# Patient Record
Sex: FEMALE | Race: WHITE | NOT HISPANIC OR LATINO | Employment: FULL TIME | URBAN - METROPOLITAN AREA
[De-identification: names, ages, dates, MRNs, and addresses within clinical notes are randomized per-mention and may not be internally consistent; named-entity substitution may affect disease eponyms.]

---

## 2017-04-15 ENCOUNTER — OFFICE VISIT (OUTPATIENT)
Dept: URGENT CARE | Facility: CLINIC | Age: 37
End: 2017-04-15
Payer: COMMERCIAL

## 2017-04-15 PROCEDURE — 99203 OFFICE O/P NEW LOW 30 MIN: CPT

## 2017-04-15 PROCEDURE — 87430 STREP A AG IA: CPT

## 2018-04-03 PROBLEM — J02.9 SORE THROAT: Status: ACTIVE | Noted: 2017-04-15

## 2018-04-03 PROBLEM — I10 HYPERTENSION: Status: ACTIVE | Noted: 2017-04-15

## 2018-04-03 PROBLEM — E07.9 THYROID DISEASE: Status: ACTIVE | Noted: 2017-04-15

## 2018-04-03 RX ORDER — LEVOTHYROXINE SODIUM 0.05 MG/1
50 TABLET ORAL DAILY
Refills: 5 | COMMUNITY
Start: 2018-02-14 | End: 2018-04-18 | Stop reason: SDUPTHER

## 2018-04-03 RX ORDER — CHOLECALCIFEROL (VITAMIN D3) 50 MCG
2000 TABLET ORAL DAILY
COMMUNITY
End: 2018-04-18 | Stop reason: SDUPTHER

## 2018-04-03 RX ORDER — METOPROLOL SUCCINATE 25 MG/1
25 TABLET, EXTENDED RELEASE ORAL 2 TIMES DAILY
Refills: 0 | COMMUNITY
Start: 2018-02-23 | End: 2018-04-04 | Stop reason: SDUPTHER

## 2018-04-03 RX ORDER — METOPROLOL SUCCINATE 200 MG/1
TABLET, EXTENDED RELEASE ORAL
COMMUNITY
End: 2018-04-04 | Stop reason: ALTCHOICE

## 2018-04-03 NOTE — PROGRESS NOTES
Assessment/Plan:    No problem-specific Assessment & Plan notes found for this encounter  Diagnoses and all orders for this visit:    Other fatigue  Comments:  labwork ordered  Orders:  -     UA w Reflex to Microscopic w Reflex to Culture    Encounter for preventative adult health care examination  Comments:  Labwork ordered      Unexplained weight gain  Comments:  Labwork ordered      Vitamin D deficiency  Comments:  Labwork ordered  Orders:  -     Vitamin D 25 hydroxy; Future    Thyroid disease  Comments:  Pt currently taking Levothyroxine  Labwork ordered  Orders:  -     TSH, 3rd generation with T4 reflex; Future    Essential hypertension  Comments:  Pt currently managed on Toprol XL 50 mg daily bp today 130/86    Orders:  -     Comprehensive metabolic panel    Screening for deficiency anemia  Comments:  labwork ordered  Orders:  -     CBC and differential; Future    Screening for hyperlipidemia  Comments:  labwork ordered  Orders:  -     Lipid panel; Future    Screening for diabetes mellitus  Comments:  labwork ordered  Orders:  -     Comprehensive metabolic panel    Other orders  -     levothyroxine 50 mcg tablet; Take 50 mcg by mouth daily  -     Discontinue: metoprolol succinate (TOPROL-XL) 25 mg 24 hr tablet; Take 25 mg by mouth 2 (two) times a day    -     Discontinue: metoprolol succinate (TOPROL-XL) 200 MG 24 hr tablet; Take by mouth  -     Multiple Vitamins-Minerals (MULTIVITAL PO); Take 1 capsule by mouth daily    -     Cholecalciferol (VITAMIN D) 2000 units tablet; Take 2,000 Units by mouth daily    -     metoprolol succinate (TOPROL-XL) 50 mg 24 hr tablet; Take 50 mg by mouth daily          Subjective:      Patient ID: Sara Conley is a 44 y o  female here to [facility], pt was former pt of Dr Lizzy Cardozo  Pt c/o inability to lose weight with weight gain of >50lbs  Pt has hx of total hysterectomy 5 years ago      HPI    The following portions of the patient's history were reviewed and updated as appropriate:   She  has a past medical history of Disease of thyroid gland and Hypertension  She   Patient Active Problem List    Diagnosis Date Noted    Essential hypertension 04/15/2017    Sore throat 04/15/2017    Thyroid disease 04/15/2017     She  has a past surgical history that includes  section and Hysterectomy  Her family history includes Asthma in her father; Hypertension in her father and mother; Muscular dystrophy in her mother; Raynaud syndrome in her sister  She  reports that she quit smoking about 15 years ago  Her smoking use included Cigarettes  She has never used smokeless tobacco  She reports that she drinks about 0 6 - 1 2 oz of alcohol per week   She reports that she does not use drugs  Current Outpatient Prescriptions   Medication Sig Dispense Refill    Cholecalciferol (VITAMIN D) 2000 units tablet Take 2,000 Units by mouth daily        levothyroxine 50 mcg tablet Take 50 mcg by mouth daily  5    metoprolol succinate (TOPROL-XL) 50 mg 24 hr tablet Take 50 mg by mouth daily      Multiple Vitamins-Minerals (MULTIVITAL PO) Take 1 capsule by mouth daily         No current facility-administered medications for this visit  No current outpatient prescriptions on file prior to visit  No current facility-administered medications on file prior to visit  She is allergic to nuts; amoxicillin; penicillins; and sulfamethoxazole-trimethoprim       Review of Systems   Constitutional: Negative  Negative for fatigue  HENT: Negative  Negative for congestion, postnasal drip, rhinorrhea, sinus pain, sore throat and trouble swallowing  Eyes: Negative  Negative for pain and visual disturbance  Respiratory: Negative  Negative for cough, shortness of breath and wheezing  Cardiovascular: Negative  Negative for chest pain and palpitations  Gastrointestinal: Negative  Negative for constipation, diarrhea, nausea and vomiting  Endocrine: Negative    Negative for polydipsia, polyphagia and polyuria  Genitourinary: Negative  Negative for difficulty urinating, flank pain, frequency and pelvic pain  Musculoskeletal: Negative  Negative for arthralgias, back pain, joint swelling and myalgias  Skin: Negative  Negative for color change and rash  Allergic/Immunologic: Negative  Neurological: Negative  Negative for dizziness, syncope, weakness, light-headedness, numbness and headaches  Hematological: Negative  Negative for adenopathy  Does not bruise/bleed easily  Psychiatric/Behavioral: Negative  Negative for behavioral problems and sleep disturbance  The patient is not nervous/anxious  Objective:      /86 (BP Location: Left arm, Patient Position: Sitting, Cuff Size: Standard)   Pulse 60   Temp 99 °F (37 2 °C) (Tympanic)   Resp 16   Ht 5' 4" (1 626 m)   Wt 80 3 kg (177 lb)   BMI 30 38 kg/m²          Physical Exam   Constitutional: She is oriented to person, place, and time  She appears well-developed and well-nourished  HENT:   Head: Normocephalic  Eyes: Pupils are equal, round, and reactive to light  Neck: Normal range of motion  Cardiovascular: Normal rate and regular rhythm  Pulmonary/Chest: Effort normal and breath sounds normal    Abdominal: Soft  Bowel sounds are normal    Musculoskeletal: Normal range of motion  Neurological: She is alert and oriented to person, place, and time  Skin: Skin is warm and dry  Psychiatric: She has a normal mood and affect  Her behavior is normal  Judgment and thought content normal    Nursing note and vitals reviewed

## 2018-04-04 ENCOUNTER — OFFICE VISIT (OUTPATIENT)
Dept: FAMILY MEDICINE CLINIC | Facility: CLINIC | Age: 38
End: 2018-04-04
Payer: COMMERCIAL

## 2018-04-04 VITALS
BODY MASS INDEX: 30.22 KG/M2 | TEMPERATURE: 99 F | HEIGHT: 64 IN | RESPIRATION RATE: 16 BRPM | SYSTOLIC BLOOD PRESSURE: 130 MMHG | DIASTOLIC BLOOD PRESSURE: 86 MMHG | WEIGHT: 177 LBS | HEART RATE: 60 BPM

## 2018-04-04 DIAGNOSIS — Z13.0 SCREENING FOR DEFICIENCY ANEMIA: ICD-10-CM

## 2018-04-04 DIAGNOSIS — E55.9 VITAMIN D DEFICIENCY: ICD-10-CM

## 2018-04-04 DIAGNOSIS — Z13.220 SCREENING FOR HYPERLIPIDEMIA: ICD-10-CM

## 2018-04-04 DIAGNOSIS — R53.83 OTHER FATIGUE: Primary | ICD-10-CM

## 2018-04-04 DIAGNOSIS — R63.5 UNEXPLAINED WEIGHT GAIN: ICD-10-CM

## 2018-04-04 DIAGNOSIS — Z13.1 SCREENING FOR DIABETES MELLITUS: ICD-10-CM

## 2018-04-04 DIAGNOSIS — E07.9 THYROID DISEASE: ICD-10-CM

## 2018-04-04 DIAGNOSIS — I10 ESSENTIAL HYPERTENSION: ICD-10-CM

## 2018-04-04 DIAGNOSIS — Z00.00 ENCOUNTER FOR PREVENTATIVE ADULT HEALTH CARE EXAMINATION: ICD-10-CM

## 2018-04-04 PROBLEM — N23 RENAL COLIC ON RIGHT SIDE: Status: ACTIVE | Noted: 2018-04-04

## 2018-04-04 PROBLEM — G43.109 OCULAR MIGRAINE: Status: ACTIVE | Noted: 2018-04-04

## 2018-04-04 PROCEDURE — 99204 OFFICE O/P NEW MOD 45 MIN: CPT | Performed by: NURSE PRACTITIONER

## 2018-04-04 RX ORDER — METOPROLOL SUCCINATE 50 MG/1
50 TABLET, EXTENDED RELEASE ORAL DAILY
COMMUNITY
End: 2018-04-18 | Stop reason: SDUPTHER

## 2018-04-04 NOTE — PATIENT INSTRUCTIONS
Hypothyroidism   AMBULATORY CARE:   Hypothyroidism  is a condition that develops when the thyroid gland does not make enough thyroid hormone  Thyroid hormones help control body temperature, heart rate, growth, and weight  Common signs and symptoms include the following: The signs and symptoms may develop slowly, sometimes over several years  · Exhaustion    · Sensitivity to cold    · Headaches or decreased concentration    · Muscle aches or weakness    · Constipation     · Dry, flaky skin or brittle nails    · Thinning hair    · Heavy or irregular monthly periods    · Depression or irritability  Call 911 for any of the following:   · You have sudden chest pain or shortness of breath  · You have a seizure  · You feel like you are going to faint  Seek care immediately if:   · You have diarrhea, tremors, or trouble sleeping  · Your legs, ankles, or feet are swollen  Contact your healthcare provider if:   · You have a fever  · You have chills, a cough, or feel weak and achy  · You have pain and swelling in your muscles and joints  · Your skin is itchy, swollen, or you have a rash  · Your signs and symptoms return or get worse, even after treatment  · You have questions or concerns about your condition or care  Treatment:  Thyroid hormone replacement medicine may bring your thyroid hormone level back to normal  Ask your healthcare provider for more information on other medicines you may need  Follow up with your healthcare provider as directed: You may need to return for more blood tests to check your thyroid hormone level  This will show if you are getting the right amount of thyroid medicine  Write down your questions so you remember to ask them during your visits  © 2017 [employer]  Information is for End User's use only and may not be sold, redistributed or otherwise used for commercial purposes   All illustrations and images included in CareNotes® are the copyrighted property of [employer]  or [employer]en Rodríguez  The above information is an  only  It is not intended as medical advice for individual conditions or treatments  Talk to your doctor, nurse or pharmacist before following any medical regimen to see if it is safe and effective for you

## 2018-04-09 ENCOUNTER — APPOINTMENT (OUTPATIENT)
Dept: LAB | Facility: CLINIC | Age: 38
End: 2018-04-09
Payer: COMMERCIAL

## 2018-04-09 ENCOUNTER — TRANSCRIBE ORDERS (OUTPATIENT)
Dept: LAB | Facility: CLINIC | Age: 38
End: 2018-04-09

## 2018-04-09 DIAGNOSIS — E55.9 VITAMIN D DEFICIENCY: ICD-10-CM

## 2018-04-09 DIAGNOSIS — E07.9 THYROID DISEASE: ICD-10-CM

## 2018-04-09 DIAGNOSIS — Z13.220 SCREENING FOR HYPERLIPIDEMIA: ICD-10-CM

## 2018-04-09 DIAGNOSIS — Z13.0 SCREENING FOR DEFICIENCY ANEMIA: ICD-10-CM

## 2018-04-09 LAB
25(OH)D3 SERPL-MCNC: 49.4 NG/ML (ref 30–100)
ALBUMIN SERPL BCP-MCNC: 3.8 G/DL (ref 3.5–5)
ALP SERPL-CCNC: 70 U/L (ref 46–116)
ALT SERPL W P-5'-P-CCNC: 25 U/L (ref 12–78)
ANION GAP SERPL CALCULATED.3IONS-SCNC: 5 MMOL/L (ref 4–13)
AST SERPL W P-5'-P-CCNC: 22 U/L (ref 5–45)
BACTERIA UR QL AUTO: ABNORMAL /HPF
BASOPHILS # BLD AUTO: 0.02 THOUSANDS/ΜL (ref 0–0.1)
BASOPHILS NFR BLD AUTO: 0 % (ref 0–1)
BILIRUB SERPL-MCNC: 0.24 MG/DL (ref 0.2–1)
BILIRUB UR QL STRIP: NEGATIVE
BUN SERPL-MCNC: 18 MG/DL (ref 5–25)
CALCIUM SERPL-MCNC: 9 MG/DL
CHLORIDE SERPL-SCNC: 109 MMOL/L (ref 100–108)
CHOLEST SERPL-MCNC: 185 MG/DL (ref 50–200)
CLARITY UR: CLEAR
CO2 SERPL-SCNC: 25 MMOL/L (ref 21–32)
COLOR UR: YELLOW
CREAT SERPL-MCNC: 0.7 MG/DL (ref 0.6–1.3)
EOSINOPHIL # BLD AUTO: 0.17 THOUSAND/ΜL (ref 0–0.61)
EOSINOPHIL NFR BLD AUTO: 3 % (ref 0–6)
ERYTHROCYTE [DISTWIDTH] IN BLOOD BY AUTOMATED COUNT: 13 % (ref 11.6–15.1)
GFR SERPL CREATININE-BSD FRML MDRD: 110 ML/MIN/1.73SQ M
GLUCOSE P FAST SERPL-MCNC: 83 MG/DL (ref 65–99)
GLUCOSE UR STRIP-MCNC: NEGATIVE MG/DL
HCT VFR BLD AUTO: 40.1 % (ref 34.8–46.1)
HDLC SERPL-MCNC: 43 MG/DL (ref 40–60)
HGB BLD-MCNC: 13.5 G/DL (ref 11.5–15.4)
HGB UR QL STRIP.AUTO: NEGATIVE
KETONES UR STRIP-MCNC: NEGATIVE MG/DL
LDLC SERPL CALC-MCNC: 114 MG/DL (ref 0–100)
LEUKOCYTE ESTERASE UR QL STRIP: ABNORMAL
LYMPHOCYTES # BLD AUTO: 2.17 THOUSANDS/ΜL (ref 0.6–4.47)
LYMPHOCYTES NFR BLD AUTO: 32 % (ref 14–44)
MCH RBC QN AUTO: 30.8 PG (ref 26.8–34.3)
MCHC RBC AUTO-ENTMCNC: 33.7 G/DL (ref 31.4–37.4)
MCV RBC AUTO: 92 FL (ref 82–98)
MONOCYTES # BLD AUTO: 0.69 THOUSAND/ΜL (ref 0.17–1.22)
MONOCYTES NFR BLD AUTO: 10 % (ref 4–12)
NEUTROPHILS # BLD AUTO: 3.66 THOUSANDS/ΜL (ref 1.85–7.62)
NEUTS SEG NFR BLD AUTO: 55 % (ref 43–75)
NITRITE UR QL STRIP: NEGATIVE
NON-SQ EPI CELLS URNS QL MICRO: ABNORMAL /HPF
NONHDLC SERPL-MCNC: 142 MG/DL
NRBC BLD AUTO-RTO: 0 /100 WBCS
PH UR STRIP.AUTO: 5.5 [PH] (ref 4.5–8)
PLATELET # BLD AUTO: 208 THOUSANDS/UL (ref 149–390)
PMV BLD AUTO: 12.7 FL (ref 8.9–12.7)
POTASSIUM SERPL-SCNC: 4.3 MMOL/L (ref 3.5–5.3)
PROT SERPL-MCNC: 7.8 G/DL (ref 6.4–8.2)
PROT UR STRIP-MCNC: NEGATIVE MG/DL
RBC # BLD AUTO: 4.38 MILLION/UL (ref 3.81–5.12)
RBC #/AREA URNS AUTO: ABNORMAL /HPF
SODIUM SERPL-SCNC: 139 MMOL/L (ref 136–145)
SP GR UR STRIP.AUTO: 1.02 (ref 1–1.03)
T4 FREE SERPL-MCNC: 0.97 NG/DL (ref 0.76–1.46)
TRIGL SERPL-MCNC: 139 MG/DL
TSH SERPL DL<=0.05 MIU/L-ACNC: 4.34 UIU/ML (ref 0.36–3.74)
UROBILINOGEN UR QL STRIP.AUTO: 0.2 E.U./DL
WBC # BLD AUTO: 6.73 THOUSAND/UL (ref 4.31–10.16)
WBC #/AREA URNS AUTO: ABNORMAL /HPF

## 2018-04-09 PROCEDURE — 84443 ASSAY THYROID STIM HORMONE: CPT

## 2018-04-09 PROCEDURE — 36415 COLL VENOUS BLD VENIPUNCTURE: CPT | Performed by: NURSE PRACTITIONER

## 2018-04-09 PROCEDURE — 80061 LIPID PANEL: CPT

## 2018-04-09 PROCEDURE — 81001 URINALYSIS AUTO W/SCOPE: CPT | Performed by: NURSE PRACTITIONER

## 2018-04-09 PROCEDURE — 85025 COMPLETE CBC W/AUTO DIFF WBC: CPT

## 2018-04-09 PROCEDURE — 82306 VITAMIN D 25 HYDROXY: CPT

## 2018-04-09 PROCEDURE — 80053 COMPREHEN METABOLIC PANEL: CPT | Performed by: NURSE PRACTITIONER

## 2018-04-09 PROCEDURE — 84439 ASSAY OF FREE THYROXINE: CPT

## 2018-04-18 ENCOUNTER — OFFICE VISIT (OUTPATIENT)
Dept: FAMILY MEDICINE CLINIC | Facility: CLINIC | Age: 38
End: 2018-04-18
Payer: COMMERCIAL

## 2018-04-18 VITALS
WEIGHT: 184 LBS | HEIGHT: 64 IN | DIASTOLIC BLOOD PRESSURE: 74 MMHG | BODY MASS INDEX: 31.41 KG/M2 | SYSTOLIC BLOOD PRESSURE: 110 MMHG | RESPIRATION RATE: 20 BRPM | HEART RATE: 76 BPM | TEMPERATURE: 98.3 F

## 2018-04-18 DIAGNOSIS — E07.9 THYROID DISEASE: Primary | ICD-10-CM

## 2018-04-18 DIAGNOSIS — E55.9 VITAMIN D DEFICIENCY: ICD-10-CM

## 2018-04-18 DIAGNOSIS — I10 ESSENTIAL HYPERTENSION: ICD-10-CM

## 2018-04-18 PROCEDURE — 99214 OFFICE O/P EST MOD 30 MIN: CPT | Performed by: NURSE PRACTITIONER

## 2018-04-18 PROCEDURE — 1036F TOBACCO NON-USER: CPT | Performed by: NURSE PRACTITIONER

## 2018-04-18 PROCEDURE — 3078F DIAST BP <80 MM HG: CPT | Performed by: NURSE PRACTITIONER

## 2018-04-18 PROCEDURE — 3074F SYST BP LT 130 MM HG: CPT | Performed by: NURSE PRACTITIONER

## 2018-04-18 PROCEDURE — 3008F BODY MASS INDEX DOCD: CPT | Performed by: NURSE PRACTITIONER

## 2018-04-18 RX ORDER — LEVOTHYROXINE SODIUM 0.1 MG/1
100 TABLET ORAL DAILY
Qty: 90 TABLET | Refills: 3 | Status: SHIPPED | OUTPATIENT
Start: 2018-04-18 | End: 2019-04-13 | Stop reason: SDUPTHER

## 2018-04-18 RX ORDER — METOPROLOL SUCCINATE 50 MG/1
50 TABLET, EXTENDED RELEASE ORAL DAILY
Qty: 90 TABLET | Refills: 3 | Status: SHIPPED | OUTPATIENT
Start: 2018-04-18 | End: 2019-04-13 | Stop reason: SDUPTHER

## 2018-04-18 RX ORDER — CHOLECALCIFEROL (VITAMIN D3) 50 MCG
2000 TABLET ORAL DAILY
Qty: 30 TABLET | Refills: 5 | Status: SHIPPED | OUTPATIENT
Start: 2018-04-18 | End: 2019-04-24 | Stop reason: ALTCHOICE

## 2018-04-18 NOTE — PATIENT INSTRUCTIONS
Hypothyroidism   AMBULATORY CARE:   Hypothyroidism  is a condition that develops when the thyroid gland does not make enough thyroid hormone  Thyroid hormones help control body temperature, heart rate, growth, and weight  Common signs and symptoms include the following: The signs and symptoms may develop slowly, sometimes over several years  · Exhaustion    · Sensitivity to cold    · Headaches or decreased concentration    · Muscle aches or weakness    · Constipation     · Dry, flaky skin or brittle nails    · Thinning hair    · Heavy or irregular monthly periods    · Depression or irritability  Call 911 for any of the following:   · You have sudden chest pain or shortness of breath  · You have a seizure  · You feel like you are going to faint  Seek care immediately if:   · You have diarrhea, tremors, or trouble sleeping  · Your legs, ankles, or feet are swollen  Contact your healthcare provider if:   · You have a fever  · You have chills, a cough, or feel weak and achy  · You have pain and swelling in your muscles and joints  · Your skin is itchy, swollen, or you have a rash  · Your signs and symptoms return or get worse, even after treatment  · You have questions or concerns about your condition or care  Treatment:  Thyroid hormone replacement medicine may bring your thyroid hormone level back to normal  Ask your healthcare provider for more information on other medicines you may need  Follow up with your healthcare provider as directed: You may need to return for more blood tests to check your thyroid hormone level  This will show if you are getting the right amount of thyroid medicine  Write down your questions so you remember to ask them during your visits  © 2017 [employer]  Information is for End User's use only and may not be sold, redistributed or otherwise used for commercial purposes   All illustrations and images included in CareNotes® are the copyrighted property of [employer]  or Ruben Rodríguez  The above information is an  only  It is not intended as medical advice for individual conditions or treatments  Talk to your doctor, nurse or pharmacist before following any medical regimen to see if it is safe and effective for you

## 2018-04-18 NOTE — PROGRESS NOTES
Assessment/Plan:    No problem-specific Assessment & Plan notes found for this encounter  Diagnoses and all orders for this visit:    Thyroid disease  Comments:  TSH elevated >4 0  levothyroxine increased to 100mcg  TSH repeat in 6 weeks  Orders:  -     levothyroxine 100 mcg tablet; Take 1 tablet (100 mcg total) by mouth daily  -     TSH, 3rd generation with T4 reflex; Future    Essential hypertension  Comments:  Bp 110/74  Metoprolol reordered  Orders:  -     metoprolol succinate (TOPROL-XL) 50 mg 24 hr tablet; Take 1 tablet (50 mg total) by mouth daily    Vitamin D deficiency  Comments:  Vitamin D 2000 IU reordered  Orders:  -     Cholecalciferol (VITAMIN D) 2000 units tablet; Take 1 tablet (2,000 Units total) by mouth daily          Subjective:      Patient ID: Liz Mchugh is a 44 y o  female here for 2 week f/u and lab review TSH elevated  Pt reports unplanned weight gain, feeling sluggish will increase the Levothyroxine  HPI    The following portions of the patient's history were reviewed and updated as appropriate:   She  has a past medical history of Disease of thyroid gland and Hypertension  She   Patient Active Problem List    Diagnosis Date Noted    Ocular migraine     Renal colic on right side     Essential hypertension 04/15/2017    Sore throat 04/15/2017    Thyroid disease 04/15/2017     She  has a past surgical history that includes  section and Hysterectomy  Her family history includes Asthma in her father; Hypertension in her father and mother; Muscular dystrophy in her mother; Raynaud syndrome in her sister  She  reports that she quit smoking about 15 years ago  Her smoking use included Cigarettes  She has never used smokeless tobacco  She reports that she drinks about 0 6 - 1 2 oz of alcohol per week   She reports that she does not use drugs    Current Outpatient Prescriptions   Medication Sig Dispense Refill    Cholecalciferol (VITAMIN D) 2000 units tablet Take 1 tablet (2,000 Units total) by mouth daily 30 tablet 5    levothyroxine 100 mcg tablet Take 1 tablet (100 mcg total) by mouth daily 90 tablet 3    metoprolol succinate (TOPROL-XL) 50 mg 24 hr tablet Take 1 tablet (50 mg total) by mouth daily 90 tablet 3    Multiple Vitamins-Minerals (MULTIVITAL PO) Take 1 capsule by mouth daily         No current facility-administered medications for this visit  Current Outpatient Prescriptions on File Prior to Visit   Medication Sig    Multiple Vitamins-Minerals (MULTIVITAL PO) Take 1 capsule by mouth daily      [DISCONTINUED] Cholecalciferol (VITAMIN D) 2000 units tablet Take 2,000 Units by mouth daily      [DISCONTINUED] levothyroxine 50 mcg tablet Take 50 mcg by mouth daily    [DISCONTINUED] metoprolol succinate (TOPROL-XL) 50 mg 24 hr tablet Take 50 mg by mouth daily     No current facility-administered medications on file prior to visit  She is allergic to nuts; amoxicillin; penicillins; and sulfamethoxazole-trimethoprim       Review of Systems   Constitutional: Positive for fatigue and unexpected weight change  HENT: Negative  Negative for congestion, postnasal drip, rhinorrhea, sinus pain, sore throat and trouble swallowing  Eyes: Negative  Negative for pain and visual disturbance  Respiratory: Negative  Negative for cough, shortness of breath and wheezing  Cardiovascular: Negative  Negative for chest pain and palpitations  Gastrointestinal: Negative  Negative for constipation, diarrhea, nausea and vomiting  Endocrine: Negative  Negative for polydipsia, polyphagia and polyuria  Genitourinary: Negative  Negative for difficulty urinating, flank pain, frequency and pelvic pain  Musculoskeletal: Negative  Negative for arthralgias, back pain, joint swelling and myalgias  Skin: Negative  Negative for color change and rash  Allergic/Immunologic: Negative  Neurological: Negative    Negative for dizziness, syncope, weakness, light-headedness, numbness and headaches  Hematological: Negative  Negative for adenopathy  Does not bruise/bleed easily  Psychiatric/Behavioral: Negative  Negative for behavioral problems and sleep disturbance  The patient is not nervous/anxious  Objective:      /74 (BP Location: Left arm, Patient Position: Sitting, Cuff Size: Large)   Pulse 76   Temp 98 3 °F (36 8 °C) (Tympanic)   Resp 20   Ht 5' 4" (1 626 m)   Wt 83 5 kg (184 lb)   BMI 31 58 kg/m²          Physical Exam   Constitutional: She is oriented to person, place, and time  She appears well-developed and well-nourished  HENT:   Head: Normocephalic  Eyes: Pupils are equal, round, and reactive to light  Neck: Normal range of motion  Cardiovascular: Normal rate and regular rhythm  Pulmonary/Chest: Effort normal and breath sounds normal    Abdominal: Soft  Bowel sounds are normal    Musculoskeletal: Normal range of motion  Neurological: She is alert and oriented to person, place, and time  Skin: Skin is warm and dry  Psychiatric: She has a normal mood and affect  Her behavior is normal  Judgment and thought content normal    Nursing note and vitals reviewed

## 2019-04-13 DIAGNOSIS — I10 ESSENTIAL HYPERTENSION: ICD-10-CM

## 2019-04-13 DIAGNOSIS — E07.9 THYROID DISEASE: ICD-10-CM

## 2019-04-13 RX ORDER — LEVOTHYROXINE SODIUM 0.1 MG/1
100 TABLET ORAL DAILY
Qty: 90 TABLET | Refills: 3 | Status: SHIPPED | OUTPATIENT
Start: 2019-04-13 | End: 2019-04-24 | Stop reason: SDUPTHER

## 2019-04-13 RX ORDER — METOPROLOL SUCCINATE 50 MG/1
50 TABLET, EXTENDED RELEASE ORAL DAILY
Qty: 90 TABLET | Refills: 3 | Status: SHIPPED | OUTPATIENT
Start: 2019-04-13 | End: 2019-04-24 | Stop reason: SDUPTHER

## 2019-04-24 ENCOUNTER — OFFICE VISIT (OUTPATIENT)
Dept: FAMILY MEDICINE CLINIC | Facility: CLINIC | Age: 39
End: 2019-04-24
Payer: COMMERCIAL

## 2019-04-24 ENCOUNTER — TRANSCRIBE ORDERS (OUTPATIENT)
Dept: LAB | Facility: CLINIC | Age: 39
End: 2019-04-24

## 2019-04-24 ENCOUNTER — APPOINTMENT (OUTPATIENT)
Dept: LAB | Facility: CLINIC | Age: 39
End: 2019-04-24
Payer: COMMERCIAL

## 2019-04-24 VITALS
DIASTOLIC BLOOD PRESSURE: 80 MMHG | RESPIRATION RATE: 16 BRPM | HEART RATE: 68 BPM | TEMPERATURE: 97.9 F | BODY MASS INDEX: 30.59 KG/M2 | HEIGHT: 64 IN | SYSTOLIC BLOOD PRESSURE: 108 MMHG | WEIGHT: 179.2 LBS

## 2019-04-24 DIAGNOSIS — Z13.1 SCREENING FOR DIABETES MELLITUS: ICD-10-CM

## 2019-04-24 DIAGNOSIS — Z13.220 SCREENING FOR HYPERLIPIDEMIA: ICD-10-CM

## 2019-04-24 DIAGNOSIS — Z00.00 ANNUAL PHYSICAL EXAM: Primary | ICD-10-CM

## 2019-04-24 DIAGNOSIS — E55.9 VITAMIN D DEFICIENCY: ICD-10-CM

## 2019-04-24 DIAGNOSIS — E07.9 THYROID DISEASE: ICD-10-CM

## 2019-04-24 DIAGNOSIS — Z13.0 SCREENING FOR DEFICIENCY ANEMIA: ICD-10-CM

## 2019-04-24 DIAGNOSIS — I10 ESSENTIAL HYPERTENSION: ICD-10-CM

## 2019-04-24 DIAGNOSIS — E03.9 HYPOTHYROIDISM, UNSPECIFIED TYPE: ICD-10-CM

## 2019-04-24 DIAGNOSIS — Z12.39 SCREENING FOR BREAST CANCER: ICD-10-CM

## 2019-04-24 DIAGNOSIS — R53.83 FATIGUE, UNSPECIFIED TYPE: ICD-10-CM

## 2019-04-24 LAB
25(OH)D3 SERPL-MCNC: 36 NG/ML (ref 30–100)
ALBUMIN SERPL BCP-MCNC: 4.2 G/DL (ref 3.5–5)
ALP SERPL-CCNC: 74 U/L (ref 46–116)
ALT SERPL W P-5'-P-CCNC: 30 U/L (ref 12–78)
ANION GAP SERPL CALCULATED.3IONS-SCNC: 4 MMOL/L (ref 4–13)
AST SERPL W P-5'-P-CCNC: 16 U/L (ref 5–45)
BACTERIA UR QL AUTO: ABNORMAL /HPF
BASOPHILS # BLD AUTO: 0.05 THOUSANDS/ΜL (ref 0–0.1)
BASOPHILS NFR BLD AUTO: 1 % (ref 0–1)
BILIRUB SERPL-MCNC: 0.37 MG/DL (ref 0.2–1)
BILIRUB UR QL STRIP: NEGATIVE
BUN SERPL-MCNC: 19 MG/DL (ref 5–25)
CALCIUM SERPL-MCNC: 8.9 MG/DL (ref 8.3–10.1)
CHLORIDE SERPL-SCNC: 106 MMOL/L (ref 100–108)
CHOLEST SERPL-MCNC: 190 MG/DL (ref 50–200)
CLARITY UR: CLEAR
CO2 SERPL-SCNC: 28 MMOL/L (ref 21–32)
COLOR UR: YELLOW
CREAT SERPL-MCNC: 0.7 MG/DL (ref 0.6–1.3)
EOSINOPHIL # BLD AUTO: 0.17 THOUSAND/ΜL (ref 0–0.61)
EOSINOPHIL NFR BLD AUTO: 3 % (ref 0–6)
ERYTHROCYTE [DISTWIDTH] IN BLOOD BY AUTOMATED COUNT: 13.1 % (ref 11.6–15.1)
GFR SERPL CREATININE-BSD FRML MDRD: 109 ML/MIN/1.73SQ M
GLUCOSE P FAST SERPL-MCNC: 87 MG/DL (ref 65–99)
GLUCOSE UR STRIP-MCNC: NEGATIVE MG/DL
HCT VFR BLD AUTO: 42.2 % (ref 34.8–46.1)
HDLC SERPL-MCNC: 50 MG/DL (ref 40–60)
HGB BLD-MCNC: 13.4 G/DL (ref 11.5–15.4)
HGB UR QL STRIP.AUTO: NEGATIVE
HYALINE CASTS #/AREA URNS LPF: ABNORMAL /LPF
IMM GRANULOCYTES # BLD AUTO: 0.01 THOUSAND/UL (ref 0–0.2)
IMM GRANULOCYTES NFR BLD AUTO: 0 % (ref 0–2)
KETONES UR STRIP-MCNC: NEGATIVE MG/DL
LDLC SERPL CALC-MCNC: 126 MG/DL (ref 0–100)
LEUKOCYTE ESTERASE UR QL STRIP: ABNORMAL
LYMPHOCYTES # BLD AUTO: 1.71 THOUSANDS/ΜL (ref 0.6–4.47)
LYMPHOCYTES NFR BLD AUTO: 30 % (ref 14–44)
MCH RBC QN AUTO: 30.3 PG (ref 26.8–34.3)
MCHC RBC AUTO-ENTMCNC: 31.8 G/DL (ref 31.4–37.4)
MCV RBC AUTO: 96 FL (ref 82–98)
MONOCYTES # BLD AUTO: 0.59 THOUSAND/ΜL (ref 0.17–1.22)
MONOCYTES NFR BLD AUTO: 10 % (ref 4–12)
NEUTROPHILS # BLD AUTO: 3.25 THOUSANDS/ΜL (ref 1.85–7.62)
NEUTS SEG NFR BLD AUTO: 56 % (ref 43–75)
NITRITE UR QL STRIP: NEGATIVE
NON-SQ EPI CELLS URNS QL MICRO: ABNORMAL /HPF
NONHDLC SERPL-MCNC: 140 MG/DL
NRBC BLD AUTO-RTO: 0 /100 WBCS
PH UR STRIP.AUTO: 5.5 [PH]
PLATELET # BLD AUTO: 206 THOUSANDS/UL (ref 149–390)
PMV BLD AUTO: 12.2 FL (ref 8.9–12.7)
POTASSIUM SERPL-SCNC: 4.4 MMOL/L (ref 3.5–5.3)
PROT SERPL-MCNC: 7.9 G/DL (ref 6.4–8.2)
PROT UR STRIP-MCNC: NEGATIVE MG/DL
RBC # BLD AUTO: 4.42 MILLION/UL (ref 3.81–5.12)
RBC #/AREA URNS AUTO: ABNORMAL /HPF
SODIUM SERPL-SCNC: 138 MMOL/L (ref 136–145)
SP GR UR STRIP.AUTO: 1.02 (ref 1–1.03)
TRIGL SERPL-MCNC: 69 MG/DL
TSH SERPL DL<=0.05 MIU/L-ACNC: 3.4 UIU/ML (ref 0.36–3.74)
UROBILINOGEN UR QL STRIP.AUTO: 0.2 E.U./DL
WBC # BLD AUTO: 5.78 THOUSAND/UL (ref 4.31–10.16)
WBC #/AREA URNS AUTO: ABNORMAL /HPF

## 2019-04-24 PROCEDURE — 3074F SYST BP LT 130 MM HG: CPT | Performed by: NURSE PRACTITIONER

## 2019-04-24 PROCEDURE — 80053 COMPREHEN METABOLIC PANEL: CPT

## 2019-04-24 PROCEDURE — 85025 COMPLETE CBC W/AUTO DIFF WBC: CPT

## 2019-04-24 PROCEDURE — 80061 LIPID PANEL: CPT

## 2019-04-24 PROCEDURE — 82306 VITAMIN D 25 HYDROXY: CPT

## 2019-04-24 PROCEDURE — 3008F BODY MASS INDEX DOCD: CPT | Performed by: NURSE PRACTITIONER

## 2019-04-24 PROCEDURE — 36415 COLL VENOUS BLD VENIPUNCTURE: CPT

## 2019-04-24 PROCEDURE — 81001 URINALYSIS AUTO W/SCOPE: CPT | Performed by: NURSE PRACTITIONER

## 2019-04-24 PROCEDURE — 1036F TOBACCO NON-USER: CPT | Performed by: NURSE PRACTITIONER

## 2019-04-24 PROCEDURE — 99214 OFFICE O/P EST MOD 30 MIN: CPT | Performed by: NURSE PRACTITIONER

## 2019-04-24 PROCEDURE — 84443 ASSAY THYROID STIM HORMONE: CPT

## 2019-04-24 PROCEDURE — 3079F DIAST BP 80-89 MM HG: CPT | Performed by: NURSE PRACTITIONER

## 2019-04-24 RX ORDER — METOPROLOL SUCCINATE 50 MG/1
50 TABLET, EXTENDED RELEASE ORAL DAILY
Qty: 90 TABLET | Refills: 3 | Status: SHIPPED | OUTPATIENT
Start: 2019-04-24 | End: 2020-06-24

## 2019-04-24 RX ORDER — LEVOTHYROXINE SODIUM 0.1 MG/1
100 TABLET ORAL DAILY
Qty: 90 TABLET | Refills: 3 | Status: SHIPPED | OUTPATIENT
Start: 2019-04-24 | End: 2020-06-30

## 2020-06-19 DIAGNOSIS — I10 ESSENTIAL HYPERTENSION: ICD-10-CM

## 2020-06-24 RX ORDER — METOPROLOL SUCCINATE 50 MG/1
TABLET, EXTENDED RELEASE ORAL
Qty: 90 TABLET | Refills: 3 | Status: SHIPPED | OUTPATIENT
Start: 2020-06-24 | End: 2021-06-03 | Stop reason: SDUPTHER

## 2020-06-25 ENCOUNTER — TELEPHONE (OUTPATIENT)
Dept: INTERNAL MEDICINE CLINIC | Facility: CLINIC | Age: 40
End: 2020-06-25

## 2020-06-30 DIAGNOSIS — E07.9 THYROID DISEASE: ICD-10-CM

## 2020-06-30 RX ORDER — LEVOTHYROXINE SODIUM 0.1 MG/1
100 TABLET ORAL DAILY
Qty: 90 TABLET | Refills: 3 | Status: SHIPPED | OUTPATIENT
Start: 2020-06-30 | End: 2021-06-03 | Stop reason: SDUPTHER

## 2020-07-08 ENCOUNTER — OFFICE VISIT (OUTPATIENT)
Dept: INTERNAL MEDICINE CLINIC | Facility: CLINIC | Age: 40
End: 2020-07-08
Payer: COMMERCIAL

## 2020-07-08 VITALS
DIASTOLIC BLOOD PRESSURE: 70 MMHG | WEIGHT: 189 LBS | HEART RATE: 64 BPM | BODY MASS INDEX: 33.49 KG/M2 | HEIGHT: 63 IN | SYSTOLIC BLOOD PRESSURE: 116 MMHG | TEMPERATURE: 97.5 F | OXYGEN SATURATION: 97 %

## 2020-07-08 DIAGNOSIS — Z11.4 SCREENING FOR HIV (HUMAN IMMUNODEFICIENCY VIRUS): ICD-10-CM

## 2020-07-08 DIAGNOSIS — Z12.39 ENCOUNTER FOR SCREENING FOR MALIGNANT NEOPLASM OF BREAST: ICD-10-CM

## 2020-07-08 DIAGNOSIS — Z13.31 DEPRESSION SCREENING NEGATIVE: ICD-10-CM

## 2020-07-08 DIAGNOSIS — R53.83 OTHER FATIGUE: ICD-10-CM

## 2020-07-08 DIAGNOSIS — Z13.1 SCREENING FOR DIABETES MELLITUS (DM): ICD-10-CM

## 2020-07-08 DIAGNOSIS — Z12.39 SCREENING FOR BREAST CANCER: ICD-10-CM

## 2020-07-08 DIAGNOSIS — Z13.220 SCREENING FOR HYPERLIPIDEMIA: ICD-10-CM

## 2020-07-08 DIAGNOSIS — I10 ESSENTIAL HYPERTENSION: ICD-10-CM

## 2020-07-08 DIAGNOSIS — E55.9 VITAMIN D DEFICIENCY: ICD-10-CM

## 2020-07-08 DIAGNOSIS — Z00.00 ANNUAL PHYSICAL EXAM: Primary | ICD-10-CM

## 2020-07-08 DIAGNOSIS — Z12.4 SCREENING FOR CERVICAL CANCER: ICD-10-CM

## 2020-07-08 DIAGNOSIS — E07.9 THYROID DISEASE: ICD-10-CM

## 2020-07-08 PROCEDURE — 99396 PREV VISIT EST AGE 40-64: CPT | Performed by: NURSE PRACTITIONER

## 2020-07-08 NOTE — PROGRESS NOTES
Assessment/Plan:    No problem-specific Assessment & Plan notes found for this encounter  Diagnoses and all orders for this visit:    Annual physical exam  Comments:  completed today    Essential hypertension  Comments:  bp 116/70 pt on Metoprolol  Orders:  -     CBC and differential; Future    Thyroid disease  Comments:  labwork ordered, Levothyroxine ordered  Orders:  -     TSH, 3rd generation with Free T4 reflex; Future    Vitamin D deficiency  Comments:  labwork ordered  Orders:  -     Vitamin D 25 hydroxy; Future    Other fatigue  Comments:  labwork ordered  Orders:  -     CBC and differential; Future    Screening for breast cancer  Comments:  maqmmogram ordered  Orders:  -     Mammo screening bilateral w cad; Future    Screening for cervical cancer  Comments:  pap exam here    Screening for hyperlipidemia  Comments:  labwork ordered  Orders:  -     Lipid panel; Future    Depression screening negative    Screening for HIV (human immunodeficiency virus)  Comments:  labwork ordered  Orders:  -     HIV 1/2 Antigen/Antibody (4th Generation) w Reflex [facility]; Future    Screening for diabetes mellitus (DM)  Comments:  labwork ordered  Orders:  -     Comprehensive metabolic panel    Encounter for screening for malignant neoplasm of breast  Comments:  mammogram ordered  Orders:  -     Mammo screening bilateral w 3d & cad; Future    BMI 33 0-33 9,adult          Subjective:      Patient ID: Jeronimo Collins is a 35 y o  female  Annual physical     35 yr old female presents for annual physical   Pt denies any complaints today, will have pap exam here  Pt       The following portions of the patient's history were reviewed and updated as appropriate: She  has a past medical history of Disease of thyroid gland and Hypertension    She   Patient Active Problem List    Diagnosis Date Noted    Depression screening negative 07/08/2020    BMI 33 0-33 9,adult 07/08/2020    Ocular migraine 25/62/7366    Renal colic on right side 2018    Essential hypertension 04/15/2017    Sore throat 04/15/2017    Thyroid disease 04/15/2017     She  has a past surgical history that includes  section and Hysterectomy  Her family history includes Asthma in her father; Hypertension in her father and mother; Muscular dystrophy in her mother; Raynaud syndrome in her sister  She  reports that she quit smoking about 17 years ago  Her smoking use included cigarettes  She has never used smokeless tobacco  She reports that she drinks about 1 0 - 2 0 standard drinks of alcohol per week  She reports that she does not use drugs  Current Outpatient Medications   Medication Sig Dispense Refill    levothyroxine 100 mcg tablet TAKE 1 TABLET (100 MCG TOTAL) BY MOUTH DAILY 90 tablet 3    metoprolol succinate (TOPROL-XL) 50 mg 24 hr tablet TAKE 1 TABLET BY MOUTH EVERY DAY 90 tablet 3    Multiple Vitamins-Minerals (MULTIVITAL PO) Take 1 capsule by mouth daily         No current facility-administered medications for this visit  Current Outpatient Medications on File Prior to Visit   Medication Sig    levothyroxine 100 mcg tablet TAKE 1 TABLET (100 MCG TOTAL) BY MOUTH DAILY    metoprolol succinate (TOPROL-XL) 50 mg 24 hr tablet TAKE 1 TABLET BY MOUTH EVERY DAY    Multiple Vitamins-Minerals (MULTIVITAL PO) Take 1 capsule by mouth daily       No current facility-administered medications on file prior to visit  She is allergic to nuts; amoxicillin; penicillins; and sulfamethoxazole-trimethoprim       Review of Systems   Constitutional: Negative  HENT: Negative  Eyes: Negative  Respiratory: Negative  Cardiovascular: Negative  Gastrointestinal: Negative  Endocrine: Negative  Genitourinary: Negative  Musculoskeletal: Negative  Skin: Negative  Allergic/Immunologic: Negative  Neurological: Negative  Hematological: Negative  Psychiatric/Behavioral: Negative  BMI Counseling:  Body mass index is 33 48 kg/m²  Discussed the patient's BMI with her  The BMI is above normal  Nutrition recommendations include reducing portion sizes, decreasing overall calorie intake, 3-5 servings of fruits/vegetables daily, reducing fast food intake, consuming healthier snacks, decreasing soda and/or juice intake, moderation in carbohydrate intake, increasing intake of lean protein, reducing intake of saturated fat and trans fat and reducing intake of cholesterol  Exercise recommendations include exercising 3-5 times per week  PHQ-9 Depression Screening    PHQ-9:    Frequency of the following problems over the past two weeks:       Little interest or pleasure in doing things:  0 - not at all  Feeling down, depressed, or hopeless:  0 - not at all  PHQ-2 Score:  0            Depression Screening Follow-up Plan: Patient's depression screening was negative with a PHQ-2 score of 0    Clinically patient does not have depression  No treatment is required  Objective:      /70   Pulse 64   Temp 97 5 °F (36 4 °C)   Ht 5' 3" (1 6 m)   Wt 85 7 kg (189 lb)   SpO2 97%   BMI 33 48 kg/m²          Physical Exam   Constitutional: She is oriented to person, place, and time  She appears well-developed and well-nourished  HENT:   Head: Normocephalic  Eyes: Pupils are equal, round, and reactive to light  Neck: Normal range of motion  Cardiovascular: Normal rate and regular rhythm  Pulmonary/Chest: Effort normal and breath sounds normal    Abdominal: Soft  Bowel sounds are normal    Musculoskeletal: Normal range of motion  Neurological: She is alert and oriented to person, place, and time  Skin: Skin is warm and dry  Psychiatric: She has a normal mood and affect  Her behavior is normal  Judgment and thought content normal    Nursing note and vitals reviewed

## 2020-08-12 ENCOUNTER — OFFICE VISIT (OUTPATIENT)
Dept: INTERNAL MEDICINE CLINIC | Facility: CLINIC | Age: 40
End: 2020-08-12
Payer: COMMERCIAL

## 2020-08-12 VITALS
TEMPERATURE: 98 F | BODY MASS INDEX: 33.71 KG/M2 | HEART RATE: 71 BPM | OXYGEN SATURATION: 98 % | HEIGHT: 63 IN | SYSTOLIC BLOOD PRESSURE: 118 MMHG | DIASTOLIC BLOOD PRESSURE: 70 MMHG | WEIGHT: 190.25 LBS

## 2020-08-12 DIAGNOSIS — Z12.4 PAP SMEAR FOR CERVICAL CANCER SCREENING: Primary | ICD-10-CM

## 2020-08-12 PROCEDURE — 3008F BODY MASS INDEX DOCD: CPT | Performed by: NURSE PRACTITIONER

## 2020-08-12 PROCEDURE — S0612 ANNUAL GYNECOLOGICAL EXAMINA: HCPCS | Performed by: NURSE PRACTITIONER

## 2020-08-12 PROCEDURE — G0145 SCR C/V CYTO,THINLAYER,RESCR: HCPCS | Performed by: NURSE PRACTITIONER

## 2020-08-12 PROCEDURE — 3078F DIAST BP <80 MM HG: CPT | Performed by: NURSE PRACTITIONER

## 2020-08-12 PROCEDURE — 3074F SYST BP LT 130 MM HG: CPT | Performed by: NURSE PRACTITIONER

## 2020-08-12 PROCEDURE — 1036F TOBACCO NON-USER: CPT | Performed by: NURSE PRACTITIONER

## 2020-08-12 NOTE — PROGRESS NOTES
Subjective       Diagnoses and all orders for this visit:    Pap smear for cervical cancer screening  Comments:  Completed today  Orders:  -     Liquid-based pap, screening      Mary Ochoa is a 38 y o  woman who comes in today for a  pap smear only  Her most recent annual exam was on   Her most recent Pap smear was on several years ago and showed no abnormalities  Previous abnormal Pap smears: pt has partial hysterectomy  Contraception: none    The following portions of the patient's history were reviewed and updated as appropriate:   She  has a past medical history of Disease of thyroid gland and Hypertension  She   Patient Active Problem List    Diagnosis Date Noted    Depression screening negative 2020    BMI 33 0-33 9,adult 2020    Ocular migraine     Renal colic on right side     Essential hypertension 04/15/2017    Sore throat 04/15/2017    Thyroid disease 04/15/2017     She  has a past surgical history that includes  section and Hysterectomy  Her family history includes Asthma in her father; Hypertension in her father and mother; Muscular dystrophy in her mother; Raynaud syndrome in her sister  She  reports that she quit smoking about 17 years ago  Her smoking use included cigarettes  She has never used smokeless tobacco  She reports current alcohol use of about 1 0 - 2 0 standard drinks of alcohol per week  She reports that she does not use drugs  Current Outpatient Medications   Medication Sig Dispense Refill    levothyroxine 100 mcg tablet TAKE 1 TABLET (100 MCG TOTAL) BY MOUTH DAILY 90 tablet 3    metoprolol succinate (TOPROL-XL) 50 mg 24 hr tablet TAKE 1 TABLET BY MOUTH EVERY DAY 90 tablet 3    Multiple Vitamins-Minerals (MULTIVITAL PO) Take 1 capsule by mouth daily         No current facility-administered medications for this visit        Current Outpatient Medications on File Prior to Visit   Medication Sig    levothyroxine 100 mcg tablet TAKE 1 TABLET (100 MCG TOTAL) BY MOUTH DAILY    metoprolol succinate (TOPROL-XL) 50 mg 24 hr tablet TAKE 1 TABLET BY MOUTH EVERY DAY    Multiple Vitamins-Minerals (MULTIVITAL PO) Take 1 capsule by mouth daily       No current facility-administered medications on file prior to visit  She is allergic to nuts; amoxicillin; penicillins; and sulfamethoxazole-trimethoprim       Review of Systems  Genitourinary:negative  Objective     /70   Pulse 71   Temp 98 °F (36 7 °C)   Ht 5' 3" (1 6 m)   Wt 86 3 kg (190 lb 4 oz)   SpO2 98%   BMI 33 70 kg/m²   Pelvic Exam: external genitalia normal, vagina normal without discharge and cervix normal in appearance  Pap smear obtained  Assessment/Plan     Screening pap smear  Follow up in 5 years, or as indicated by Pap results   Pt with partial hysterectomy pending results

## 2020-08-18 LAB
LAB AP GYN PRIMARY INTERPRETATION: NORMAL
Lab: NORMAL

## 2020-10-02 ENCOUNTER — APPOINTMENT (OUTPATIENT)
Dept: LAB | Facility: CLINIC | Age: 40
End: 2020-10-02
Payer: COMMERCIAL

## 2020-10-02 ENCOUNTER — TRANSCRIBE ORDERS (OUTPATIENT)
Dept: LAB | Facility: CLINIC | Age: 40
End: 2020-10-02

## 2020-10-02 DIAGNOSIS — E07.9 THYROID DISEASE: ICD-10-CM

## 2020-10-02 DIAGNOSIS — I10 ESSENTIAL HYPERTENSION: ICD-10-CM

## 2020-10-02 DIAGNOSIS — Z11.4 SCREENING FOR HIV (HUMAN IMMUNODEFICIENCY VIRUS): ICD-10-CM

## 2020-10-02 DIAGNOSIS — R53.83 OTHER FATIGUE: ICD-10-CM

## 2020-10-02 DIAGNOSIS — E55.9 VITAMIN D DEFICIENCY: ICD-10-CM

## 2020-10-02 DIAGNOSIS — Z13.220 SCREENING FOR HYPERLIPIDEMIA: ICD-10-CM

## 2020-10-02 LAB
25(OH)D3 SERPL-MCNC: 36.4 NG/ML (ref 30–100)
ALBUMIN SERPL BCP-MCNC: 3.9 G/DL (ref 3.5–5)
ALP SERPL-CCNC: 80 U/L (ref 46–116)
ALT SERPL W P-5'-P-CCNC: 37 U/L (ref 12–78)
ANION GAP SERPL CALCULATED.3IONS-SCNC: 4 MMOL/L (ref 4–13)
AST SERPL W P-5'-P-CCNC: 17 U/L (ref 5–45)
BASOPHILS # BLD AUTO: 0.04 THOUSANDS/ΜL (ref 0–0.1)
BASOPHILS NFR BLD AUTO: 1 % (ref 0–1)
BILIRUB SERPL-MCNC: 0.33 MG/DL (ref 0.2–1)
BUN SERPL-MCNC: 14 MG/DL (ref 5–25)
CALCIUM SERPL-MCNC: 9.3 MG/DL (ref 8.3–10.1)
CHLORIDE SERPL-SCNC: 106 MMOL/L (ref 100–108)
CHOLEST SERPL-MCNC: 190 MG/DL (ref 50–200)
CO2 SERPL-SCNC: 29 MMOL/L (ref 21–32)
CREAT SERPL-MCNC: 0.77 MG/DL (ref 0.6–1.3)
EOSINOPHIL # BLD AUTO: 0.22 THOUSAND/ΜL (ref 0–0.61)
EOSINOPHIL NFR BLD AUTO: 3 % (ref 0–6)
ERYTHROCYTE [DISTWIDTH] IN BLOOD BY AUTOMATED COUNT: 12.7 % (ref 11.6–15.1)
GFR SERPL CREATININE-BSD FRML MDRD: 96 ML/MIN/1.73SQ M
GLUCOSE P FAST SERPL-MCNC: 81 MG/DL (ref 65–99)
HCT VFR BLD AUTO: 40.9 % (ref 34.8–46.1)
HDLC SERPL-MCNC: 48 MG/DL
HGB BLD-MCNC: 13.4 G/DL (ref 11.5–15.4)
IMM GRANULOCYTES # BLD AUTO: 0.02 THOUSAND/UL (ref 0–0.2)
IMM GRANULOCYTES NFR BLD AUTO: 0 % (ref 0–2)
LDLC SERPL CALC-MCNC: 125 MG/DL (ref 0–100)
LYMPHOCYTES # BLD AUTO: 2.15 THOUSANDS/ΜL (ref 0.6–4.47)
LYMPHOCYTES NFR BLD AUTO: 30 % (ref 14–44)
MCH RBC QN AUTO: 31.1 PG (ref 26.8–34.3)
MCHC RBC AUTO-ENTMCNC: 32.8 G/DL (ref 31.4–37.4)
MCV RBC AUTO: 95 FL (ref 82–98)
MONOCYTES # BLD AUTO: 0.64 THOUSAND/ΜL (ref 0.17–1.22)
MONOCYTES NFR BLD AUTO: 9 % (ref 4–12)
NEUTROPHILS # BLD AUTO: 4.03 THOUSANDS/ΜL (ref 1.85–7.62)
NEUTS SEG NFR BLD AUTO: 57 % (ref 43–75)
NONHDLC SERPL-MCNC: 142 MG/DL
NRBC BLD AUTO-RTO: 0 /100 WBCS
PLATELET # BLD AUTO: 212 THOUSANDS/UL (ref 149–390)
PMV BLD AUTO: 12.1 FL (ref 8.9–12.7)
POTASSIUM SERPL-SCNC: 4.5 MMOL/L (ref 3.5–5.3)
PROT SERPL-MCNC: 7.6 G/DL (ref 6.4–8.2)
RBC # BLD AUTO: 4.31 MILLION/UL (ref 3.81–5.12)
SODIUM SERPL-SCNC: 139 MMOL/L (ref 136–145)
TRIGL SERPL-MCNC: 87 MG/DL
TSH SERPL DL<=0.05 MIU/L-ACNC: 2.74 UIU/ML (ref 0.36–3.74)
WBC # BLD AUTO: 7.1 THOUSAND/UL (ref 4.31–10.16)

## 2020-10-02 PROCEDURE — 85025 COMPLETE CBC W/AUTO DIFF WBC: CPT

## 2020-10-02 PROCEDURE — 36415 COLL VENOUS BLD VENIPUNCTURE: CPT

## 2020-10-02 PROCEDURE — 84443 ASSAY THYROID STIM HORMONE: CPT

## 2020-10-02 PROCEDURE — 80053 COMPREHEN METABOLIC PANEL: CPT | Performed by: NURSE PRACTITIONER

## 2020-10-02 PROCEDURE — 87389 HIV-1 AG W/HIV-1&-2 AB AG IA: CPT

## 2020-10-02 PROCEDURE — 80061 LIPID PANEL: CPT

## 2020-10-02 PROCEDURE — 82306 VITAMIN D 25 HYDROXY: CPT

## 2020-10-05 LAB — HIV 1+2 AB+HIV1 P24 AG SERPL QL IA: NORMAL

## 2021-01-01 ENCOUNTER — AMB VIDEO VISIT (OUTPATIENT)
Dept: URGENT CARE | Age: 41
End: 2021-01-01

## 2021-01-01 DIAGNOSIS — B34.9 VIRAL INFECTION: Primary | ICD-10-CM

## 2021-01-01 NOTE — CARE ANYWHERE EVISITS
Visit Summary for Chintan MILLER - Gender: Female - Date of Birth: [id number]  Date: [id number] - Duration: 3 minutes  Patient: Chintan MILLER  Provider: Tip Xie PA-C    Patient Contact Information  Address  [address] [place] [id number]      Visit Topics  Head cold symptoms  Loss of taste and smell   positive for COVID  [Added By: Self - 2021-01-01]    Triage Questions   What is your current physical address in the event of a medical emergency? Answer []  Are you allergic to any medications? Answer []  Are you now or could you be pregnant? Answer []  Do you have any immune system compromise or chronic lung   disease? Answer []  Do you have any vulnerable family members in the home (infant, pregnant, cancer, elderly)? Answer []     Conversation Transcripts  [0A][0A] [Notification] You are connected with Tip Xie PA-C, Urgent Care Specialist [0A][Notification] Christianne Ponce is located in [place]  [0A][Notification] Christianne Ponce has shared health history  Taylor Paz  [0A]    Diagnosis    Procedures  Value: [id number] Code: CPT-4 UNLISTED E&M SERVICE    Medications Prescribed    No prescriptions ordered    Electronically signed by: Slim Azevedo PA-C(NPI [id number])

## 2021-01-01 NOTE — PROGRESS NOTES
Telemedicine consent    Patient: Valentine Lesches  Provider: Sesar Azevedo PA-C  Provider located at [address] [place] [address] [place] [id number]    After connecting through [facility], the patient was identified by name and date of birth  Valentine Lesches was informed that this is a telemedicine visit which may not be secure and therefore, might not be HIPAA-compliant  My office door was closed  No one else was in the room  She acknowledged consent and understanding of privacy and security of the platform  The patient has agreed to participate and understands they can discontinue the visit at any time  Patient is aware this is a billable service  I spent 4 minutes with the patient during this visit  [facility]        NAME: Valentine Lesches is a 38 y o  female  : 1980    MRN: [id number]  DATE: 2021  TIME: 12:37 PM    Assessment and Plan   Viral infection [B34 9]  1  Viral infection  Novel Coronavirus (COVID-19), PCR [facility] - Collected at [facility]         Patient Instructions     Motrin and/or Tylenol as needed for fevers aches and pains  Follow up with PCP in 3-5 days  Proceed to  ER if symptoms worsen  Chief Complaint   No chief complaint on file  History of Present Illness       69-year-old female presents for telemedicine visit  Patient reports that her  recently tested positive today for COVID  For the past couple days has been having some fevers and mid body aches and pains mild cough and loss of taste of smell today  Denies any vomiting or abdominal pain  Has had some loose stools  No chest pain shortness of breath  Other  This is a new problem  The current episode started in the past 7 days  The problem has been waxing and waning  Associated symptoms include arthralgias, congestion, coughing, fatigue, a fever and myalgias   Pertinent negatives include no abdominal pain, anorexia, chest pain, chills, visual change or vomiting  Nothing aggravates the symptoms  She has tried nothing for the symptoms  The treatment provided no relief  Review of Systems   Review of Systems   Constitutional: Positive for fatigue and fever  Negative for chills  HENT: Positive for congestion  Eyes: Negative  Respiratory: Positive for cough  Cardiovascular: Negative  Negative for chest pain  Gastrointestinal: Negative  Negative for abdominal pain, anorexia and vomiting  Musculoskeletal: Positive for arthralgias and myalgias  Skin: Negative  Neurological: Negative  Current Medications       Current Outpatient Medications:     levothyroxine 100 mcg tablet, TAKE 1 TABLET (100 MCG TOTAL) BY MOUTH DAILY, Disp: 90 tablet, Rfl: 3    metoprolol succinate (TOPROL-XL) 50 mg 24 hr tablet, TAKE 1 TABLET BY MOUTH EVERY DAY, Disp: 90 tablet, Rfl: 3    Multiple Vitamins-Minerals (MULTIVITAL PO), Take 1 capsule by mouth daily  , Disp: , Rfl:     Current Allergies     Allergies as of 2021 - Reviewed 2021   Allergen Reaction Noted    Nuts Anaphylaxis     Amoxicillin Hives     Penicillins Hives     Sulfamethoxazole-trimethoprim Hives             The following portions of the patient's history were reviewed and updated as appropriate: allergies, current medications, past family history, past medical history, past social history, past surgical history and problem list      Past Medical History:   Diagnosis Date    Disease of thyroid gland     Hypertension        Past Surgical History:   Procedure Laterality Date     SECTION      Twice    HYSTERECTOMY      Total        Family History   Problem Relation Age of Onset    Muscular dystrophy Mother     Hypertension Mother     Hypertension Father     Asthma Father     Raynaud syndrome Sister          Medications have been verified  Objective   There were no vitals taken for this visit  No LMP recorded   Patient has had a hysterectomy  Physical Exam     Physical Exam  Constitutional:       Appearance: She is well-developed  HENT:      Head: Normocephalic and atraumatic  Right Ear: External ear normal       Left Ear: External ear normal       Nose: Nose normal    Eyes:      General:         Right eye: No discharge  Left eye: No discharge  Conjunctiva/sclera: Conjunctivae normal    Neck:      Musculoskeletal: Normal range of motion  Pulmonary:      Effort: Pulmonary effort is normal    Musculoskeletal: Normal range of motion  Neurological:      Mental Status: She is alert  Psychiatric:         Behavior: Behavior normal          Thought Content:  Thought content normal          Judgment: Judgment normal

## 2021-01-01 NOTE — PATIENT INSTRUCTIONS
COVID-19 (Coronavirus Disease 2019)   AMBULATORY CARE:   Coronavirus disease 2019 (COVID-19)  is the disease caused by the novel (new) coronavirus first discovered in December 2019  Coronaviruses generally cause upper respiratory (nose, throat, and lung) infections, such as a cold  The new virus can also cause serious lower respiratory conditions, such as pneumonia or acute respiratory distress syndrome (ARDS)  Anyone can develop serious problems from the new virus, but your risk is higher if you are 65 or older  A weak immune system, diabetes, or a heart or lung condition can also increase your risk  Signs and symptoms: You may not develop any signs or symptoms  Signs and symptoms that do develop usually start about 5 days after infection but can take 2 to 14 days  Signs and symptoms range from mild to severe  You may feel like you have the flu or a bad cold  Information on COVID-19 is still being learned  Tell your healthcare provider if you think you were infected but develop signs or symptoms not listed below:  · A cough    · Shortness of breath or trouble breathing that may become severe    · A fever of at least 100 4°F, or 38°C (may be lower in adults 65 or older)    · Chills that might include shaking    · Muscle pain, body aches, or a headache    · A sore throat    · Suddenly not being able to taste or smell anything    · Feeling mentally and physically tired (fatigue)    · Congestion (stuffy head and nose), or a runny nose    · Diarrhea, nausea, or vomiting    If you think you or someone you know may be infected:  Do the following to protect others:  · If emergency care is needed,  tell the  about the possible infection, or call ahead and tell the emergency department  · Call a healthcare provider  for instructions if symptoms are mild  Anyone who may be infected should not  arrive without calling first  The provider will need to protect staff members and other patients      · The person who may be infected needs to wear a face covering  while getting medical care  This will help lower the risk of infecting others  Coverings are not used for anyone who is younger than 2 years, has breathing problems, or cannot remove it  The provider can give you instructions for anyone who cannot wear a covering  Call your local emergency number (911 in the [address]) or go to the emergency department if:   · You have trouble breathing or shortness of breath at rest     · You have chest pain or pressure that lasts longer than 5 minutes  · You become confused or hard to wake  · Your lips or face are blue  · You have a fever of 104°F (40°C) or higher  Call your doctor if:   · You do not  have symptoms of COVID-19 but had close physical contact within 14 days with someone who tested positive  · You have questions or concerns about your condition or care  How COVID-19 is diagnosed: If you think you have COVID-19, call your healthcare provider  In some areas, testing is only done if a person has severe symptoms or is hospitalized  Testing is done more widely in other places  Your provider will tell you what to do based on your symptoms and the rules in your area  In general, the following may be used:  · A viral test  shows if you have a current infection  Samples are taken from your nose and throat, usually with swabs  You may need to wait several days to get the test results  Your healthcare provider will tell you how to get your results  You will need to quarantine (stay physically away from others) until you get your results  If results show you have COVID-19, you will need to quarantine until you are well  Your provider or other health official may give you more directions  You will also need to prevent another infection until it is known if you can get COVID-19 again  · An antibody test  shows if you had a past infection   Blood samples are used for this test  Antibodies are made by your immune system to attack the virus that causes COVID-19  Antibodies will form 1 to 3 weeks after you are infected  It is not known if antibodies prevent a second infection, or for how long a person might be protected  If you have antibodies, you will still need to be careful around others until more is known  · CT scans or x-rays  may be used to check for signs of pneumonia  The 2019 coronavirus causes a specific kind of pneumonia, usually in both lungs  Treatment:  No medicine or specific treatment is currently approved for COVID-19  The following may be used to manage your symptoms or treat the effects of COVID-19:  · Mild symptoms  may get better on their own  If you do not need to be treated in a hospital, you will be given instructions to use at home  Your condition will be closely monitored  You will need to watch for worsening symptoms and seek immediate care if needed  Talk to your healthcare provider about the following:    ? Relieve your symptoms  To soothe a sore throat, gargle with warm salt water, or use throat lozenges or a throat spray  Your healthcare provider may recommend a cough medicine  Drink more liquids to thin and loosen mucus and to prevent dehydration  Use decongestants or saline drops as directed for nasal congestion  ? NSAIDs or acetaminophen  can help lower a fever and relieve body aches or a headache  Follow directions  If not taken correctly, NSAIDs can cause kidney damage and acetaminophen can cause liver damage  · Severe or life-threatening symptoms  are treated in the hospital  You may need a combination of the following:    ? Medicines  may be given to reduce inflammation or to fight the virus  You may also need blood thinners to prevent or treat blood clots  If you have a deep vein thrombosis (DVT) or pulmonary embolism (PE), you may need to keep using blood thinners for 3 months  ? Extra oxygen  may be given if you have respiratory failure   This means your lungs cannot get enough oxygen into your blood and out to your organs  Extra oxygen can help prevent organ failure  ? A ventilator  may be used to help you breathe  ? Convalescent plasma (part of blood)  from a patient who has recovered from COVID-19 may be used  The plasma contains antibodies that can help your body fight the infection  Convalescent plasma is only given to patients who have severe signs and symptoms  How the 2019 coronavirus spreads: The virus spreads quickly and easily  You can become infected if you are in contact with a large amount of the virus, even for a short time  You can also become infected by being around a small amount of virus for a long time  The following are ways the virus is thought to spread, but more information may be coming:  · Droplets are the most common way all coronaviruses spread  The virus can travel in droplets that form when a person talks, coughs, or sneezes  Anyone who breathes in the droplets or gets them in his or her eyes can become infected with the virus  Close personal contact with an infected person is thought to be the main way the virus spreads  Close personal contact means you are within 6 feet (2 meters) of the person  · Person-to-person contact can spread the virus  For example, a person with the virus on his or her hands can spread it by shaking hands with someone  At this time, it does not appear that the virus can be passed to a baby during pregnancy or delivery  The baby can be infected after he or she is born through person-to-person contact  The virus also does not appear to spread in breast milk  If you are pregnant or breastfeeding, talk to your healthcare provider or obstetrician about any concerns you have  · The virus can stay on objects and surfaces  A person can get the virus on his or her hands by touching the object or surface  Infection happens if the person then touches his or her eyes or mouth with unwashed hands   It is not yet known how long the virus can stay on an object or surface  That is why it is important to clean all surfaces that are used regularly  · An infected animal may be able to infect a person who touches it  This may happen at live markets or on a farm  How everyone can lower the risk for COVID-19:  The best way to prevent infection is to avoid anyone who is infected, but this can be hard to do  An infected person can spread the virus before signs or symptoms begin, or even if signs or symptoms never develop  The following can help lower the risk for infection:      · Wash your hands often throughout the day  Use soap and water  Rub your soapy hands together, lacing your fingers  Wash the front and back of each hand, and in between your fingers  Use the fingers of one hand to scrub under the fingernails of the other hand  Wash for at least 20 seconds  Rinse with warm, running water for several seconds  Then dry your hands with a clean towel or paper towel  Use hand  that contains alcohol if soap and water are not available  Do not touch your eyes, nose, or mouth without washing your hands first  Teach children how to wash their hands and use hand   · Cover a sneeze or cough  This prevents droplets from traveling from you to others  Turn your face away and cover your mouth and nose with a tissue  Throw the tissue away  Use the bend of your arm if a tissue is not available  Then wash your hands well with soap and water or use hand   Turn and cover your face if you are around someone who is sneezing or coughing  Teach children how to cover a cough or sneeze  · Follow worldwide, national, and local social distancing guidelines  Social distancing means people avoid close physical contact so the virus cannot spread from one person to another  Keep at least 6 feet (2 meters) between you and others   Also keep this distance from anyone who comes to your home, such as someone making a delivery  · Make a habit of not touching your face  It is not known how long the virus can stay on objects and surfaces  If you get the virus on your hands, you can transfer it to your eyes, nose, or mouth and become infected  You can also transfer it to objects, surfaces, or people  Be aware of what you touch when you go out  Examples include handrails and elevator buttons  Try not to touch anything with bare hands unless it is necessary  Wash your hands before you leave your home and when you return  · Clean and disinfect high-touch surfaces and objects often  Use a disinfecting solution or wipes  You can make a solution by diluting 4 teaspoons of bleach in 1 quart (4 cups) of water  Clean and disinfect even if you think no one living in or coming to your home is infected with the virus  You can wipe items with a disinfecting cloth before you bring them into your home  Wash your hands after you handle anything you bring into your home  · Make your immune system as healthy as possible  A weakened immune system makes you more vulnerable to the new coronavirus  No COVID-19 vaccine is available yet  Vaccines such as the flu and pneumonia vaccines can help your immune system  Your healthcare provider can tell you which vaccines to get, and when to get them  Keep your immune system as strong as possible  Do not smoke  Eat healthy foods, exercise regularly, and try to manage stress  Go to bed and wake up at the same times each day  Social distancing guidelines:  National and local social distancing rules vary  Rules may change over time as restrictions are lifted  Restrictions may return if an outbreak happens where you live  It is important to know and follow all current social distancing rules in your area  The following are general guidelines:  · Limit trips out of your home  You may be able to have food, medicines, and other supplies delivered   If possible, have delivered items left at your door or other area  Try not to have someone hand you an item  You will be so close to the person that the virus can spread between you  · Do not have close physical contact with anyone who does not live in your home  Do not shake hands with, hug, or kiss a person as a greeting  Stand or walk as far from others as possible  If you must use public transportation (such as a bus or subway), try to sit or stand away from others  You can stay safely connected with others through phone calls, e-mail messages, social media websites, and video chats  Check in on anyone who may be having a hard time socially distancing, or who lives alone  Ask administrators at nursing homes or long-term care facilities how you can safely communicate with someone living there  · Wear a cloth face covering around others who do not live in your home  Face coverings help prevent the virus from spreading to others in droplets  You can use a clear face covering if someone needs to read your lips  This is a cloth covering that has plastic over the mouth area so your lips can be seen  Do not use coverings that have breathing valves or vents  The virus can travel out of the valve or vent and be spread to others  Do not take your covering off to talk, cough, or sneeze  Do not use coverings on children younger than 2 years or on anyone who has breathing problems or cannot remove it  · Only allow medical or other necessary professionals into your home  Wear your face covering, and remind professionals to wear a face covering  Remind them to wash their hands when they arrive and before they leave  Do not  let anyone who does not live in your home in, even if the person is not sick  A person can pass the virus to others before symptoms of COVID-19 begin  Some people never even develop symptoms  Children commonly have mild symptoms or no symptoms  It may be hard to tell a child not to hug or kiss you   Explain that this is how he or she can help you stay healthy  · Do not go to someone else's home unless it is necessary  Do not go over to visit, even if the person is lonely  Only go if you need to help him or her  Make sure you both wear face coverings while you are there  · Avoid large gatherings and crowds  Gatherings or crowds of 10 or more individuals can cause the virus to spread  Examples of gatherings include parties, sporting events, [employer] services, and conferences  Crowds may form at beaches, [place], and tourist attractions  Protect yourself by staying away from large gatherings and crowds  · Ask your healthcare provider for other ways to have appointments  You may be able to have appointments without having to go into the provider's office  Some providers offer phone, video, or other types of appointments  You may also be able to get prescriptions for a few months of your medicines at a time  · Stay safe if you must go out to work  You may have a job that can only be done outside your home  Keep physical distance between you and other workers as much as possible  Follow your employer's rules so everyone stays safe  If you have COVID-19 and are recovering at home:  Healthcare providers will give you specific instructions to follow  The following are general guidelines to remind you how to keep others safe until you are well:  · Wash your hands often  Use soap and water as much as possible  You can use hand  that contains alcohol if soap and water are not available  Do not share towels with anyone  If you use paper towels, throw them away in a lined trash can kept in your room or area  Use a covered trash can, if possible  · Do not go out of your home unless it is necessary  You may have to go to your healthcare provider's office for check-ups or to get prescription refills  Do not arrive at the provider's office without an appointment  Providers have to make their offices safe for staff and other patients      · Do not have close physical contact with anyone unless it is necessary  Only have close physical contact with a person giving direct care, or a baby or child you must care for  Family members and friends should not visit you  If possible, stay in a separate area or room of your home if you live with others  No one should go into the area or room except to give you care  You can visit with others by phone, video chat, e-mail, or similar systems  It is important to stay connected with others in your life while you recover  · Wear a face covering while others are near you  This can help prevent droplets from spreading the virus when you talk, sneeze, or cough  Put the covering on before anyone comes into your room or area  Remind the person to cover his or her nose and mouth before going in to provide care for you  · Do not share items  Do not share dishes, towels, or other items with anyone  Items need to be washed after you use them  · Protect your baby  Wash your hands with soap and water often throughout the day  Wear a clean face covering while you breastfeed, or while you express or pump breast milk  If possible, ask someone who is well to care for your baby  You can put breast milk in bottles for the person to use, if needed  Talk to your healthcare provider if you have any questions or concerns about caring for or bonding with your baby  He or she will tell you when to bring your baby in for check-ups and vaccines  He or she will also tell you what to do if you think your baby was infected with the new virus  · Do not handle live animals  Until more is known, it is best not to touch, play with, or handle live animals  Some animals, including pets, have been infected with the new coronavirus  Do not handle or care for animals until you are well  Care includes feeding, petting, and cuddling your pet  Do not let your pet lick you or share your food   Ask someone who is not infected to take care of your pet, if possible  If you must care for a pet, wear a face covering  Wash your hands before and after you give care  · Follow directions from your healthcare provider for being around others after you recover  You will need to wait at least 10 days after symptoms first appeared  Then you will need to have no fever for 24 hours without fever medicine, and no other symptoms  A loss of taste or smell may continue for several months  It is considered okay to be around others if this is your only symptom  It is not known for sure if or for how long a recovered person can pass the virus to others  Your provider may give you instructions, such as continuing social distancing or wearing a face covering around others  How to take care of someone who has COVID-19:  If the person lives in another home, arrange for a time to give care  Remember to bring a few pairs of disposable gloves and a cloth face covering  The following are general guidelines to help you safely care for anyone who has COVID-19:  · Wash your hands often  Wash before and after you go into the person's home, area, or room  Throw paper towels away in a lined trash can that has a lid, if possible  · Do not allow others to go near the person  No one should come into the person's home unless it is necessary  If possible, the person should be in a separate area or room if he or she lives with others  Keep the room's door shut unless you need to go in or out  Have others call, video chat, or e-mail the person if he or she is feeling well enough  The person may feel lonely if he or she is kept separate for a long period of time  Safe communication can help him or her stay connected to family and friends  · Make sure the person's room has good air flow  You may be able to open the window if the weather allows  An air conditioner can also be turned on to help air move  · Contact the person before you go in to give care    Make sure the person is wearing a face covering  Remind him or her to wash his or her hands with soap and water  He or she can use hand  that contains alcohol if soap and water are not available  Put on a face covering before you go in to give care  · Wear gloves while you give care and clean  Clean items the person uses often  Clean countertops, cooking surfaces, and the fronts and insides of the microwave and refrigerator  Clean the shower, toilet, the area around the toilet, the sink, the area around the sink, and faucets  Gather used laundry or bedding  Wash and dry items on the warmest settings the fabric allows  Wash dishes and silverware in hot, soapy water or in a   · Anything you throw away needs to go into a lined trash can  When you need to empty the trash, close the open end of the lining and tie it closed  This helps prevent items the virus is on from spilling out of the trash  Remove your gloves and throw them away  Wash your hands  Follow up with your doctor as directed:  Write down your questions so you remember to ask them during your visits  For more information:   · Centers for Disease Control and Prevention  [address] , [address]  Phone: 8- 455 - [phone]  Web Address: [employer] com br    © Copyright [address] Information is for End User's use only and may not be sold, redistributed or otherwise used for commercial purposes  All illustrations and images included in CareNotes® are the copyrighted property of A D A M [employer]  or [facility] Chiquita Marcos   The above information is an  only  It is not intended as medical advice for individual conditions or treatments  Talk to your doctor, nurse or pharmacist before following any medical regimen to see if it is safe and effective for you

## 2021-01-05 ENCOUNTER — TELEMEDICINE (OUTPATIENT)
Dept: INTERNAL MEDICINE CLINIC | Facility: CLINIC | Age: 41
End: 2021-01-05
Payer: COMMERCIAL

## 2021-01-05 VITALS — WEIGHT: 190 LBS | BODY MASS INDEX: 33.66 KG/M2 | TEMPERATURE: 99.5 F | HEART RATE: 79 BPM | HEIGHT: 63 IN

## 2021-01-05 DIAGNOSIS — J01.10 ACUTE NON-RECURRENT FRONTAL SINUSITIS: Primary | ICD-10-CM

## 2021-01-05 PROCEDURE — 99213 OFFICE O/P EST LOW 20 MIN: CPT | Performed by: INTERNAL MEDICINE

## 2021-01-05 RX ORDER — MOXIFLOXACIN HYDROCHLORIDE 400 MG/1
400 TABLET ORAL DAILY
Qty: 7 TABLET | Refills: 0 | Status: SHIPPED | OUTPATIENT
Start: 2021-01-05 | End: 2021-01-12

## 2021-01-05 NOTE — PROGRESS NOTES
Virtual Regular Visit      Assessment/Plan:  Follow up tomorrow    Problem List Items Addressed This Visit     None      Visit Diagnoses     Acute non-recurrent frontal sinusitis    -  Primary    Relevant Medications    moxifloxacin (AVELOX) 400 MG tablet               Reason for visit is   Chief Complaint   Patient presents with   [facility] Virtual Brief Visit     Victor Manuel Gonzalez [phone]  Pt c/o fever,cough, diarrhea, SOB  Pt's  COVID positive on 12/31/2020   Virtual Regular Visit        Encounter provider Suzanna Hwang DO    Provider located at [address] [place] [phone]      Recent Visits  No visits were found meeting these conditions  Showing recent visits within past 7 days and meeting all other requirements     Today's Visits  Date Type Provider Dept   01/05/21 Telemedicine Suzanna Hwang DO  [facility] today's visits and meeting all other requirements     Future Appointments  No visits were found meeting these conditions  Showing future appointments within next 150 days and meeting all other requirements        The patient was identified by name and date of birth  Didier Clark was informed that this is a telemedicine visit and that the visit is being conducted through [address] [place] [address] and patient was informed that this is not a secure, HIPAA-compliant platform  She agrees to proceed     My office door was closed  No one else was in the room  She acknowledged consent and understanding of privacy and security of the video platform  The patient has agreed to participate and understands they can discontinue the visit at any time  Patient is aware this is a billable service  Subjective  Didier Clark is a 38 y o  female  COVID (+)  Sh has had symptoms since Monday of the prior week  The patient was seen and examined and noted to have issues with viral syndrome like symptoms    Her  has COVID and she notes that she is not interested in testing for this  The patient notes no other issue at this time       Past Medical History:   Diagnosis Date    Disease of thyroid gland     Hypertension        Past Surgical History:   Procedure Laterality Date     SECTION      Twice    HYSTERECTOMY      Total        Current Outpatient Medications   Medication Sig Dispense Refill    levothyroxine 100 mcg tablet TAKE 1 TABLET (100 MCG TOTAL) BY MOUTH DAILY 90 tablet 3    metoprolol succinate (TOPROL-XL) 50 mg 24 hr tablet TAKE 1 TABLET BY MOUTH EVERY DAY 90 tablet 3    Multiple Vitamins-Minerals (MULTIVITAL PO) Take 1 capsule by mouth daily        moxifloxacin (AVELOX) 400 MG tablet Take 1 tablet (400 mg total) by mouth daily for 7 days 7 tablet 0     No current facility-administered medications for this visit  Allergies   Allergen Reactions    Nuts Anaphylaxis     Anaphylaxis    Amoxicillin Hives    Penicillins Hives    Sulfamethoxazole-Trimethoprim Hives       Review of Systems   Constitutional: Positive for chills, fatigue and fever  HENT: Positive for ear pain, rhinorrhea, sinus pain and sore throat  Respiratory: Positive for cough  Cardiovascular: Positive for chest pain  Negative for palpitations  Gastrointestinal: Positive for diarrhea and nausea  Musculoskeletal: Positive for myalgias  Neurological: Positive for headaches  Video Exam    Vitals:    21 1519   Pulse: 79   Temp: 99 5 °F (37 5 °C)   Weight: 86 2 kg (190 lb)   Height: 5' 3" (1 6 m)       Physical Exam  Vitals signs and nursing note reviewed  Constitutional:       Appearance: She is well-developed  HENT:      Head: Normocephalic and atraumatic  Eyes:      Pupils: Pupils are equal, round, and reactive to light  Neck:      Musculoskeletal: Neck supple  Pulmonary:      Effort: Pulmonary effort is normal       Breath sounds: Normal breath sounds  Skin:     General: Skin is dry     Neurological: Mental Status: She is alert and oriented to person, place, and time  I spent 15 minutes directly with the patient during this visit      VIRTUAL VISIT DISCLAIMER    Kristopher Leonardo acknowledges that she has consented to an online visit or consultation  She understands that the online visit is based solely on information provided by her, and that, in the absence of a face-to-face physical evaluation by the physician, the diagnosis she receives is both limited and provisional in terms of accuracy and completeness  This is not intended to replace a full medical face-to-face evaluation by the physician  Kristopher Malissa understands and accepts these terms

## 2021-01-06 ENCOUNTER — TELEMEDICINE (OUTPATIENT)
Dept: INTERNAL MEDICINE CLINIC | Facility: CLINIC | Age: 41
End: 2021-01-06
Payer: COMMERCIAL

## 2021-01-06 VITALS — BODY MASS INDEX: 33.66 KG/M2 | HEIGHT: 63 IN | WEIGHT: 190 LBS | HEART RATE: 70 BPM

## 2021-01-06 DIAGNOSIS — U07.1 COVID-19: Primary | ICD-10-CM

## 2021-01-06 PROCEDURE — 1036F TOBACCO NON-USER: CPT | Performed by: INTERNAL MEDICINE

## 2021-01-06 PROCEDURE — 99213 OFFICE O/P EST LOW 20 MIN: CPT | Performed by: INTERNAL MEDICINE

## 2021-01-06 PROCEDURE — 3008F BODY MASS INDEX DOCD: CPT | Performed by: INTERNAL MEDICINE

## 2021-01-06 NOTE — PROGRESS NOTES
Virtual Regular Visit      Assessment/Plan:  The patient notes some improvement and stable O2 sats  We will follow up PRN  Problem List Items Addressed This Visit     None      Visit Diagnoses     [id number]    -  Primary               Reason for visit is   Chief Complaint   Patient presents with   [address] [phone]  1 day f/u  Pt states that she does feel a little better  Diarrhea started this morning  Pt unable to get temp thermometer broke   Virtual Regular Visit        Encounter provider Carlos Roberts DO    Provider located at [address] [id number]      Recent Visits  Date Type Provider Dept   01/05/21 Telemedicine Carlos Roberts, DO [facility] recent visits within past 7 days and meeting all other requirements     Today's Visits  Date Type Provider Dept   01/06/21 Telemedicine Carlos Roberts, DO [facility] today's visits and meeting all other requirements     Future Appointments  No visits were found meeting these conditions  Showing future appointments within next 150 days and meeting all other requirements        The patient was identified by name and date of birth  Magdalena Santo was informed that this is a telemedicine visit and that the visit is being conducted through [address] [place] [address] and patient was informed that this is not a secure, HIPAA-compliant platform  She agrees to proceed     My office door was closed  No one else was in the room  She acknowledged consent and understanding of privacy and security of the video platform  The patient has agreed to participate and understands they can discontinue the visit at any time  Patient is aware this is a billable service  Subjective  Magdalena Santo is a 38 y o  female The patient is noted to have issues with COVID  She is noted to be at day 10        The patient was seen and examined and noted to have problems with COVID symptoms  Her O2 sat is noted to be good and she notes no SOB  The patient has developed diarrhea  The patient notes no fever or chills  She notes no other issues at this time  Past Medical History:   Diagnosis Date    Disease of thyroid gland     Hypertension        Past Surgical History:   Procedure Laterality Date     SECTION      Twice    HYSTERECTOMY      Total        Current Outpatient Medications   Medication Sig Dispense Refill    levothyroxine 100 mcg tablet TAKE 1 TABLET (100 MCG TOTAL) BY MOUTH DAILY 90 tablet 3    metoprolol succinate (TOPROL-XL) 50 mg 24 hr tablet TAKE 1 TABLET BY MOUTH EVERY DAY 90 tablet 3    moxifloxacin (AVELOX) 400 MG tablet Take 1 tablet (400 mg total) by mouth daily for 7 days 7 tablet 0    Multiple Vitamins-Minerals (MULTIVITAL PO) Take 1 capsule by mouth daily         No current facility-administered medications for this visit  Allergies   Allergen Reactions    Nuts Anaphylaxis     Anaphylaxis    Amoxicillin Hives    Penicillins Hives    Sulfamethoxazole-Trimethoprim Hives       Review of Systems   Constitutional: Positive for fatigue  Negative for chills and fever  HENT: Positive for postnasal drip, rhinorrhea, sinus pressure and sore throat  Respiratory: Negative for cough, chest tightness and shortness of breath  Cardiovascular: Negative for chest pain, palpitations and leg swelling  Gastrointestinal: Positive for diarrhea  Negative for abdominal pain, constipation, nausea and vomiting  Genitourinary: Negative  Musculoskeletal: Positive for myalgias  Negative for arthralgias and back pain  Skin: Negative  Neurological: Negative  Psychiatric/Behavioral: Negative  Video Exam    Vitals:    21 1153   Pulse: 70   Weight: 86 2 kg (190 lb)   Height: 5' 3" (1 6 m)       Physical Exam  Vitals signs and nursing note reviewed  Constitutional:       Appearance: She is well-developed     HENT: Head: Normocephalic and atraumatic  Eyes:      Pupils: Pupils are equal, round, and reactive to light  Neck:      Musculoskeletal: Neck supple  Pulmonary:      Effort: Pulmonary effort is normal       Breath sounds: Normal breath sounds  Skin:     General: Skin is dry  Neurological:      Mental Status: She is alert and oriented to person, place, and time  I spent 15 minutes directly with the patient during this visit      VIRTUAL VISIT DISCLAIMER    Ole Alpers acknowledges that she has consented to an online visit or consultation  She understands that the online visit is based solely on information provided by her, and that, in the absence of a face-to-face physical evaluation by the physician, the diagnosis she receives is both limited and provisional in terms of accuracy and completeness  This is not intended to replace a full medical face-to-face evaluation by the physician  Ole Alpers understands and accepts these terms

## 2021-03-03 ENCOUNTER — APPOINTMENT (OUTPATIENT)
Dept: LAB | Facility: CLINIC | Age: 41
End: 2021-03-03
Payer: COMMERCIAL

## 2021-03-03 ENCOUNTER — OFFICE VISIT (OUTPATIENT)
Dept: INTERNAL MEDICINE CLINIC | Facility: CLINIC | Age: 41
End: 2021-03-03
Payer: COMMERCIAL

## 2021-03-03 VITALS
WEIGHT: 199.4 LBS | BODY MASS INDEX: 35.33 KG/M2 | RESPIRATION RATE: 16 BRPM | DIASTOLIC BLOOD PRESSURE: 86 MMHG | HEART RATE: 80 BPM | HEIGHT: 63 IN | TEMPERATURE: 96.3 F | SYSTOLIC BLOOD PRESSURE: 122 MMHG | OXYGEN SATURATION: 98 %

## 2021-03-03 DIAGNOSIS — Z86.16 PERSONAL HISTORY OF COVID-19: ICD-10-CM

## 2021-03-03 DIAGNOSIS — R00.1: ICD-10-CM

## 2021-03-03 DIAGNOSIS — R23.2 FACE GOES RED: ICD-10-CM

## 2021-03-03 DIAGNOSIS — R00.1: Primary | ICD-10-CM

## 2021-03-03 LAB
ACCELERATIONS > 10BPM: 67
ALBUMIN SERPL BCP-MCNC: 4 G/DL (ref 3.5–5)
ALP SERPL-CCNC: 83 U/L (ref 46–116)
ALT SERPL W P-5'-P-CCNC: 65 U/L (ref 12–78)
ANION GAP SERPL CALCULATED.3IONS-SCNC: 6 MMOL/L (ref 4–13)
AST SERPL W P-5'-P-CCNC: 26 U/L (ref 5–45)
BASOPHILS # BLD AUTO: 0.05 THOUSANDS/ΜL (ref 0–0.1)
BASOPHILS NFR BLD AUTO: 1 % (ref 0–1)
BILIRUB SERPL-MCNC: 0.24 MG/DL (ref 0.2–1)
BUN SERPL-MCNC: 17 MG/DL (ref 5–25)
CALCIUM SERPL-MCNC: 9.6 MG/DL (ref 8.3–10.1)
CHLORIDE SERPL-SCNC: 105 MMOL/L (ref 100–108)
CO2 SERPL-SCNC: 28 MMOL/L (ref 21–32)
CREAT SERPL-MCNC: 0.8 MG/DL (ref 0.6–1.3)
EOSINOPHIL # BLD AUTO: 0.16 THOUSAND/ΜL (ref 0–0.61)
EOSINOPHIL NFR BLD AUTO: 2 % (ref 0–6)
ERYTHROCYTE [DISTWIDTH] IN BLOOD BY AUTOMATED COUNT: 12.9 % (ref 11.6–15.1)
GFR SERPL CREATININE-BSD FRML MDRD: 92 ML/MIN/1.73SQ M
GLUCOSE P FAST SERPL-MCNC: 86 MG/DL (ref 65–99)
HCT VFR BLD AUTO: 43.1 % (ref 34.8–46.1)
HGB BLD-MCNC: 14 G/DL (ref 11.5–15.4)
IMM GRANULOCYTES # BLD AUTO: 0.02 THOUSAND/UL (ref 0–0.2)
IMM GRANULOCYTES NFR BLD AUTO: 0 % (ref 0–2)
LYMPHOCYTES # BLD AUTO: 1.86 THOUSANDS/ΜL (ref 0.6–4.47)
LYMPHOCYTES NFR BLD AUTO: 25 % (ref 14–44)
MCH RBC QN AUTO: 30.5 PG (ref 26.8–34.3)
MCHC RBC AUTO-ENTMCNC: 32.5 G/DL (ref 31.4–37.4)
MCV RBC AUTO: 94 FL (ref 82–98)
MONOCYTES # BLD AUTO: 0.75 THOUSAND/ΜL (ref 0.17–1.22)
MONOCYTES NFR BLD AUTO: 10 % (ref 4–12)
NEUTROPHILS # BLD AUTO: 4.75 THOUSANDS/ΜL (ref 1.85–7.62)
NEUTS SEG NFR BLD AUTO: 62 % (ref 43–75)
NRBC BLD AUTO-RTO: 0 /100 WBCS
PLATELET # BLD AUTO: 219 THOUSANDS/UL (ref 149–390)
PMV BLD AUTO: 12.3 FL (ref 8.9–12.7)
POTASSIUM SERPL-SCNC: 4.4 MMOL/L (ref 3.5–5.3)
PROT SERPL-MCNC: 8 G/DL (ref 6.4–8.2)
RBC # BLD AUTO: 4.59 MILLION/UL (ref 3.81–5.12)
SODIUM SERPL-SCNC: 139 MMOL/L (ref 136–145)
T4 FREE SERPL-MCNC: 1.15 NG/DL (ref 0.76–1.46)
TSH SERPL DL<=0.05 MIU/L-ACNC: 1.96 UIU/ML (ref 0.36–3.74)
WBC # BLD AUTO: 7.59 THOUSAND/UL (ref 4.31–10.16)

## 2021-03-03 PROCEDURE — 84439 ASSAY OF FREE THYROXINE: CPT

## 2021-03-03 PROCEDURE — 84443 ASSAY THYROID STIM HORMONE: CPT

## 2021-03-03 PROCEDURE — 99214 OFFICE O/P EST MOD 30 MIN: CPT | Performed by: NURSE PRACTITIONER

## 2021-03-03 PROCEDURE — 80053 COMPREHEN METABOLIC PANEL: CPT

## 2021-03-03 PROCEDURE — 93000 ELECTROCARDIOGRAM COMPLETE: CPT | Performed by: NURSE PRACTITIONER

## 2021-03-03 PROCEDURE — 85025 COMPLETE CBC W/AUTO DIFF WBC: CPT

## 2021-03-03 PROCEDURE — 36415 COLL VENOUS BLD VENIPUNCTURE: CPT

## 2021-03-03 NOTE — ASSESSMENT & PLAN NOTE
· EKG in office: NSP, HR 98  · While exercising HR did drop to 48  · Occasional low HR while exercising (HR was appropriate while walking on the treadmill - 's; however, dropped to 48 and patient had subsequent chest discomfort over the left pectoral muscle, radiating to left shoulder and down left arm to her hand, this made her tired and she took a 2 hour nap  · Chest pressure/pain/tightness/aching/dull pressure which can radiate to her left shoulder and makes her left hand numb     · Referral to cardiology for continued workup: Echo

## 2021-03-03 NOTE — ASSESSMENT & PLAN NOTE
· Entire face red, hot, seems dry  · Redness moves down onto her neck  · Took benadryl - no effect noticed  · Lasts 2-3 days

## 2021-03-03 NOTE — PROGRESS NOTES
Assessment/Plan:    Problem List Items Addressed This Visit        Cardiovascular and Mediastinum    Regular sinus bradycardia - Primary     · EKG in office: NSP, HR 98  · While exercising HR did drop to 48  · Occasional low HR while exercising (HR was appropriate while walking on the treadmill - 's; however, dropped to 48 and patient had subsequent chest discomfort over the left pectoral muscle, radiating to left shoulder and down left arm to her hand, this made her tired and she took a 2 hour nap  · Chest pressure/pain/tightness/aching/dull pressure which can radiate to her left shoulder and makes her left hand numb  · Referral to cardiology for continued workup: Echo         Relevant Orders    POCT ECG    TSH, 3rd generation    T4, free    Comprehensive metabolic panel    CBC and differential    Ambulatory referral to Cardiology       Other    Personal history of covid-19     · Positive in January of 2021  · With elevated blood pressures in the evening (150/80's)  · Occasional low HR while exercising (HR was appropriate while walking on the treadmill - 's; however, dropped to 48 and patient had subsequent chest discomfort over the left pectoral muscle, radiating to left shoulder and down left arm to her hand, this made her tired and she took a 2 hour nap  · EKG in office  · Cardiology Referral for continued workup with possible Echo         Relevant Orders    TSH, 3rd generation    T4, free    Comprehensive metabolic panel    CBC and differential    Ambulatory referral to Cardiology    Face goes red     · Entire face red, hot, seems dry  · Redness moves down onto her neck  · Took benadryl - no effect noticed  · Lasts 2-3 days              Diagnoses and all orders for this visit:    Regular sinus bradycardia  -     POCT ECG  -     TSH, 3rd generation; Future  -     T4, free; Future  -     Comprehensive metabolic panel;  Future  -     CBC and differential; Future  -     Ambulatory referral to Cardiology; Future    Personal history of covid-19  -     TSH, 3rd generation; Future  -     T4, free; Future  -     Comprehensive metabolic panel; Future  -     CBC and differential; Future  -     Ambulatory referral to Cardiology; Future    Face goes red     Regular sinus bradycardia  · EKG in office: NSP, HR 98  · While exercising HR did drop to 48  · Occasional low HR while exercising (HR was appropriate while walking on the treadmill - 's; however, dropped to 48 and patient had subsequent chest discomfort over the left pectoral muscle, radiating to left shoulder and down left arm to her hand, this made her tired and she took a 2 hour nap  · Chest pressure/pain/tightness/aching/dull pressure which can radiate to her left shoulder and makes her left hand numb  · Referral to cardiology for continued workup: Echo    Personal history of covid-19  · Positive in January of 2021  · With elevated blood pressures in the evening (150/80's)  · Occasional low HR while exercising (HR was appropriate while walking on the treadmill - 's; however, dropped to 48 and patient had subsequent chest discomfort over the left pectoral muscle, radiating to left shoulder and down left arm to her hand, this made her tired and she took a 2 hour nap  · EKG in office  · Cardiology Referral for continued workup with possible Echo    Face goes red  · Entire face red, hot, seems dry  · Redness moves down onto her neck  · Took benadryl - no effect noticed  · Lasts 2-3 days      Subjective:      Patient ID: Kj Lassiter is a 38 y o  female  Patient with diagnosed COVID-19 in January of 2021  Occasional low HR while exercising (HR was appropriate while walking on the treadmill - 's; however, dropped to 48 and patient had subsequent chest discomfort over the left pectoral muscle, radiating to left shoulder and down left arm to her hand, this made her tired and she took a 2 hour nap   Positive for shortness of breath, chest discomfort, intermittent dizziness, blurred vision/fogginess, achy joints, migraines, and red rash on her face intermittently  The following portions of the patient's history were reviewed and updated as appropriate:   Past Medical History:  She has a past medical history of COVID-19, Disease of thyroid gland, and Hypertension  ,  _______________________________________________________________________  Medical Problems:  does not have any pertinent problems on file ,  _______________________________________________________________________  Past Surgical History:   has a past surgical history that includes  section and Hysterectomy  ,  _______________________________________________________________________  Family History:  family history includes Asthma in her father; Hypertension in her father and mother; Muscular dystrophy in her mother; Raynaud syndrome in her sister  ,  _______________________________________________________________________  Social History:   reports that she quit smoking about 18 years ago  Her smoking use included cigarettes  She has never used smokeless tobacco  She reports current alcohol use of about 1 0 - 2 0 standard drinks of alcohol per week  She reports that she does not use drugs  ,  _______________________________________________________________________  Allergies:  is allergic to nuts; amoxicillin; penicillins; and sulfamethoxazole-trimethoprim     _______________________________________________________________________  Current Outpatient Medications   Medication Sig Dispense Refill    levothyroxine 100 mcg tablet TAKE 1 TABLET (100 MCG TOTAL) BY MOUTH DAILY 90 tablet 3    metoprolol succinate (TOPROL-XL) 50 mg 24 hr tablet TAKE 1 TABLET BY MOUTH EVERY DAY 90 tablet 3    Multiple Vitamins-Minerals (MULTIVITAL PO) Take 1 capsule by mouth daily         No current facility-administered medications for this visit  _______________________________________________________________________  Review of Systems   Constitutional: Positive for fatigue  Negative for activity change, chills and fever  HENT: Negative for rhinorrhea and sore throat  Eyes: Negative for pain  Respiratory: Positive for shortness of breath  Negative for cough  Cardiovascular: Positive for chest pain  Negative for palpitations and leg swelling  Bilateral hands/fingers edematous - unable to wear rings  Gastrointestinal: Negative for abdominal pain, constipation, diarrhea, nausea and vomiting  Genitourinary: Negative for difficulty urinating, flank pain, frequency and urgency  Musculoskeletal: Positive for arthralgias and myalgias  Negative for gait problem  Skin: Positive for color change  Neurological: Positive for dizziness, light-headedness and headaches  Negative for weakness  Psychiatric/Behavioral: Negative for sleep disturbance  The patient is not nervous/anxious  All other systems reviewed and are negative  Objective:  Vitals:    03/03/21 0758   BP: 122/86   BP Location: Right arm   Patient Position: Sitting   Cuff Size: Standard   Pulse: 80   Resp: 16   Temp: (!) 96 3 °F (35 7 °C)   SpO2: 98%   Weight: 90 4 kg (199 lb 6 4 oz)   Height: 5' 3" (1 6 m)     Body mass index is 35 32 kg/m²  Physical Exam  Vitals signs reviewed  Constitutional:       General: She is awake  Appearance: Normal appearance  She is well-developed  HENT:      Head: Normocephalic and atraumatic  Nose: Nose normal       Mouth/Throat:      Mouth: Mucous membranes are moist    Eyes:      Extraocular Movements: Extraocular movements intact  Neck:      Musculoskeletal: Normal range of motion  Cardiovascular:      Rate and Rhythm: Normal rate and regular rhythm  Pulses: Normal pulses  Heart sounds: Normal heart sounds  Comments: Bilateral hands/fingers edematous - unable to wear rings     Pulmonary: Effort: Pulmonary effort is normal       Breath sounds: Normal breath sounds  Chest:      Comments: Chest discomfort: pain/dull ache/ sharp from left pectoral area to left shoulder and down left arm to her fingers  Abdominal:      General: Bowel sounds are normal       Palpations: Abdomen is soft  Musculoskeletal: Normal range of motion  Right lower leg: No edema  Left lower leg: No edema  Skin:     General: Skin is warm and dry  Comments: Occasional full red face that does spread to her neck  Neurological:      Mental Status: She is alert and oriented to person, place, and time  Psychiatric:         Attention and Perception: Attention normal          Mood and Affect: Mood normal          Speech: Speech normal          Behavior: Behavior normal  Behavior is cooperative             PHQ-9 Depression Screening    PHQ-9:   Frequency of the following problems over the past two weeks:      Little interest or pleasure in doing things: 0 - not at all  Feeling down, depressed, or hopeless: 0 - not at all

## 2021-03-03 NOTE — PATIENT INSTRUCTIONS
COVID-19 and Chronic Health Conditions   WHAT YOU NEED TO KNOW:   Your chronic condition may increase your risk for COVID-19 or serious problems it can cause, such as pneumonia  Healthcare providers might need to make changes that affect how you usually manage your chronic health condition  Providers may change hours of operation or not have patients come in to be seen  You may not be able to make appointments to get blood drawn or to have tests or procedures  This may continue until the virus that causes COVID-19 is controlled  Until then, you can take steps to manage your condition  The steps will also lower your risk for COVID-19 or the serious problems it causes  DISCHARGE INSTRUCTIONS:   If you think you may be infected with the new coronavirus,  do the following to protect others:  · If emergency care is needed,  tell the  about the possible infection, or call ahead and tell the emergency department  · Call a healthcare provider  for instructions if symptoms are mild  Do not  arrive without calling first  Your provider will need to protect staff members and other patients  · Cover your mouth and nose  while you are getting medical care  This will help lower the risk of infecting others  Call your local emergency number [phone] in the [address] [place] [address],[address]) or emergency department if:   · You have trouble breathing or shortness of breath  · You have chest pain or pressure that lasts longer than 5 minutes  · You become confused or hard to wake  · Your lips or face are blue  · You have a fever of 104°F (40°C) or higher  Call your doctor or healthcare provider if:   · You do not  have symptoms of COVID-19 but had close physical contact within 14 days with someone who tested positive  · You have questions or concerns about your COVID-19 or your chronic condition      The following may increase your risk for serious effects of COVID-19:   · Age 72 years or older    · Obesity, diabetes, cancer, or a liver disease    · Kidney failure, chronic kidney disease, or sickle cell disease    · Lung disease, COPD, moderate-to-severe asthma, cystic fibrosis, or pulmonary fibrosis (scarring in your lungs)    · A severe heart disease, such as coronary artery disease or heart failure    · A brain blood vessel disorder or disease, or a condition such as dementia    · Living in a nursing home or long-term care facility    · Smoking cigarettes    · Hypertension (high blood pressure) or thalassemia    · An immune system problem, HIV or AIDS, or a blood or bone marrow transplant    · Long-term use of certain medicines, such as corticosteroids    · Pregnancy    Manage your chronic health condition during this time:  If you do not have a regular healthcare provider, experts recommend you contact a local [facility] health center or health department  The following can help you manage your condition and prevent COVID-19:  · Get emergency care for your condition if needed  Talk to your healthcare providers about symptoms of your chronic condition that need immediate care  Your providers can help you create a plan or add exacerbation management to your plan  The plan will include when to go to an emergency department and when to call your local emergency number  This will depend on where you live and the services that are available during this time  · Go to dialysis appointments as scheduled  It is important to stay on schedule  You will need to have enough food to be able to follow the emergency diet plan if you must miss a session  The emergency diet needs to be part of the management plan for your condition  · Reschedule any upcoming appointments as needed  Medical facilities may be closed until the new coronavirus is better controlled  This means you may need to reschedule a surgery, procedure, or check-up appointment  If you cannot have a phone or video appointment, you will need to make a new appointment   Some providers may be scheduling appointments several months in advance  Some surgeries and procedures will happen as scheduled  This depends on the medical condition and the reason for the surgery or procedure  You may need to have extra testing for COVID-19 several days before  · Follow any regular management plan you use  Your healthcare provider will tell you if you need to make any changes to your regular management plan  For example, if you have asthma, continue to follow your asthma action plan  If you have diabetes, you may need to check your blood sugar level more often  Stress and illness can make blood sugar levels go up  You may need to adjust medicine such as insulin  If you have a heart condition or high blood pressure, you may need to check your blood pressure more often  Stress and illness can also raise your blood pressure  · Talk to your healthcare providers about your medicines  You may be able to get more than 1 month of medicine at a time  This will lower the number of times you need to go to a pharmacy to get your medicines  Make sure you have enough medicine if you have a condition that can lead to an emergency  Examples include asthma medicines, insulin, or an epinephrine pen  Check the expiration dates on the medicines you currently have  Ask for refills as soon as possible, if needed  If it is not time to refill prescriptions, you may be able to get an emergency supply of some medicines  Medicine plans vary, so ask your healthcare provider or pharmacist for options  · Have supplies available in your home  If possible, get extra supplies you use regularly  Examples include absorbent pads, syringes, and wound cleaning solutions  This will limit the number of trips out of your home to get supplies  · Know the signs and symptoms of COVID-19  Signs and symptoms usually start about 5 days after infection but can take 2 to 14 days  Signs and symptoms range from mild to severe   You may feel like you have the flu or a bad cold  Your chronic health condition may cause some of the same symptoms COVID-19 causes  This can make it hard to know if a symptom is from COVID-19 or your chronic condition  Keep a record of any new or worsening symptom you have  This is especially important if you have a condition that often causes shortness of breath  Your provider can tell you if you should be tested for COVID-19  Information is still being learned  Tell your healthcare provider if you think you were infected but develop signs or symptoms not listed below:    ? A cough    ? Shortness of breath or trouble breathing that may become severe    ? A fever of at least 100 4°F, or 38°C (may be lower in adults 65 or older)    ? Chills that might include shaking    ? Muscle pain, body aches, or a headache    ? A sore throat    ? Suddenly not being able to taste or smell anything    ? Feeling very tired (fatigue)    ? Congestion (stuffy head and nose), or a runny nose    ? Diarrhea, nausea, or vomiting    What you can do to prevent having to go out of your home during this time:   · Ask your healthcare provider for other ways to have appointments  You may be able to have appointments without having to go into the provider's office  Some providers offer phone, video, or other types of appointments  · Have food, medicines, and other supplies delivered  Some pharmacies can send certain medicines to you through the mail  Grocery stores and restaurants may be able to deliver food and other items  If possible, have delivered items placed somewhere  Try not to have someone hand you an item  You will be so close to the person that the virus can spread between you  Wash your hands after you put the delivered items away  · Ask someone to get items you need  The person can get groceries, medicines, or other needed items for you  Choose a person who does not have signs or symptoms of COVID-19 or has tested negative for it   The person should not be waiting for test results  He or she should not have a condition that increases the risk for COVID-19 or serious problems it causes  Lower your risk for COVID-19:  The virus that causes COVID-19 spreads quickly and easily  It mainly spreads through droplets that form when a person talks, coughs, or sneezes  An infected person may also be able to leave the virus on objects and surfaces  Another person can get the virus on his or her hands by touching the object or surface  Infection happens if the person then touches his or her eyes or mouth with unwashed hands  The best way to prevent infection is to avoid anyone who is infected, but this can be hard to do  An infected person can spread the virus before signs or symptoms begin, or even if signs or symptoms never develop  The following are ways to prevent the spread of the virus and lower your risk for COVID-19:      · Wash your hands often throughout the day  Use soap and water  Rub your soapy hands together, lacing your fingers  Wash the front and back of each hand, and in between your fingers  Use the fingers of one hand to scrub under the fingernails of the other hand  Wash for at least 20 seconds  Rinse with warm, running water for several seconds  Then dry your hands with a clean towel or paper towel  Use hand  that contains alcohol if soap and water are not available  Do not touch your eyes, nose, or mouth without washing your hands first  Teach children how to wash their hands  · Cover sneezes and coughs  Turn your face away and cover your mouth and nose with a tissue  Throw the tissue away  Use the bend of your arm if a tissue is not available  Then wash your hands well with soap and water or use hand   Turn your head and cover your face if someone near you is coughing or sneezing  Teach children how to cover a cough or sneeze  Remind them to wash their hands  · Make a habit of not touching your face    If you get the virus on your hands, you can transfer it to your eyes, nose, or mouth and become infected  · Clean and disinfect high-touch surfaces and objects in your home often  Do this regularly, even if you think no one living in or coming to your home is infected with the virus  Surfaces include countertops, cupboard doors, desks, handles, handrails, doorknobs, toilet handles, faucets, chairs, and light switches  Objects include keys, phones, computer keyboards and mice, video games, remote controls, and children's toys  Some surfaces and objects may need to be cleaned several times each day, depending on how often they are used  ? Use disinfecting wipes or a disinfecting solution  You can make a solution by mixing 4 teaspoons of bleach with 1 quart (4 cups) of water  Clean with a sponge or cloth that can be thrown away or washed in hot, soapy water and reused  Clean used dishes and utensils in hot, soapy water or in the   ? Be careful with   Do not use any cleaning or disinfecting products that can trigger an asthma attack or other breathing problems  Open windows or have circulating air as you clean  Do not  mix ammonia with bleach  This will create toxic fumes  Read the labels of all products you use  · Ask about vaccines you may need  No vaccine is available yet for the new coronavirus  It is still a good idea to get other vaccines to help protect your immune system  Get the influenza (flu) vaccine as soon as recommended each year, usually starting in September or October  A flu infection can make COVID-19 worse if you have them at the same time  The flu can also cause signs and symptoms that are similar to COVID-19  Get the pneumonia vaccine if recommended  Your healthcare provider can tell you if you also need other vaccines, and when to get them    Follow social distancing guidelines if you must go out of your home during this time:  Social distancing means people stay far enough apart physically that the virus cannot spread from one person to another  National and local social distancing rules vary  Rules may change over time as restrictions are lifted, but they may return if an outbreak happens where you live  It is important to know and follow all current social distancing rules in your area  The following are general guidelines:  · Limit trips out of your home  Most local guidelines allow leaving the home to buy food or supplies, or to seek medical care if necessary  · Stay at least 6 feet (2 meters) away from anyone who does not live in your home  Do not shake hands with, hug, or kiss a person as a greeting  Stand or walk as far from others as possible, especially around anyone who is sneezing or coughing  If you must use public transportation (such as a bus or subway), try to sit or stand away from others  You can stay safely connected with others through phone calls, e-mail messages, social media websites, and video chats  Check in on anyone who may be having a hard time socially distancing, or who lives alone  Ask administrators at nursing homes or long-term care facilities how you can safely communicate with someone living there  · Wear a cloth face covering (mask) when you must go out  Only do this if your chronic condition does not cause breathing problems  You can make a face covering out of a thick cloth  This will save medical masks for healthcare workers and first responders  Choose fabric that holds its shape after it is washed and dried, such as cotton  Put the top half of a paper coffee filter in the middle of the fabric to create an extra filter for you  Check that you can breathe through the fabric when it is folded into several layers  Fold the fabric into a long band  Put each end through a rubber band or hair tie to create ear loops  Fold the ends of the fabric toward the middle  Hold the covering to your face   Adjust it so it covers your nose and mouth completely  Hook the loops onto your ears to keep the covering in place  The following are important points to remember:     ? Do not use a plastic face shield instead of a cloth covering  A face shield will not protect others from droplets  If a face shield must be used, choose one that wraps around both sides of the face and goes below the chin  Do not use disposable shields more than 1 time  Ceci Foot that can be used again need to be cleaned and disinfected between uses  Do not put a face shield or a cloth covering on a  or infant  These increase the risk for sudden infant death syndrome (SIDS)  ? Continue social distancing while you are out  A face covering does not mean you can have close physical contact with others  You still need to stay at least 6 feet (2 meters) away from others  ? Do not touch your face covering or eyes while you are wearing the covering  Your eyes will not be covered by the cloth  You can be infected if the virus is on your hand and you touch your eyes  Wash your hands with soap and water for 20 seconds or use hand  before you remove your face covering  ? Do not remove your face covering to talk, sneeze, or cough  Your face covering will help protect you and others around you  ? Wash face coverings often  Wash them in a washing machine in the warmest water the fabric allows  Make sure the fabric is completely dry before you use the face covering again  Only wear clean coverings when you go out  Use a new coffee filter each time  ? Certain individuals should not use face coverings  Do not put a face covering on anyone who is younger than 2 years  Ramsay Loosen children may not be able to breathe through the covering  Do not put a face covering on anyone who cannot remove it by himself or herself  ? You can use a clear face covering if others need to read your lips    A clear covering may be helpful if you communicate with someone who is deaf or hard of hearing  A clear covering is made of cloth but has plastic over the mouth area  This will help the person see your lips more easily  Do not use a plastic face shield instead  ? Do not use face coverings that have breathing valves or vents  Valves or vents on the sides are designed to allow breathed air to go out  This makes breathing easier, but the virus can travel out of the valve or vent and be spread to others  · Do not have anyone over to your home unless it is necessary  It is okay to have medical workers in to provide care  Wear your face covering, and remind medical workers to wear a mask or face covering  Remind them to wash their hands when they arrive and before they leave  Do not  have family members, friends, or neighbors over, even if they are not sick  A person can pass the new virus to others before symptoms of COVID-19 begin  Some people never even develop symptoms  Children commonly have mild symptoms or no symptoms  It is important that you continue social distancing with everyone, including children  It may be hard to tell a child not to hug or kiss you  Explain that this is how he or she can help you stay healthy  · Do not go to someone else's home unless it is necessary  Do not go over to visit, even if the person is lonely  Only go if you need to help him or her  · Avoid large gatherings and crowds  Gatherings or crowds of 10 or more individuals can cause the virus to spread  Examples of gatherings include parties, sporting events, Hinduism services, and conferences  Crowds may form at beaches, [place], and tourist attractions  You can continue to protect yourself by staying away from large gatherings and crowds  · Stay safe if you must go out to work  You may have a job only done outside your home that is considered essential  Keep physical distance between you and other workers as much as possible  Follow your employer's rules so everyone stays safe      Help strengthen your immune system:   · Do not smoke  Nicotine and other chemicals in cigarettes and cigars can cause lung damage and increase your risk for infections such as bronchitis or pneumonia  Ask your healthcare provider for information if you currently smoke and need help to quit  E-cigarettes or smokeless tobacco still contain nicotine  Talk to your healthcare provider before you use these products  · Eat a variety of healthy foods  Examples include vegetables, fruits, whole-grain breads and cereals, lean meats and poultry, fish, low-fat dairy products, and cooked beans  Healthy foods contain nutrients that help keep your immune system strong  · Find ways to manage stress  You may be feeling more stressed than usual because of the COVID-19 outbreak  The situation is very stressful to many people  Talk to your healthcare providers about ways to manage stress during this time  Stress can lead to breathing problems or make the problems worse  Stress can trigger an attack or exacerbation of many health conditions  It is important to do things that help you feel more relaxed, such as the following:     ? Pick 1 or 2 times a day to watch the news  Constant news watching can increase your stress levels  ? Talk to a friend on the phone or through a video chat  ? Take a warm, soothing bath  ? Listen to music  ? Exercise can also help relieve stress  This may be hard if your regular gym or outdoor exercise area is closed  If you do not have exercise equipment at home, try walking inside your home  You can walk quickly or turn on music and dance  Follow up with your doctor or healthcare provider as directed: Your providers will tell you when you can come in for tests, procedures, or check-ups  Bring your symptom record with you to all appointments  Write down your questions so you remember to ask them during your visits    For more information:   · Centers for Disease Control and Prevention  [address]   [place] , [address] [place] [address]  Phone: 2- 021 - 1964708  Phone: 2- 224 - 3625716  Web Address: [employer] com br    © Copyright [address] Information is for End User's use only and may not be sold, redistributed or otherwise used for commercial purposes  All illustrations and images included in CareNotes® are the copyrighted property of A D A M , Inc  or [place] [employer]   The above information is an  only  It is not intended as medical advice for individual conditions or treatments  Talk to your doctor, nurse or pharmacist before following any medical regimen to see if it is safe and effective for you  Bradycardia   WHAT YOU NEED TO KNOW:   Bradycardia is a slow heart rate, usually fewer than 60 beats per minute  A slow heart rate is normal for some people, such as athletes, and needs no treatment  Bradycardia may also be caused by health conditions that do need treatment  Your healthcare provider will tell you what heart rate is too low for you  DISCHARGE INSTRUCTIONS:   Call your local emergency number (911 in the [address] [place] [address],[address]) if:   · You have new or worsening dizziness, shortness of breath, chest pain, or confusion  Call your doctor or cardiologist if:   · You feel lightheaded or faint  · Your pulse rate is lower than healthcare providers say it should be, even with treatment  · You are more tired than usual, even with treatment  · You have questions or concerns about your condition or care  Medicines: Your healthcare provider may make changes to medicine you currently take if it causes your symptoms  · Medicines  may be given to increase your heart rate and help your symptoms  You may also need medicine to treat a condition that is causing your symptoms  · Take your medicine as directed  Contact your healthcare provider if you think your medicine is not helping or if you have side effects  Tell him or her if you are allergic to any medicine   Keep a list of the medicines, vitamins, and herbs you take  Include the amounts, and when and why you take them  Bring the list or the pill bottles to follow-up visits  Carry your medicine list with you in case of an emergency  Heart monitoring at home: You may need to use a heart monitor at home to provide more information about your condition  This device is also called a Holter monitor, event monitor, or mobile telemetry  Bring your monitor with you to follow-up visits with your healthcare provider or cardiologist  Ask for more information about the Holter monitor  Manage or prevent bradycardia:   · Keep a record of your symptoms  Include when you have bradycardia, and what you were doing when it started  Also include anything that made your symptoms better or worse  Bring the record to follow-up visits with your healthcare providers  · Reach or maintain a healthy weight  Your healthcare provider can tell you what a healthy weight is for you  He or she can help you create a weight loss plan if needed  Weight loss can help strengthen your heartbeat  If you have sleep apnea, weight loss may help you breathe more regularly while you sleep  · Exercise as directed  Exercise can help strengthen your heart  It can also help you manage your weight and improve your sleep  Your healthcare provider can help you create an exercise plan  He or she may recommend 30 to 40 minutes of exercise most days of the week  · Eat a variety of healthy foods  Healthy foods include fruits, vegetables, whole-grain breads and cereals, low-fat dairy products, lean meats, fish, and cooked beans  Depending on the cause of your bradycardia, your healthcare provider may recommend a heart healthy diet  This diet is low in sodium (salt) and unhealthy fats  A dietitian or your healthcare provider can help you create a heart healthy diet  · Do not smoke    Nicotine and other chemicals in cigarettes and cigars can cause heart and lung damage  Ask your healthcare provider for information if you currently smoke and need help to quit  E-cigarettes or smokeless tobacco still contain nicotine  Talk to your healthcare provider before you use these products  For more information:   · [address]  [place] , [place]   Phone: 4- 618 - 812-1504  Web Address: https://[employer]/  [employer]     Follow up with your healthcare provider or cardiologist as directed:  Write down your questions so you remember to ask them during your visits  You may need to see specialists for more treatment  If you have a pacemaker, your cardiologist needs to make sure that it is working as it should  © Copyright [address] [place] [address] Information is for End User's use only and may not be sold, redistributed or otherwise used for commercial purposes  All illustrations and images included in CareNotes® are the copyrighted property of [employer]  or [facility]   The above information is an  only  It is not intended as medical advice for individual conditions or treatments  Talk to your doctor, nurse or pharmacist before following any medical regimen to see if it is safe and effective for you

## 2021-03-03 NOTE — ASSESSMENT & PLAN NOTE
· Positive in January of 2021  · With elevated blood pressures in the evening (150/80's)  · Occasional low HR while exercising (HR was appropriate while walking on the treadmill - 's; however, dropped to 48 and patient had subsequent chest discomfort over the left pectoral muscle, radiating to left shoulder and down left arm to her hand, this made her tired and she took a 2 hour nap     · EKG in office  · Cardiology Referral for continued workup with possible Echo

## 2021-03-11 ENCOUNTER — CONSULT (OUTPATIENT)
Dept: CARDIOLOGY CLINIC | Facility: CLINIC | Age: 41
End: 2021-03-11
Payer: COMMERCIAL

## 2021-03-11 VITALS
HEART RATE: 68 BPM | DIASTOLIC BLOOD PRESSURE: 82 MMHG | BODY MASS INDEX: 35.43 KG/M2 | WEIGHT: 199.96 LBS | HEIGHT: 63 IN | SYSTOLIC BLOOD PRESSURE: 124 MMHG

## 2021-03-11 DIAGNOSIS — R00.1: ICD-10-CM

## 2021-03-11 DIAGNOSIS — R07.9 CHEST PAIN, UNSPECIFIED TYPE: ICD-10-CM

## 2021-03-11 DIAGNOSIS — I10 ESSENTIAL HYPERTENSION: ICD-10-CM

## 2021-03-11 DIAGNOSIS — Z86.16 PERSONAL HISTORY OF COVID-19: Primary | ICD-10-CM

## 2021-03-11 PROCEDURE — 3074F SYST BP LT 130 MM HG: CPT | Performed by: NURSE PRACTITIONER

## 2021-03-11 PROCEDURE — 99244 OFF/OP CNSLTJ NEW/EST MOD 40: CPT | Performed by: NURSE PRACTITIONER

## 2021-03-11 PROCEDURE — 3079F DIAST BP 80-89 MM HG: CPT | Performed by: NURSE PRACTITIONER

## 2021-03-11 PROCEDURE — 3008F BODY MASS INDEX DOCD: CPT | Performed by: NURSE PRACTITIONER

## 2021-03-11 PROCEDURE — 1036F TOBACCO NON-USER: CPT | Performed by: NURSE PRACTITIONER

## 2021-03-11 NOTE — PROGRESS NOTES
Patient ID: Haresh Grace is a 38 y o  female  Plan:      Personal history of covid-19  Presumed COVID infection in late December/early January 2021  Regular sinus bradycardia  Patient reported drop in heart rate to the high 40s (by Apple watch)  48 hour Holter monitor      Essential hypertension   Blood pressure well controlled   Continue Toprol    Chest pain  Chest pain after exertion, onset after COVID infection  EKG without ischemic changes   Echo ordered       Follow up Plan/Summary Comments:  Nima Barros is unfortunately experiencing a lot of symptoms post COVID infection  Thus far, lab work an EKG have been within limits  Given the sudden alterations in her heart rate, I will arrange for a 40 out our Holter monitor  I will also arrange an echocardiogram     Follow-up will be determined after review of above noted studies  HPI:     I had the pleasure of meeting Nima Barros in the office today at the request of Margo Ortiz  Nima Barros is a pleasant 76-year-old female presenting with multiple concerns  She is accompanied by her , Zi Singh  She reports that her whole family was diagnosed with COVID shortly after Jeff  She was presumed to have a COVID infection after she became symptomatic and her  tested positive  Since her COVID infection, she states that she is very short of breath with minimal activity, requiring rest upon getting to the top of her steps  She feels that her heart is sometimes racing and other times slow  She reports that her heart rate will be elevated in the 120s and then suddenly dropped to the high 40s  This is noted by observing reports on her Apple watch  She notes that she has episodes where she experiences a sudden onset chest pain that moves into her left arm, increased thirst, fatigue, dizziness and lightheadedness  These episodes all occur after minimal activity such as standing for a period of time or walking the steps    She feels that she needs a nap to recover  Prior to her COVID infection, she was walking and jogging on a treadmill and very active  She denies any alcohol or tobacco use    Review of Systems   10  point ROS  was otherwise non pertinent or negative except as per HPI or as below  Gait: Normal    Most recent or relevant cardiac/vascular testing:    EKG 3/3/2021 NSR      Objective:     /82   Pulse 68   Ht 5' 3" (1 6 m)   Wt 90 7 kg (199 lb 15 3 oz)   BMI 35 42 kg/m²     PHYSICAL EXAM:    General:  Normal appearance, no acute distress  Eyes:  Anicteric  Oral mucosa:   Wearing a mask  Neck:  No JVD  Carotid upstrokes are brisk without bruits  No masses  Chest:  Clear to auscultation   Cardiac:  No palpable PMI  Normal S1 and S2  No murmur gallop or rub  Abdomen:  Soft and nontender  No palpable organomegaly or aortic enlargement  Extremities:  No peripheral edema  Musculoskeletal:  Symmetric  Vascular:  Pedal pulses are intact  Neuro:  Grossly symmetric  Psych:  Alert and oriented x3      Allergies   Allergen Reactions    Nuts Anaphylaxis     Anaphylaxis    Amoxicillin Hives    Penicillins Hives    Sulfamethoxazole-Trimethoprim Hives       Current Outpatient Medications:     levothyroxine 100 mcg tablet, TAKE 1 TABLET (100 MCG TOTAL) BY MOUTH DAILY, Disp: 90 tablet, Rfl: 3    metoprolol succinate (TOPROL-XL) 50 mg 24 hr tablet, TAKE 1 TABLET BY MOUTH EVERY DAY, Disp: 90 tablet, Rfl: 3    Multiple Vitamins-Minerals (MULTIVITAL PO), Take 1 capsule by mouth daily  , Disp: , Rfl:   Past Medical History:   Diagnosis Date    COVID-19 2021    Disease of thyroid gland     Hypertension      Past Surgical History:   Procedure Laterality Date     SECTION      Twice    HYSTERECTOMY      Total        CMP:   Lab Results   Component Value Date    K 4 4 2021     2021    CO2 28 2021    BUN 17 2021    CREATININE 0 80 2021    EGFR 92 2021     Lipid Profile: Lab Results   Component Value Date    TRIG 87 10/02/2020    HDL 48 10/02/2020         Social History     Tobacco Use   Smoking Status Former Smoker    Types: Cigarettes    Quit date:     Years since quittin 2   Smokeless Tobacco Never Used

## 2021-03-15 ENCOUNTER — HOSPITAL ENCOUNTER (OUTPATIENT)
Dept: NON INVASIVE DIAGNOSTICS | Facility: CLINIC | Age: 41
Discharge: HOME | End: 2021-03-15
Payer: COMMERCIAL

## 2021-03-15 DIAGNOSIS — R00.1: ICD-10-CM

## 2021-03-15 PROCEDURE — 93225 XTRNL ECG REC<48 HRS REC: CPT

## 2021-03-15 PROCEDURE — 93226 XTRNL ECG REC<48 HR SCAN A/R: CPT

## 2021-03-23 PROCEDURE — 93227 XTRNL ECG REC<48 HR R&I: CPT | Performed by: INTERNAL MEDICINE

## 2021-03-24 ENCOUNTER — PATIENT MESSAGE (OUTPATIENT)
Dept: INTERNAL MEDICINE CLINIC | Facility: CLINIC | Age: 41
End: 2021-03-24

## 2021-03-24 DIAGNOSIS — U07.1 COVID-19 VIRUS INFECTION: Primary | ICD-10-CM

## 2021-03-24 DIAGNOSIS — U07.1 COVID-19 VIRUS DETECTED: Primary | ICD-10-CM

## 2021-03-24 RX ORDER — ALPRAZOLAM 0.25 MG/1
0.25 TABLET ORAL 2 TIMES DAILY PRN
Qty: 60 TABLET | Refills: 0 | Status: SHIPPED | OUTPATIENT
Start: 2021-03-24 | End: 2021-06-03 | Stop reason: SDUPTHER

## 2021-03-30 ENCOUNTER — APPOINTMENT (OUTPATIENT)
Dept: RADIOLOGY | Facility: CLINIC | Age: 41
End: 2021-03-30
Payer: COMMERCIAL

## 2021-03-30 DIAGNOSIS — U07.1 COVID-19 VIRUS DETECTED: ICD-10-CM

## 2021-03-30 PROCEDURE — 71046 X-RAY EXAM CHEST 2 VIEWS: CPT

## 2021-04-01 ENCOUNTER — EVALUATION (OUTPATIENT)
Dept: OCCUPATIONAL THERAPY | Facility: CLINIC | Age: 41
End: 2021-04-01
Payer: COMMERCIAL

## 2021-04-01 ENCOUNTER — TELEPHONE (OUTPATIENT)
Dept: INTERNAL MEDICINE CLINIC | Facility: CLINIC | Age: 41
End: 2021-04-01

## 2021-04-01 DIAGNOSIS — U07.1 COVID-19 VIRUS DETECTED: Primary | ICD-10-CM

## 2021-04-01 PROCEDURE — 97166 OT EVAL MOD COMPLEX 45 MIN: CPT

## 2021-04-01 PROCEDURE — 97110 THERAPEUTIC EXERCISES: CPT

## 2021-04-01 NOTE — PROGRESS NOTES
OT Evaluation     Today's date: 2021  Patient name: Caitlin Lara  : 1980  MRN: [id number]  Referring provider: GABRIELA Shaw  Dx:   Encounter Diagnosis     ICD-10-CM    1  COVID-19 virus detected  U07 1 Ambulatory referral to Occupational Therapy                  Assessment  Assessment details: Patient presenting to OP OT services with a dx of post COVID-19  Patient reports initially begin dx with COVID-19 on 2020  Patient experienced fevers and coughing for 11 days  Patient was not hospitalized  Patient reports entire family experienced COVID symptoms  Patient reports coughing began to improve, but continued to exerpience endurance deficits  Patient reports difficulty with navigating stairs due to fatigue  Patient reports difficulty with daily ADLs and morning routines secondary to fatigue as well  Patient reports exhaustion varies based on intensity of exercises  Patient reports in general she begins to experience fatigue after thirty minutes of light activity  Patient is currently working as a  for a pharmacy  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 in which she was reporting an increased in fatigue during work duties  Patient also noted "brain fog" when completing job tasks on a computer  Patient had chest x-ray two days ago and was told she had a "sluggish" lung  Patient recent saw Cardiology and had a Holter monitor placed two weeks ago for 2 days  Patient has an EKG at the beginning of march  Patient had recent blood work completed without any abnormalities  Patient has an Echo scheduled for 2021  Patient will follow-up with Cardiology after Echo  Patient has follow-up in  PCP  Patient is right hand dominant  Patient stated the symptoms she experienced due to COVID included chest pain with radiating sensations into left arm, fever, coughing, loss of smell and taste   Patient reports occasional instance where taste is different, such as coffee at this time  Patient reports coughing improved since the end of January  Pt is now being referred to for post Covid evaulation  As assessed, patient will benefit from PT and SLP evaluation  spO2 99% 41 HR  Patient presenting with WNL breathing patterns during evaluation  When patient attempted light cardio activity, breathing patterns increased with nasal flaring and labored breathing  Patient also noted lightheadedness during activity  O2 saturations did not change with change in position  spO2 decreased to 91% with activity  migraines ocular  Last two weeks ago  Impairments: abnormal coordination, abnormal or restricted ROM and impaired physical strength  Other impairment: decreased functional use  Barriers to therapy: Past Medical History:  01/2021: COVID-19  No date: Disease of thyroid gland  No date: Hypertension  Understanding of Dx/Px/POC: good   Prognosis: good    Goals  STGs    Pt will increase  strength by 5-10#  Pt will increase shoulder strength by 1/2 strength grade    Pt will demonstrate improved activity tolerance to 10 minutes without rest breaks or fatigue    Pt will demonstrate consistent spO2 during session of 90% and above    Independent with HEP      LTGs     Pt will increase  strength by an additional 5-10#  Pt will increase shoulder strength by 1-2 strength grade  Pt will demonstrate improved activity tolerance to 20 minutes without rest breaks or fatigue    Pt will report an increase in ADL/IADL participation          Plan  Plan details: Patient has presenting to OP OT services with a dx of COVID-19  Patient demonstrating increased pain, decreased strength, decreased ROM and decreased activity  Pt would benefit from continued Occupational Therapy services one to two times per week for 4 weeks to return to prior level of function and achieve all established goals  Thank you for the referral! Patient is limited with attendance secondary to work schedule     Patient would benefit from: OT eval and skilled occupational therapy  Referral necessary: Yes  Planned therapy interventions: massage, manual therapy, joint mobilization, patient education, strengthening, stretching, fine motor coordination training, home exercise program, neuromuscular re-education, self care, therapeutic exercise, therapeutic activities, IADL retraining, ADL retraining, breathing training, coordination, graded activity and graded exercise  Frequency: 2x week  Duration in visits: 8  Duration in weeks: 4  Treatment plan discussed with: patient        Subjective Evaluation    History of Present Illness  Mechanism of injury: COVID-19          Not a recurrent problem   Quality of life: good    Pain  Current pain ratin  At best pain ratin  At worst pain ratin  Location: Chest Pain  Quality: radiating and sharp  Relieving factors: rest  Aggravating factors: overhead activity, walking, stair climbing and lifting    Social Support  Steps to enter house: yes  Stairs in house: yes (3 JUHI with HR)   3  Lives in: multiple-level home  Lives with: spouse, parents and significant other    Employment status: working  Hand dominance: right  Exercise comments: Oct Treadmil  Diagnostic Tests  X-ray: abnormal  Treatments  Current treatment: occupational therapy  Patient Goals  Patient goals for therapy: decreased pain, return to sport/leisure activities, independence with ADLs/IADLs, increased strength and increased motion  Patient goal: Increase endurance        Objective     Neurological Testing     Sensation     Shoulder   Left Shoulder   Diminished: light touch    Right Shoulder   Intact: light touch    Wrist/Hand   Left   Diminished: light touch    Right   Intact: light touch    Additional Neurological Details  Patient reports radiating sensation deficits into left hand  Patient reports increased with activity       Active Range of Motion   Left Shoulder   Normal active range of motion    Right Shoulder Normal active range of motion    Left Elbow   Normal active range of motion    Right Elbow   Normal active range of motion    Left Wrist   Normal active range of motion    Right Wrist   Normal active range of motion    Left Thumb   Opposition: WNL    Right Thumb   Opposition: WNL    Additional Active Range of Motion Details  Composite fist    Strength/Myotome Testing     Left Shoulder     Planes of Motion   Flexion: 3+   Extension: 4-   Abduction: 3+   Adduction: 4-     Right Shoulder     Planes of Motion   Flexion: 3+   Extension: 4-   Abduction: 3+   Adduction: 4-     Left Elbow   Flexion: 4-  Extension: 4-  Forearm supination: 4-  Forearm pronation: 4-    Right Elbow   Flexion: 4-  Extension: 4-  Forearm supination: 4-  Forearm pronation: 4-    Left Wrist/Hand   Wrist extension: 4-  Wrist flexion: 4-  Radial deviation: 4-  Ulnar deviation: 4-     (2nd hand position)     Trial 1: 55    Right Wrist/Hand   Wrist extension: 4-  Wrist flexion: 4-  Radial deviation: 4-  Ulnar deviation: 4-     (2nd hand position)     Trial 1: 52    Additional Strength Details  Radiating pain with MMT testing  Neuro Exam:     Coordination   Heel to shin: left WNL and right WNL  Finger to nose: left WNL and right WNL  Rapid alternating movements: UE WNL and LE impaired     Functional outcomes   Left 9 peg hole test: 23 45 (seconds)  Right 9 hole peg test: 21 36 (seconds)  Functional outcome comment: Decreased FMC as compared to right hand                Precautions s/p COVID-19       Manuals 04/01/2021                                       Neuro Re-Ed  04/01/2021                                                               Ther Ex 04/01/2021       Thera-Band  Sh Ext  Triceps Ext  Retraction  Biceps Curl        Digi-Flex        UBE Machine        Nu Step 1:49 w/ Fatigue  91% HR 71  2:45 to Recover  99%       Thera-Ball  Forward Flex  Rotation  Diagonal                                Ther Activity 04/01/2021       Grooved Peg Board        Tension Pin Pom Pom        Purdue Peg Board        Brain Box w/ FM Activity        Laundry Management        Dynavision  A  A Endurance  B                Modalities 04/01/2021

## 2021-04-06 ENCOUNTER — OFFICE VISIT (OUTPATIENT)
Dept: OCCUPATIONAL THERAPY | Facility: CLINIC | Age: 41
End: 2021-04-06
Payer: COMMERCIAL

## 2021-04-06 DIAGNOSIS — U07.1 COVID-19 VIRUS DETECTED: Primary | ICD-10-CM

## 2021-04-06 PROCEDURE — 97110 THERAPEUTIC EXERCISES: CPT

## 2021-04-06 PROCEDURE — 97530 THERAPEUTIC ACTIVITIES: CPT

## 2021-04-06 NOTE — PROGRESS NOTES
Daily Note     Today's date: 2021  Patient name: Caitlin Lara  : 1980  MRN: [id number]  Referring provider: GABRIELA Shaw  Dx:   Encounter Diagnosis     ICD-10-CM    1  COVID-19 virus detected  U07 1                   Subjective: "I get a rush to the back of my head "      Objective: See treatment diary below      Assessment: Tolerated treatment well  Patient exhibited good technique with therapeutic exercises and would benefit from continued OT  Patient reports an increase  in dizziness during activity  Patient's endurance decreased over the duration of the session with increased rest breaks noted  Patients rest breaks varied from 1 minute to 3 minutes  Patient reports a "flooding feeling" to the back of her head during exercise  Therapist will make increases as tolerated  Plan: Continue per plan of care  Progress treatment as tolerated            Precautions s/p COVID-19       Vitals 2021      Pre Treatment  110/70 BP  98%  64 HR      During Treatment  112/78 BP  98%  62 HR      Post Treatment  112/72 BP  99%  68 HR              Neuro Re-Ed  2021                                                              Ther Ex 2021      Thera-Band  Sh Ext    Triceps Ext    Retraction  Biceps Curl    Yellow  X 6 99% HR 67  X 5 99% HR 64  X 5 99% HR 63  X 5 99% HR 65    X 5 98% HR 64  X 5 99% HR 67      Digi-Flex  Red x 20      UBE Machine  1:00 w/ Fatigue  98% HR 74  1:28 Recover  1:00 w/ Fatigue  99% HR 68  3:30 Recover  0:35 w/ Fatigue  99% HR 69      Nu Step 1:49 w/ Fatigue  91% HR 71  2:45 to Recover  99%       Thera-Ball  Forward Flex  Rotation  Diagonal  Yellow  X 20                              Ther Activity 2021      Grooved Peg Board  R Hand 1:37  L Hand 1:34      Tension Pin Pom Pom        Purdue Peg Board        Brain Box w/ FM Activity  Theo while completing Tension Pin Pom Pom  6/8      Laundry Management [place]  A  A Endurance  B                Modalities 04/01/2021 04/06/2021

## 2021-04-07 ENCOUNTER — HOSPITAL ENCOUNTER (OUTPATIENT)
Dept: NON INVASIVE DIAGNOSTICS | Facility: CLINIC | Age: 41
Discharge: HOME | End: 2021-04-07
Payer: COMMERCIAL

## 2021-04-07 DIAGNOSIS — R07.9 CHEST PAIN, UNSPECIFIED TYPE: ICD-10-CM

## 2021-04-07 PROBLEM — J02.9 SORE THROAT: Status: RESOLVED | Noted: 2017-04-15 | Resolved: 2021-04-07

## 2021-04-07 PROCEDURE — 93306 TTE W/DOPPLER COMPLETE: CPT | Performed by: INTERNAL MEDICINE

## 2021-04-07 PROCEDURE — 93306 TTE W/DOPPLER COMPLETE: CPT

## 2021-04-08 ENCOUNTER — TREATMENT (OUTPATIENT)
Dept: INTERNAL MEDICINE CLINIC | Facility: CLINIC | Age: 41
End: 2021-04-08

## 2021-04-08 NOTE — PROGRESS NOTES
PT Evaluation     Today's date: 2021  Patient name: Ewelina Moser  : 1980  MRN: [id number]  Referring provider: GABRIELA Mckeon  Dx:   Encounter Diagnosis     ICD-10-CM    1  COVID-19 virus detected  U07 1    2  Weakness of both lower extremities  R29 898    3  Decreased functional mobility and endurance  Z74 09                   Assessment  Assessment details: Patient is a 38y o  year old female presenting to OPPT s/p diagnosis of covid-19 virus  Now presents with long hauler symptoms  Patient demonstrates decreased strength, endurance, high level balance, and functional independence  Patient is unable to return to normal work schedule at this time and requires assistance for ADLs and IADLs with increased periods of fatigue  She will benefit from skilled PT interventions to maximize return to PLOF and independence as able  Thank you for this referral and the ability to participate in the care of this patient  Impairments: abnormal coordination, abnormal gait, abnormal movement, activity intolerance, impaired balance, impaired physical strength, lacks appropriate home exercise program, safety issue and weight-bearing intolerance  Understanding of Dx/Px/POC: good   Prognosis: good  Prognosis details: Positive prognostic indicators include positive attitude toward recovery and good understanding of diagnosis and treatment plan options  Negative prognostic indicators include chronicity of symptoms and co-morbidities  Goals  In 4 weeks, patient will:  1  Demonstrate ability to perform 30 minutes of activity without requiring seated rest break  2   Demonstrate ability to maintain upright balance unsupported by UEs while reaching above shoulder height without resistance to promote stability with ADLs  3  Demonstrate ability to lift up to 15 lbs from  knees to waist unsupported by UEs to promote stability with ADLs    4   Demonstrate increased strength of bilateral LEs to allow for improved transfer quality and stability    In 4-10 weeks, patient will:  1  Demonstrate reduced fall risk based on outcome measures  2  Demonstrate consistent carryover with HEP  3  Return to community ambulation with least restrictive AD for at least 500+ feet      Plan  Patient would benefit from: skilled physical therapy  Other planned modality interventions: Modalities prn for symptom management  Planned therapy interventions: manual therapy, neuromuscular re-education, therapeutic activities, therapeutic exercise, gait training and home exercise program  Frequency: 2x week  Duration in visits: 8  Duration in weeks: 4  Plan of Care beginning date: 4/9/2021  Plan of Care expiration date: 5/9/2021  Treatment plan discussed with: patient and PTA        Subjective Evaluation    History of Present Illness  Mechanism of injury: Per EMR:  Patient with diagnosed COVID-19 in January of 2021  Occasional low HR while exercising (HR was appropriate while walking on the treadmill - 's; however, dropped to 48 and patient had subsequent chest discomfort over the left pectoral muscle, radiating to left shoulder and down left arm to her hand, this made her tired and she took a 2 hour nap  Positive for shortness of breath, chest discomfort, intermittent dizziness, blurred vision/fogginess, achy joints, migraines, and red rash on her face intermittently  Of note:  Limited endurance, limited ambulation (1-2 minutes at times), decreasd driving tolerance (30 mins), attention to task (20 mins), dizziness/balance deficits  Sensation dizziness with low heart rate, clammy  Migraines  SOB and chest pain with negotiation of stairs  Burning with increased activity    Quality of life: fair    Pain  Location: Joint pain with activity  Aggravating factors: walking, standing, stair climbing and lifting  Progression: no change    Social Support    Employment status: working    Diagnostic Tests    FCE comments: CHEST    INDICATION:  U07 1: COVID-19      COMPARISON:  None     EXAM PERFORMED/VIEWS:  XR CHEST PA & LATERAL     FINDINGS:     Cardiomediastinal silhouette appears unremarkable      Tiny focus of linear atelectasis or scarring left costophrenic angle  The lungs are otherwise clear        No pneumothorax or pleural effusion      Osseous structures appear within normal limits for patient age      IMPRESSION:     No acute cardiopulmonary disease  Treatments  Previous treatment: occupational therapy  Current treatment: physical therapy  Patient Goals  Patient goals for therapy: increased strength, independence with ADLs/IADLs, return to sport/leisure activities, return to work and decreased pain          Objective     Yellow flag question:   Were you in the ICU? - NO  Were you on a ventilator? - NO    Do you feel that your voice has changed? Are you having difficulty with swallowing?- NO    Difficulty with participation in ADL and IADL - YES    Breathing Difficulty? -   Has pulmonary function test been completed? - NO    Patient Specific Functional Scale score: completed with FOTO         Objective:      Vitals:   140/95 , 68, 98%    Functional Outcome Measures:    6 minute walk test: - Unable to complete,     Balance :  5x Sit to stand:  18 3 sec  FGA  Score:  11/30   Seated rest - HR 66 bpm; 98% on RA         Additional Screening Based on Yellow flag or if determined appropriate:   PHQ-9:  See flowsheet, medication in place       Te WOODS Protocol:  Recommended Intensity Dyspnea Scale: 3-5 (moderate to heavy)   Rate of perceived exertion:3-4/10 (moderate to heavy)      Unable to perform at this time, tolerated total 3 mins on upright bike - unable to maintain pace to keep monitor on bike (<35 rpms)    Minutes Incline/LVL RPE Dyspnea Heart Rate PO2 BP   1 min 0%        2 min 1%            Breathing evaluation:     Describe breathing: via: room air  Breathing pattern: WNL  Capillary refill: 2 seconds  Clubbing: absent   Nasal flaring: absent       02 saturation:   Baseline - 71 bpm, 99% at rest on RA  1 min - 80 bpm, 98% on RA  2 mins - 76 bpm, 98% on RA  3 mins - 74 bpm, 98% on RA  - RPE of 8/10, dizzy    Rib cage expansion/Chest wall mobility    Supine normal breathe (tidal)/supine deep breathe(vital capacity) Seated normal breathe (tidal)/supine deep breathe (vital capacity) Standing normal breathe (tidal)/supine deep breathe (vital capacity)   Axilla Age appropriate Age appropriate Age appropriate   xiphoid Age appropriate Age appropriate Age appropriate   Half way between xiphoid and umbilicus Age appropriate Age appropriate Age appropriate         Is chest wall expansion symmetrical? Y       Precautions: falls, monitor vitals  Re-eval Date: 5/7/2021    Date 4/8       Visit Count 1       FOTO See IE       Pain In See IE       Pain Out See IE           Manuals         prn                               Neuro Re-Ed        Star Drill        Natus Foam v Bosu       Core stability Seated  Disc         Multidirectional board                                Ther Ex 140s/90s  HR 70s-90s  SpO2 -        Aerobic        SLR x 4        HR/TR        Mini Squats        Knee flex/ext mach        Leg Press                        Ther Activity                        Gait Training                        Modalities

## 2021-04-09 ENCOUNTER — EVALUATION (OUTPATIENT)
Dept: PHYSICAL THERAPY | Facility: CLINIC | Age: 41
End: 2021-04-09
Payer: COMMERCIAL

## 2021-04-09 DIAGNOSIS — U07.1 COVID-19 VIRUS DETECTED: Primary | ICD-10-CM

## 2021-04-09 DIAGNOSIS — R29.898 WEAKNESS OF BOTH LOWER EXTREMITIES: ICD-10-CM

## 2021-04-09 DIAGNOSIS — Z74.09 DECREASED FUNCTIONAL MOBILITY AND ENDURANCE: ICD-10-CM

## 2021-04-09 PROCEDURE — 97110 THERAPEUTIC EXERCISES: CPT | Performed by: PHYSICAL THERAPIST

## 2021-04-09 PROCEDURE — 97163 PT EVAL HIGH COMPLEX 45 MIN: CPT | Performed by: PHYSICAL THERAPIST

## 2021-04-09 NOTE — PROGRESS NOTES
Daily Note     Today's date: 2021  Patient name: Caitlin Lara  : 1980  MRN: [id number]  Referring provider: GABRIELA Shaw  Dx:   Encounter Diagnosis     ICD-10-CM    1  COVID-19 virus detected  U07 1    2  Weakness of both lower extremities  R29 898    3  Decreased functional mobility and endurance  Z74 09                   Subjective: I am doing ok  Went camping this weekend      Objective: See treatment diary below      Assessment: Tolerated treatment well  Pt requires additonal time to complete TE with 2 min seated rest offered VSS t/o rx session   Patient would benefit from continued PT      Plan: Continue per plan of care        Precautions: falls, monitor vitals  Re-eval Date: 2021    Date       Visit Count 1 2      FOTO See IE       Pain In See IE       Pain Out See IE           Manuals         prn                               Neuro Re-Ed        Star Drill        Natus Foam v Bosu       Core stability Seated  Disc   Seated/ disc  AH 2x10 5"  ADD 2x10 5"  ABD 2x10 5"  Red  2 min rest      Multidirectional board                                Ther Ex 140s/90s  HR 70s-90s  SpO2 -  HR 70-90s  SPO2      Aerobic  Nustep  Seated rest between timed effort  2x 1 min  3x 2 min  SPM <40  VSS      SLR x 4  BL  Stand/firm  1x10 flex/ABD      HR/TR  BL stand/firm  10x  2 HHA      Mini Squats        Knee flex/ext mach        Leg Press                        Ther Activity                        Gait Training                        Modalities

## 2021-04-09 NOTE — PATIENT INSTRUCTIONS
Access Code: [email or web address]  URL: [email or web address]  Date: 04/09/2021  Prepared by: Kaylen Bhakta    Exercises  Seated Cervical Sidebending Stretch - 1 x daily - 7 x weekly - 1 sets - 4 reps - 30 sec hold  Seated Upper Thoracic Stretch - 1 x daily - 7 x weekly - 5 sets - 5 reps - 10 sec hold  Supine Lower Trunk Rotation - 1 x daily - 7 x weekly - 5 sets - 5 reps - 10 sec hold

## 2021-04-12 ENCOUNTER — OFFICE VISIT (OUTPATIENT)
Dept: OCCUPATIONAL THERAPY | Facility: CLINIC | Age: 41
End: 2021-04-12
Payer: COMMERCIAL

## 2021-04-12 ENCOUNTER — OFFICE VISIT (OUTPATIENT)
Dept: PHYSICAL THERAPY | Facility: CLINIC | Age: 41
End: 2021-04-12
Payer: COMMERCIAL

## 2021-04-12 DIAGNOSIS — R29.898 WEAKNESS OF BOTH LOWER EXTREMITIES: ICD-10-CM

## 2021-04-12 DIAGNOSIS — U07.1 COVID-19 VIRUS DETECTED: Primary | ICD-10-CM

## 2021-04-12 DIAGNOSIS — Z74.09 DECREASED FUNCTIONAL MOBILITY AND ENDURANCE: ICD-10-CM

## 2021-04-12 PROCEDURE — 97530 THERAPEUTIC ACTIVITIES: CPT

## 2021-04-12 PROCEDURE — 97110 THERAPEUTIC EXERCISES: CPT

## 2021-04-12 NOTE — PATIENT INSTRUCTIONS
Access Code: [email or web address]  URL: [email or web address]  Date: 04/12/2021  Prepared by: Harmeet Knowles    Exercises  Standing Heel Raise - 1 x daily - 7 x weekly - 10 reps - 3 sets  Heel Toe Raises with Counter Support - 1 x daily - 7 x weekly - 10 reps - 3 sets  Standing Hip Flexion AROM - 1 x daily - 7 x weekly - 10 reps - 3 sets  Standing Hip Abduction - 1 x daily - 7 x weekly - 10 reps - 3 sets

## 2021-04-12 NOTE — PROGRESS NOTES
Daily Note     Today's date: 2021  Patient name: Mya Hudson  : 1980  MRN: [id number]  Referring provider: GABRIELA Frausto  Dx:   Encounter Diagnosis     ICD-10-CM    1  COVID-19 virus detected  U07 1                   Subjective: I was not as tired as I thought I would be after PT  Objective: See treatment diary below      Assessment: Tolerated treatment well  Patient exhibited good technique with therapeutic exercises and would benefit from continued OT Pt participated in skilled OT this date focusing on UE strength/endurance, activity tolerance/endurance, FMC/FMS, and GMC/GMS, for increased participation in ADL/IADL activities  Therapist noting increased breathing rate after completing pole reaching at the top in comparison to no increased breathing rate post UBE  Pt completd peg/pom activity with no breaks required using b/l hands  Therapist providing verbal cues for attention to breath and encouraging pursed lip breathing as needed, and encouraging proper breaks throughout  Plan: Continue per plan of care  Progress treatment as tolerated  Progress treament per protocol        Vitals 2021       Pre Treatment   110/70 BP  98%  64 HR SpO2 - 99%  HR 67       During Treatment   112/78 BP  98%  62 HR SpO2 - 98%  HR - 77       Post Treatment   112/72 BP  99%  68 HR SpO2 - 98%  HR 67                     Neuro Re-Ed  2021                                                                                                         Ther Ex 2021       Thera-Band  Sh Ext     Triceps Ext     Retraction  Biceps Curl      Yellow  X 6 99% HR 67  X 5 99% HR 64  X 5 99% HR 63  X 5 99% HR 65     X 5 98% HR 64  X 5 99% HR 67        Digi-Flex   Red x 20        UBE Machine   1:00 w/ Fatigue  98% HR 74  1:28 Recover  1:00 w/ Fatigue  99% HR 68  3:30 Recover  0:35 w/ Fatigue  99% HR 69 1:30 then 1 5 min break, then completed second 1:30  - HR 78 and SpO2 at 99%, no increased breathing noted       Nu Step 1:49 w/ Fatigue  91% HR 71  2:45 to Recover  99%           Thera-Ball  Forward Flex  Rotation  Diagonal   Yellow  X 20                                                   Ther Activity 04/01/2021 04/06/2021 4/12/2021       Grooved Peg Board   R Hand 1:37  L Hand 1:34        Tension Pin Pom Pom             [employer]urdue Peg Board             Brain Box w/ FM Activity   [facility] while completing Tension Pin Pom Pom  6/8        Laundry Management             Dynavision  A  A Endurance  B             Tension Pin on Pole   All Pins on/off pole w/ increased time x 3 breaks needed     Large Peg Board   Pt completed large peg board using b/l hands lacing poms on top with green tension pin                                           Modalities 04/01/2021 04/06/2021

## 2021-04-13 DIAGNOSIS — Z23 ENCOUNTER FOR IMMUNIZATION: ICD-10-CM

## 2021-04-14 ENCOUNTER — EVALUATION (OUTPATIENT)
Dept: SPEECH THERAPY | Facility: CLINIC | Age: 41
End: 2021-04-14
Payer: COMMERCIAL

## 2021-04-14 DIAGNOSIS — R41.841 COGNITIVE COMMUNICATION DEFICIT: Primary | ICD-10-CM

## 2021-04-14 DIAGNOSIS — U07.1 COVID-19 VIRUS DETECTED: ICD-10-CM

## 2021-04-14 PROCEDURE — 96125 COGNITIVE TEST BY HC PRO: CPT | Performed by: NURSE PRACTITIONER

## 2021-04-14 NOTE — PROGRESS NOTES
Speech Language Pathology Cognitive-Linguistic Evaluation      Today's date: 2021  Patients name: Nicky Suarez  : 1980  MRN: [id number]  Safety measures: patient appears safe  Referring provider: GABRIELA Ashraf    Flowsheet:  Start Time:   Stop Time:   Total time in clinic (min): 60 minutes        Subjective Comments: Patient diagnosed with COVID-19 in 2020  Reports continued difficulty with chest pain, physical endurance, fatigue requiring naps throughout the day, SOB, short term memory difficulty, attention difficulty, word finding difficulty, changes in handwriting and written language  Reports these cognitive changes are affecting her activities of daily living as well as her ability to perform at work  Patient reports seen by cardiology s/p full work up "normal"  CXR 3/30 "Cardiomediastinal silhouette appears unremarkable  Tiny focus of linear atelectasis or scarring left costophrenic angle  The lungs are otherwise clear  No pneumothorax or pleural effusion  Osseous structures appear within normal limits for patient age    IMPRESSION:No acute cardiopulmonary disease "    Patient goal: to return to my normal life again  Reason for Referral:Change in cognitive status  Prior Functional Status:Communication appropriate and efficient in most situations  Minimal difficulty with self-monitoring, self-correction needed     Hearing:Within Normal limits  Vision:Other wears glasses, initially had blurriness  Still varies at times  Pt will be making an appointment  Medication List:   No current outpatient prescriptions on file  No current facility-administered medications for this visit  Allergies: No Known Allergies  Primary Language: English  Preferred Language: English ( primary language)   Fluent Western Neisha, Divehi, Jad     Home Environment/ Lifestyle: live home with 2 daughters,  and mother in law     Current Education status:   Occupation:Check processor, currently working   Current / Prior Services being received: currently receiving PT/OT/ SP    Mental Status: Alert  Behavior Status:Cooperative  Communication Modalities: Verbal  Recent Speech/ Language therapy:None  Rehabilitation Prognosis:Good rehab potential to reach the established goals     Difficulty processing information, short term memory, repeating self and not fully aware, difficulty finding words,  fatigue very easily, making errors at work, sustained attention  ,     Assessments:Cognitive Assessment  Highest Level of Education:Bachelor/Associate's degree in   CONCUSSION COGNITIVE CHECKLIST:  *Patient indicated that she is experiencing the following symptoms:    · Memory: Remembering what people have told you, Remembering peoples' names, Learning new things, Remembering the date/day of the week and Keeping track of your appointments    · Attention: Easily distracted, Keeping your attention/concentrating on a task, Dividing your attention (i e , multi-tasking) and Losing your train of thought    · Processing: Processing new information , Following directions and Responding to questions in a timely manner     · Executive Functions: Organizing your thoughts, Planning daily tasks, Self-correcting or recognizing mistakes, Initiating/starting tasks and Sequencing activities (such as cooking or following a recipe)    · Communication: Word finding in conversation, Expressing thoughts and ideas fluently and Expressing thoughts and ideas into writing    · Visual: Losing spot on the page when reading, Poor screen tolerance with electronics, Change in handwriting (i e , sloppier) and Eye strain/fatigue when reading Also experiencing dizziness  · Emotional: Increased anxiety, Easily agitated or irritable and Sleep changes     Increased Sensitivities to: Lighting, feels "sensory overload"  Taste has not returned back to normal yet  Smell: still getting false smell signals  Standardized Testing  The Repeatable Battery for the Assessment of Neuropsychological Status (RBANS) is a brief, individually-administered assessment which measures attention, language, visuospatial/constructional abilities, and immediate & delayed memory  The RBANS is intended for use with adolescents to adults, ages 15 to 80 years  The following results were obtained during the administration of the assessment  Form: D    Cognitive Domain/Subtest: Index Score: Percentile Rank: Classification:   IMMEDIATE MEMORY 103 58%ile Average        1  List Learning (28/40)        2  Story Memory (19/24)       VISUOSPATIAL/  CONSTRUCTIONAL 116 86%ile High Average        3  Figure Copy (20/20)        4  Line Orientation (18/20)       LANGUAGE 95 37%ile Average        5  Picture Naming (10/10)        6  Semantic Fluency (19/40)       ATTENTION 72 3%ile Borderline        7  Digit Span (10/16)        8  Coding (23/89)       DELAYED MEMORY 95 37%ile Average        9  List Recall (4/10)        10  List Recognition (20/20)        11  Story Recall (9/12)        12  Figure Recall (13/20)         Sum of Index Scores:  481   Total Score:  94   Percentile: 37%ile   Classification: Average           Goals  Short Term Goals: 1  Pt will complete mental manipulation tasks of up to 4 words and 4 numbers @ 80% accuracy min-mod cue  2 Patient will complete deductive reasoning problems with increased complexity and decreased prompt from min cue to independently @ 100% acc over 4 weeks  3 Pt will answer "wh" questions in response to paragraphs read to self and read outloud by clinician @ 80% ketan  4 Pt will complete high level multi-tier deductive reasoning puzzles with no more than 2 errors per puzzle  5 Patient will complete rapid naming tasks with familliar pictures increasing processing speed throughout  6 Patient will name atleast 10 items in an abstract category independently in less than 2 minutes     7 Patient will complete various sustained and divided attention tasks for 2+ minutes while attending to auditory/visual stimuli with task average decreasing closer to the average range  8 Pt will utilize atleast 2 word finding strategies in conversation in and out of session ketan  9 Pt will utilize atleast 2 memory strategies in structured 3 word, # or picture immediate memory recall tasks with carryover at home  Long Term Goals:  1  Pt will increase cognitive linguistic skills across all settings  2  Pt will increase physical and mental endurance across all settings  Impressions/Recommendations    Impressions: Patient performing in the average to high average range on all tasks on standardized testing except attention at borderline  Cognitive-linguistic therapy will be initiated at this time through rehabilitation as well as strategies to utilize at home  Patient reporting physical and cognitive symptoms are affecting her life tremendously  She may benefit from a course of therapy to determine if progress can be made       Recommendations:  -Patient would benefit from outpatient skilled Speech Therapy services : Cognitive-Linguistic therapy   Recommend neurology consult as well as optometry    -Frequency: 2x weekly  -Duration: 4-6 weeks    -Intervention certification from: 5/94/1444  -Intervention certification to: 8/15/55    -Intervention comments: puzzles, interactive metronome, memory/word finding strategies

## 2021-04-15 NOTE — PROGRESS NOTES
Daily Note     Today's date: 2021  Patient name: Edward Nelson  : 1980  MRN: [id number]  Referring provider: GABRIELA Sosa  Dx:   Encounter Diagnosis     ICD-10-CM    1  COVID-19 virus detected  U07 1    2  Weakness of both lower extremities  R29 898    3  Decreased functional mobility and endurance  Z74 09                   Subjective: I was wiped after last PT session  Closer to evening time I had elevated HR ~120 bpm, very SOB, afraid to move that it would jump more   Layed down  SPO2 was 94%  Lasted ~ 1 5 hours and went to bed        Objective: See treatment diary below      Assessment: Tolerated treatment fairly well  Fatigues easily with brief seated rest frequently t/o rx session  Pt Vitals monitor t/o rx session  SOB noted with Hip ext  Cues for proper breathing with therex  Patient would benefit from continued PT      Plan: Continue per plan of care          Precautions: falls, monitor vitals  Re-eval Date: 2021    Date      Visit Count 1 2 3     FOTO See IE       Pain In See IE       Pain Out See IE           Manuals         prn                               Neuro Re-Ed        Star Drill        Natus Foam v Bosu       Core stability Seated  Disc   Seated/ disc  AH 2x10 5"  ADD 2x10 5"  ABD 2x10 5"  Red  2 min rest Reviewed  Encourage   W/ HEP     Multidirectional board                                Ther Ex 140s/90s  HR 70s-90s  SpO2 -  HR 70-90s  SPO2 96-98% HR 66,77  SPO2  96-99%     Aerobic  Nustep  L1 w/o UE  Seated rest between timed effort  2x 1 min  3x 2 min  SPM <40  VSS Nustep  L1 w/o UE  2 min Seated rest between timed effort  3x 1 min  3x 2 min  SPM <40  VSS     SLR x 4  BL  Stand/firm  1x10 flex/ABD BL  Stand/firm  1 HHA  Cues / breath    2x10 flex  2 min seated rest    2x10 ABD  2 min seated rest    2x10 ext  2 min seated rest    VSS       HR/TR  BL stand/firm  10x  2 HHA BL stand/firm  2x10 ea   Flex  2 HHA  2 min seated rest     Mini Squats        Knee flex/ext mach        Leg Press                        Ther Activity                        Gait Training                        Modalities

## 2021-04-16 ENCOUNTER — OFFICE VISIT (OUTPATIENT)
Dept: OCCUPATIONAL THERAPY | Facility: CLINIC | Age: 41
End: 2021-04-16
Payer: COMMERCIAL

## 2021-04-16 ENCOUNTER — OFFICE VISIT (OUTPATIENT)
Dept: PHYSICAL THERAPY | Facility: CLINIC | Age: 41
End: 2021-04-16
Payer: COMMERCIAL

## 2021-04-16 DIAGNOSIS — U07.1 COVID-19 VIRUS DETECTED: Primary | ICD-10-CM

## 2021-04-16 DIAGNOSIS — Z74.09 DECREASED FUNCTIONAL MOBILITY AND ENDURANCE: ICD-10-CM

## 2021-04-16 DIAGNOSIS — R29.898 WEAKNESS OF BOTH LOWER EXTREMITIES: ICD-10-CM

## 2021-04-16 PROCEDURE — 97110 THERAPEUTIC EXERCISES: CPT

## 2021-04-16 PROCEDURE — 97530 THERAPEUTIC ACTIVITIES: CPT

## 2021-04-16 NOTE — PROGRESS NOTES
Daily Note     Today's date: 2021  Patient name: Ewelina Moser  : 1980  MRN: [id number]  Referring provider: GABRIELA Mckeon  Dx:   Encounter Diagnosis     ICD-10-CM    1  COVID-19 virus detected  U07 1                   Subjective: "I realized I didn't take a nap the other day "      Objective: See treatment diary below      Assessment: Tolerated treatment well  Patient exhibited good technique with therapeutic exercises and would benefit from continued OT  Patient tolerated increases in thera-band exercises  Patient completed each trial during Amna-vision activity with seated rest break in between  Patient reported resolution of flooding sensation in back of head during activity as compared to last session  She reports decrease frequency of naps      Plan: Continue per plan of care  Progress treatment as tolerated          Vitals 2021     Pre Treatment   110/70 BP  98%  64 HR SpO2 - 99%  HR 67       During Treatment   112/78 BP  98%  62 HR SpO2 - 98%  HR - 77  SpO2 - 97%  HR - 70     Post Treatment   112/72 BP  99%  68 HR SpO2 - 98%  HR 67  SpO2 - 99%  HR - 68                   Neuro Re-Ed  2021                                                                                                       Ther Ex 2021     Thera-Band  Sh Ext     Triceps Ext       Retraction           Biceps Curl      Yellow  X 6 99% HR 67  X 5 99% HR 64    X 5 99% HR 63  X 5 99% HR 65     X 5 98% HR 64  X 5 99% HR 67   Yellow   X 10 98% HR 71        X 10 99% HR 74        X 10 99%  HR 68       Digi-Flex   Red x 20   Red x 20     UBE Machine   1:00 w/ Fatigue  98% HR 74  1:28 Recover  1:00 w/ Fatigue  99% HR 68  3:30 Recover  0:35 w/ Fatigue  99% HR 69 1:30 then 1 5 min break, then completed second 1:30  - HR 78 and SpO2 at 99%, no increased breathing noted  1:30 99% SpO2 59 HR, break for 1:30, 1:15 99% SpO2 67 HR, break for 2:18, 1:30 98% SpO2 70 HR     Nu Step 1:49 w/ Fatigue  91% HR 71  2:45 to Recover  99%           Thera-Ball  Forward Flex  Rotation  Diagonal   Yellow  X 20   Yellow  X 20 99% 80HR                                               Ther Activity 04/01/2021 04/06/2021 4/12/2021 04/16/2021     Grooved Peg Board   R Hand 1:37  L Hand 1:34  R Hand 1:50  L Hand 1:50 3  In/out     Tension Pin Pom Pom             [employer] Peg Board             Brain Box w/ FM Activity   [facility] while completing Tension Pin Pom Pom  6/8  [facility] while completing Tension Pin Pom Pom   6/8     Laundry Management             Dynavision  A      A Endurance  B - 50% Green          26/2 21   98% SpO2  76 HR    15/15/2 02 R  15/15/1 89 G  99% SpO2  70 HR         Tension Pin on Pole   All Pins on/off pole w/ increased time x 3 breaks needed     Large Peg Board   Pt completed large peg board using b/l hands lacing poms on top with green tension pin                                           Modalities 04/01/2021 04/06/2021 04/12/2021 04/16/2021

## 2021-04-19 ENCOUNTER — OFFICE VISIT (OUTPATIENT)
Dept: SPEECH THERAPY | Facility: CLINIC | Age: 41
End: 2021-04-19
Payer: COMMERCIAL

## 2021-04-19 DIAGNOSIS — U07.1 COVID-19 VIRUS DETECTED: Primary | ICD-10-CM

## 2021-04-19 PROCEDURE — 92507 TX SP LANG VOICE COMM INDIV: CPT

## 2021-04-19 NOTE — PROGRESS NOTES
Daily Note     Today's date: 2021  Patient name: Alma Loco  : 1980  MRN: [id number]  Referring provider: GABRIELA Rios  Dx:   Encounter Diagnosis     ICD-10-CM    1  COVID-19 virus detected  U07 1    2  Weakness of both lower extremities  R29 898    3  Decreased functional mobility and endurance  Z74 09                   Subjective: Slowly improving  Still very tired  Able to fold laundry for the first, sitting  Objective: See treatment diary below      Assessment: Tolerated treatment fair  Patient demonstrated fatigue post treatment and would benefit from continued PT   LE endurance improving gradually  Still with asymmetry of gait  Plan: Continue per plan of care        Precautions: falls, monitor vitals  Re-eval Date: 2021    Date     Visit Count 1 2 3 4    FOTO See IE       Pain In See IE   Left flank    Pain Out See IE   Same        Manuals         prn                               Neuro Re-Ed        Star Drill        Natus Foam v Bosu       Core stability Seated  Disc   Seated/ disc  AH 2x10 5"  ADD 2x10 5"  ABD 2x10 5"  Red  2 min rest Reviewed  Encourage   W/ HEP Seated/ disc  AH 2x10 5"  ADD 2x10 5"  ABD 2x10 5"  isometric    Multidirectional board                                Ther Ex 140s/90s  HR 70s-90s  SpO2 -  HR 70-90s  SPO2 96-98% HR 66,77  SPO2  96-99% HR 70s  SpO2 % on RA    Aerobic  Nustep  L1 w/o UE  Seated rest between timed effort  2x 1 min  3x 2 min  SPM <40  VSS Nustep  L1 w/o UE  2 min Seated rest between timed effort  3x 1 min  3x 2 min  SPM <40  VSS Nustep  L3 w UE  1 min per trial x 7 with 3 min trial at L1  Seated rest between timed effort    RPE 2-3/10    SRB  AROM  1 min x 3  2 min x 2    RPE 2-3/10    SLR x 4  BL  Stand/firm  1x10 flex/ABD BL  Stand/firm  1 HHA  Cues / breath    2x10 flex  2 min seated rest    2x10 ABD  2 min seated rest    2x10 ext  2 min seated rest    VSS   BL  Stand/firm  1 HHA  Cues / breath    SLR x flex and ABd    RPE   2-3/10    HR/TR  BL stand/firm  10x  2 HHA BL stand/firm  2x10 ea   Flex  2 HHA  2 min seated rest BL stand/firm  2x10 ea   Flex  2 HHA    RPE 2-3/10    Mini Squats    BL  Stand/firm  1 HHA  Cues / breath    RPE 5/10    Knee flex/ext mach        Leg Press                        Ther Activity                        Gait Training             **           Modalities

## 2021-04-19 NOTE — PROGRESS NOTES
Speech-Language Pathology Treatment Note    Today's date: 2021  Patient name: Denver Lurie  : 1980  MRN: [id number]  Referring provider: GABRIELA Aguirre  Dx:   Encounter Diagnosis     ICD-10-CM    1  COVID-19 virus detected  U07 1      Medical History significant for:   Past Medical History:   Diagnosis Date    COVID-19 2021    Disease of thyroid gland     Hypertension      Flowsheet:  Start Time: 0900  Stop Time: 1000  Total time in clinic (min): 60 minutes    Subjective:  Patient reports that she is "very frustrated" in multiple facets of her life, unable to do even basic tasks as she was prior  Discussed regulating work schedule, taking breaks if they will be beneficial for the overall work day  Objective:  1  Pt will complete mental manipulation tasks of up to 4 words and 4 numbers @ 80% accuracy min-mod cue   :  FWD @ 100% acc i'ly, BWD @ 100%, ABC @ 100%  2  Patient will complete deductive reasoning problems with increased complexity and decreased prompt from min cue to independently @ 100% acc over 4 weeks  3   Pt will answer "wh" questions in response to paragraphs read to self and read outloud by clinician @ 80% ketan  4   Pt will complete high level multi-tier deductive reasoning puzzles with no more than 2 errors per puzzle  5   Patient will complete rapid naming tasks with familliar pictures increasing processing speed throughout  6   Patient will name atleast 10 items in an abstract category independently in less than 2 minutes  :  Presidents 16 (1 minute), kahlil stones 9, things that reflect light 6  7  Patient will complete various sustained and divided attention tasks for 2+ minutes while attending to auditory/visual stimuli with task average decreasing closer to the average range  8   Pt will utilize atleast 2 word finding strategies in conversation in and out of session ketan  : Word finding strategies provided    9   Pt will utilize atleast 2 memory strategies in structured 3 word, # or picture immediate memory recall tasks with carryover at home  4/19:  Memory strategies provided  Long Term Goals:  1  Pt will increase cognitive linguistic skills across all settings  2  Pt will increase physical and mental endurance across all settings  Assessment:  Patient had a very good session today, patient struggles with both analytical as well as linguistic concepts  Discussed PBA with patient, verbalized understanding      Recommendations:  -Patient would benefit from outpatient skilled Speech Therapy services : Cognitive-Linguistic therapy   Recommend neurology consult as well as optometry    -Frequency: 2x weekly  -Duration: 4-6 weeks    -Intervention certification from: 9/56/4703  -Intervention certification to: 0/42/48    -Intervention comments: puzzles, interactive metronome, memory/word finding strategies    Visit: 2

## 2021-04-20 ENCOUNTER — OFFICE VISIT (OUTPATIENT)
Dept: OCCUPATIONAL THERAPY | Facility: CLINIC | Age: 41
End: 2021-04-20
Payer: COMMERCIAL

## 2021-04-20 ENCOUNTER — OFFICE VISIT (OUTPATIENT)
Dept: PHYSICAL THERAPY | Facility: CLINIC | Age: 41
End: 2021-04-20
Payer: COMMERCIAL

## 2021-04-20 DIAGNOSIS — R29.898 WEAKNESS OF BOTH LOWER EXTREMITIES: ICD-10-CM

## 2021-04-20 DIAGNOSIS — U07.1 COVID-19 VIRUS DETECTED: Primary | ICD-10-CM

## 2021-04-20 DIAGNOSIS — Z74.09 DECREASED FUNCTIONAL MOBILITY AND ENDURANCE: ICD-10-CM

## 2021-04-20 PROCEDURE — 97110 THERAPEUTIC EXERCISES: CPT

## 2021-04-20 PROCEDURE — 97530 THERAPEUTIC ACTIVITIES: CPT

## 2021-04-20 PROCEDURE — 97110 THERAPEUTIC EXERCISES: CPT | Performed by: PHYSICAL THERAPIST

## 2021-04-20 NOTE — PROGRESS NOTES
Daily Note     Today's date: 2021  Patient name: Sohail Curry  : 1980  MRN: [id number]  Referring provider: GABRIELA Juarez  Dx:   Encounter Diagnosis     ICD-10-CM    1  COVID-19 virus detected  U07 1                   Subjective: "When I focus on my walking, I forget about my breathing "      Objective: See treatment diary below      Assessment: Tolerated treatment well  Patient exhibited good technique with therapeutic exercises and would benefit from continued OT  Patient tolerated additional Dynavision activity for endurance; however, patient reported feeling lightheaded after  Symptoms subsided after rest break  Patient reports tingling in hands during exercises but symptoms resolve after exercise  Patient's SpO2 levels and HR remained consistent during activity  Patient increased duration on UEB this session  Plan: Continue per plan of care  Progress treatment as tolerated          Vitals 2021   Pre Treatment   110/70 BP  98%  64 HR SpO2 - 99%  HR 67    SpO2 - 99%  HR - 68   During Treatment   112/78 BP  98%  62 HR SpO2 - 98%  HR - 77  SpO2 - 97%  HR - 70  SpO2 - 98%  HR - 67   Post Treatment   112/72 BP  99%  68 HR SpO2 - 98%  HR 67  SpO2 - 99%  HR - 68  SpO2 99%  HR - 64                 Neuro Re-Ed  2021                                                                                                       Ther Ex 2021     Thera-Band  Sh Ext     Triceps Ext       Retraction           Biceps Curl      Yellow  X 6 99% HR 67  X 5 99% HR 64    X 5 99% HR 63  X 5 99% HR 65     X 5 98% HR 64  X 5 99% HR 67  Yellow   X 10 98% HR 71        X 10 99% HR 74        X 10 99%  HR 68   Yellow   x 10 99%   HR 68          X 15 99% HR 67      X 10 99% HR 64   Digi-Flex   Red x 20   Red x 20  Green/Red x 20   UBE Machine   1:00 w/ Fatigue  98% HR 74  1:28 Recover  1:00 w/ Fatigue  99% HR 68  3:30 Recover  0:35 w/ Fatigue  99% HR 69 1:30 then 1 5 min break, then completed second 1:30  - HR 78 and SpO2 at 99%, no increased breathing noted  1:30 99% SpO2 59 HR, break for 1:30    1:15 99% SpO2 67 HR, break for 2:18    1:30 98% SpO2 70 HR 2:00 then 1:33 break  99 SpO2  74 HR    2:00 then 3:00 break   99 SpO2 74HR    2:00 then 3:00 break  99 SpO2  76HR   Nu Step 1:49 w/ Fatigue  91% HR 71  2:45 to Recover  99%           Thera-Ball  Forward Flex  Rotation  Diagonal   Yellow  X 20   Yellow  X 20 99% 80HR                                               Ther Activity 04/01/2021 04/06/2021 4/12/2021 04/16/2021     Grooved Peg Board   R Hand 1:37  L Hand 1:34  R Hand 1:50  L Hand 1:50 3  In/out     Tension Pin Pom Pom             [employer] Peg Board             Brain Box w/ FM Activity   [facility] while completing Tension Pin Pom Pom  6/8  [facility] while completing Tension Pin Pom Pom   6/8     Laundry Management         Stood for 2:10 98% SpO2  73 HR 1:45 break    Stood for 1:35  99% SpO2 70HR    Dynavision  A      A Endurance  B - 50% Green          A - Addition          26/2 21   98% SpO2  76 HR    15/15/2 02 R  15/15/1 89 G  99% SpO2  70 HR        34/1 76  99% SpO2  68 HR    14/14/2 05 R  14/14/2 20 G  99% SpO2  68 HR    30/2 00/2omitted  99% SpO2  70 HR   Tension Pin on Pole   All Pins on/off pole w/ increased time x 3 breaks needed     Large Peg Board   Pt completed large peg board using b/l hands lacing poms on top with green tension pin     Black Peg Board     Tweezer  X 10 L  X 10 R                                 Modalities 04/01/2021 04/06/2021 04/12/2021 04/16/2021 04/20/2021

## 2021-04-21 ENCOUNTER — OFFICE VISIT (OUTPATIENT)
Dept: SPEECH THERAPY | Facility: CLINIC | Age: 41
End: 2021-04-21
Payer: COMMERCIAL

## 2021-04-21 DIAGNOSIS — R41.841 COGNITIVE COMMUNICATION DEFICIT: ICD-10-CM

## 2021-04-21 DIAGNOSIS — U07.1 COVID-19 VIRUS DETECTED: Primary | ICD-10-CM

## 2021-04-21 PROCEDURE — 92507 TX SP LANG VOICE COMM INDIV: CPT | Performed by: NURSE PRACTITIONER

## 2021-04-21 NOTE — PROGRESS NOTES
Daily Note     Today's date: 2021  Patient name: Fantasma Davis  : 1980  MRN: [id number]  Referring provider: GABRIELA Eng  Dx:   Encounter Diagnosis     ICD-10-CM    1  COVID-19 virus detected  U07 1    2  Weakness of both lower extremities  R29 898    3  Decreased functional mobility and endurance  Z74 09                   Subjective: Patient reports she is doing okay  Objective: See treatment diary below      Assessment: Tolerated treatment well  Patient demonstrated fatigue post treatment and would benefit from continued PT  Overall progressing steadily with program   Vitals stable  Plan: Continue per plan of care        Precautions: falls, monitor vitals  Re-eval Date: 2021    Date    Visit Count 1 2 3 4 5   FOTO See IE       Pain In See IE   Left flank Left flank   Pain Out See IE   Same Same       Manuals         prn                               Neuro Re-Ed        Star Drill        Natus Foam v Bosu       Core stability Seated  Disc   Seated/ disc  AH 2x10 5"  ADD 2x10 5"  ABD 2x10 5"  Red  2 min rest Reviewed  Encourage   W/ HEP Seated/ disc  AH 2x10 5"  ADD 2x10 5"  ABD 2x10 5"  isometric Resume   Multidirectional board                                Ther Ex 140s/90s  HR 70s-90s  SpO2 -  HR 70-90s  SPO2 96-98% HR 66,77  SPO2  96-99% HR 70s  SpO2 % on RA HR 70-80s   SpO2  97-99% on RA   Aerobic  Nustep  L1 w/o UE  Seated rest between timed effort  2x 1 min  3x 2 min  SPM <40  VSS Nustep  L1 w/o UE  2 min Seated rest between timed effort  3x 1 min  3x 2 min  SPM <40  VSS Nustep  L3 w UE  1 min per trial x 7 with 3 min trial at L1  Seated rest between timed effort    RPE 2-3/10    SRB  AROM  1 min x 3  2 min x 2    RPE 2-3/10 Upright bike  4 mins AROM  4 mins AROM  2 mins AROM    RPE: 2-3    TM  6 mins at 0%  0 5-0 8 rpms  4 mins at 0%  0 5-1 3 rpms    RPE: 2-3   SLR x 4  BL  Stand/firm  1x10 flex/ABD BL  Stand/firm  1 HHA  Cues / breath    2x10 flex  2 min seated rest    2x10 ABD  2 min seated rest    2x10 ext  2 min seated rest    VSS   BL  Stand/firm  1 HHA  Cues / breath    SLR x flex and ABd    RPE   2-3/10 HEP   HR/TR  BL stand/firm  10x  2 HHA BL stand/firm  2x10 ea   Flex  2 HHA  2 min seated rest BL stand/firm  2x10 ea   Flex  2 HHA    RPE 2-3/10 HEP   Mini Squats    BL  Stand/firm  1 HHA  Cues / breath    RPE 5/10 Resume NV   Knee flex/ext mach     11#  20x  RPE 3-4    11#  20x  RPE 3-4   Leg Press     30#  40x  RPE 2   Prayer stretch     To tolerance  Fwd  And to right   LTR     Seated  To tolerance  Cues for deep breathing  2x ea side   Ther Activity                        Gait Training             **NV           Modalities

## 2021-04-21 NOTE — PROGRESS NOTES
Speech-Language Pathology Treatment Note    Today's date: 2021  Patient name: Edmond Burnett  : 1980  MRN: [id number]  Referring provider: GABRIELA Blackman  Dx:   Encounter Diagnosis     ICD-10-CM    1  COVID-19 virus detected  U07 1    2  Cognitive communication deficit  R41 841      Medical History significant for:   Past Medical History:   Diagnosis Date    COVID-19 2021    Disease of thyroid gland     Hypertension      Flowsheet:  Start Time: 08  Stop Time: 0900  Total time in clinic (min): 60 minutes    Subjective:  Patient reports that she is "very frustrated" in multiple facets of her life, unable to do even basic tasks as she was prior  Pt pending her covid-19 vaccine, if no improvement of her symptoms following the vaccine, she reports she will be making an appointment with neurology  Switch at to notebook system at home to associate note and meaning  Write down and verbalize it outloud  Work at home: changed schedule to 1 hour increments followed short breaks throughout the day which she did find helpful  *following alternating attention activity left eye twitch ( lip and R/L eye twitch will vary day to day)  Eye twitch resolved by end of session  Objective:  1  Pt will complete mental manipulation tasks of up to 4 words and 4 numbers @ 80% accuracy min-mod cue   :  FWD @ 100% acc i'ly, BWD @ 100%, ABC @ 100%     2  Patient will complete deductive reasoning problems with increased complexity and decreased prompt from min cue to independently @ 100% acc over 4 weeks  3   Pt will answer "wh" questions in response to paragraphs read to self and read outloud by clinician @ 80% ketan  4   Pt will complete high level multi-tier deductive reasoning puzzles with no more than 2 errors per puzzle  5   Patient will complete rapid naming tasks with familliar pictures increasing processing speed throughout      6   Patient will name atleast 10 items in an abstract category independently in less than 2 minutes  4/19:  Presidents 16 (1 minute), kahlil stones 9, things that reflect light 6    7  Patient will complete various sustained and divided attention tasks for 2+ minutes while attending to auditory/visual stimuli with task average decreasing closer to the average range  4/21 blink cards sorting color/shape 3:57 no errors  ( utilized visual cue and self verbalization cueing to remind self of category- this helped keep her attention and reduce uncontrollable laughter)  Sort color/shape/number 2:38 no errors ( visual cue + verbalization cue)  Date Task type Task Average  SRO Early  Late  Variance    4/21 Short form task 1  Task 2  2 min visual BH 38   98  45 12  4  17 38  98  83 62  2  17 45  68  41                                                                 8   Pt will utilize atleast 2 word finding strategies in conversation in and out of session ketan  4/19: Word finding strategies provided  9   Pt will utilize atleast 2 memory strategies in structured 3 word, # or picture immediate memory recall tasks with carryover at home  4/19:  Memory strategies provided  4/21 verbalization strategy taught in session and demonstrated effectiveness! Long Term Goals:  1  Pt will increase cognitive linguistic skills across all settings  2  Pt will increase physical and mental endurance across all settings  Assessment:  Patient had a very good session today, patient struggles with both analytical as well as linguistic concepts  Uncontrollable laughter ( concern for PBA) 3x in session       Recommendations:  -Patient would benefit from outpatient skilled Speech Therapy services : Cognitive-Linguistic therapy   Recommend neurology consult as well as optometry    -Frequency: 2x weekly  -Duration: 4-6 weeks    -Intervention certification from: 1/78/6080  -Intervention certification to: 7/93/84    -Intervention comments: puzzles, interactive metronome, memory/word finding strategies    Visit: 3

## 2021-04-22 ENCOUNTER — OFFICE VISIT (OUTPATIENT)
Dept: PHYSICAL THERAPY | Facility: CLINIC | Age: 41
End: 2021-04-22
Payer: COMMERCIAL

## 2021-04-22 ENCOUNTER — APPOINTMENT (OUTPATIENT)
Dept: SPEECH THERAPY | Facility: CLINIC | Age: 41
End: 2021-04-22
Payer: COMMERCIAL

## 2021-04-22 ENCOUNTER — OFFICE VISIT (OUTPATIENT)
Dept: OCCUPATIONAL THERAPY | Facility: CLINIC | Age: 41
End: 2021-04-22
Payer: COMMERCIAL

## 2021-04-22 DIAGNOSIS — U07.1 COVID-19 VIRUS DETECTED: Primary | ICD-10-CM

## 2021-04-22 DIAGNOSIS — Z74.09 DECREASED FUNCTIONAL MOBILITY AND ENDURANCE: ICD-10-CM

## 2021-04-22 DIAGNOSIS — R29.898 WEAKNESS OF BOTH LOWER EXTREMITIES: ICD-10-CM

## 2021-04-22 PROCEDURE — 97530 THERAPEUTIC ACTIVITIES: CPT

## 2021-04-22 PROCEDURE — 97110 THERAPEUTIC EXERCISES: CPT | Performed by: PHYSICAL THERAPIST

## 2021-04-22 PROCEDURE — 97110 THERAPEUTIC EXERCISES: CPT

## 2021-04-22 NOTE — PROGRESS NOTES
Daily Note     Today's date: 2021  Patient name: Albertina Mejía  : 1980  MRN: [id number]  Referring provider: GABRIELA Hicks  Dx:   Encounter Diagnosis     ICD-10-CM    1  COVID-19 virus detected  U07 1                   Subjective: "I feel pretty good today "      Objective: See treatment diary below      Assessment: Tolerated treatment well  Patient exhibited good technique with therapeutic exercises and would benefit from continued OT  Patient tolerated increases in therapeutic exercises and duration of UBE with reminders to take breaks as needed  Patient tolerated additional thera-band exercises to increase upper body strength  Patient was able to complete all Thera-ball exercises for standing endurance but demonstrated some fatigue following exercises  Plan: Continue per plan of care  Progress treatment as tolerated         Vitals 2021   Pre Treatment SpO2 - 98%  HR - 74     SpO2 - 99%  HR - 68   During Treatment SpO2 - 98%  HR - 75     SpO2 - 98%  HR - 67   Post Treatment SpO2 - 98%  HR - 74     SpO2 99%  HR - 64             Neuro Re-Ed                                                                                Ther Ex 2021        Thera-Band  Sh Ext       Triceps Ext       Retraction         Biceps Curl    99%/74HR  Yellow  X 20 99%/75HR  X 20   98%/75HR    X 20  99%/64HR      X 20 98%/72HR    Yellow   x 10 99%   HR 68          X 15 99% HR 67      X 10 99% HR 64   Digi-Flex Green x 20     Green/Red x 20   UBE Machine 98%/72HR  2:08 then 2:00 break  98%/76HR  2:22 then 1:20 break  98%/75HR  2:50 then 1:00 break  98%/70HR  2:    2:00 then 1:33 break  99 SpO2  74 HR    2:00 then 3:00 break   99 SpO2 74HR    2:00 then 3:00 break  99 SpO2  76HR   Nu Step         Thera-Ball  Forward Flex    Rotation    Diagonal Yellow  X 20 98%/80HR  X 20  98%/74HR                                      Ther Activity 2021        Grooved Peg Board R - 1:35 37  L - 1:35 89     Tension Pin Pom Pom Green - around the world 6/8        Purdue Peg Board         Brain Box w/ FM Activity         Laundry Management     Stood for 2:10 98% SpO2  73 HR 1:45 break    Stood for 1:35  99% SpO2 70HR    Dynavision  A      A Endurance  B - 50% Green          A - Addition        34/1 76  99% SpO2  68 HR    14/14/2 05 R  14/14/2 20 G  99% SpO2  68 HR    30/2 00/2omitted  99% SpO2  70 HR   Tension Pin on Pole        Large Peg Board        Black Peg Board Tweezer  X 10 L  X 10 R      Tweezer  X 10 L  X 10 R                             Modalities      04/20/2021

## 2021-04-26 ENCOUNTER — OFFICE VISIT (OUTPATIENT)
Dept: SPEECH THERAPY | Facility: CLINIC | Age: 41
End: 2021-04-26
Payer: COMMERCIAL

## 2021-04-26 DIAGNOSIS — U07.1 COVID-19 VIRUS DETECTED: Primary | ICD-10-CM

## 2021-04-26 PROCEDURE — 92507 TX SP LANG VOICE COMM INDIV: CPT

## 2021-04-26 NOTE — PROGRESS NOTES
Daily Note     Today's date: 2021  Patient name: Adi Moreno  : 1980  MRN: [id number]  Referring provider: GABRIELA Alcantar  Dx:   Encounter Diagnosis     ICD-10-CM    1  COVID-19 virus detected  U07 1    2  Weakness of both lower extremities  R29 898    3  Decreased functional mobility and endurance  Z74 09                   Subjective: got my COVID vacine Thursday night  Next  Day had difficulty with HR being up lasted entire weekend  Increase difficulty with breathing and walking   Hard to even lift my legs  Significant decrease activities over the weekend  Started feeling better on Monday But still have some brain fog        Objective: See treatment diary below      Assessment: Tolerated treatment well  VSS t/o rx session  Pt reported mm fatigue end rx session Pt provided cues for proper breathing with exercise  Progression steady with POC   Patient would benefit from continued PT      Plan: Continue per plan of care        Precautions: falls, monitor vitals  Re-eval Date: 2021     Date        Visit Count 6       FOTO completed       Pain In Left flank       Pain Out same           Manuals                                        Neuro Re-Ed        Star Drill        Natus        Core stability Sit/disc  AH 10x 5"  AH w/chest press (red wt ball) 10x  AH with Alt LE 10x  AH with alt UE 10x       Multidirectional board                                Ther Ex HR 70-80s   SpO2  97-99%        Aerobic Nustep  L2  4 min x2  SPM 50-60    TM  6 mins at 0%  0 5-1 0 rpms    RPE: 0-1RL-Yqxzgck bike  4 mins AROM  4 mins AROM  2 mins AROM    RPE: 2-3           SLR x 4 HEP       HR/TR HEP       Mini Squats Resume NV       Knee flex/ext mach 11#  3x10  RPE 2-3    22#  3x10  RPE 2-3       Leg Press 30#  40x  RPE 2       Prayer stretch Resume upcoming  To tolerance  Fwd  And to right       LTR NP/ Resume upcoming  Seated  To tolerance  Cues for deep breathing  2x ea side       Ther Activity Gait Training         **NV               Modalities

## 2021-04-26 NOTE — PROGRESS NOTES
Speech-Language Pathology Treatment Note    Today's date: 2021  Patient name: Brianna Zamorano  : 1980  MRN: [id number]  Referring provider: GABRIELA Yanes  Dx:   Encounter Diagnosis     ICD-10-CM    1  COVID-19 virus detected  U07 1      Medical History significant for:   Past Medical History:   Diagnosis Date    COVID-19 2021    Disease of thyroid gland     Hypertension      Flowsheet:  Start Time: 0700  Stop Time: 0800  Total time in clinic (min): 60 minutes    Subjective:  Patient reports that she did have a lot of symptoms following her Covid vaccine, she was able to drive to session this morning  No HA on arrival   Patient also participated in family game night but developed an eye twitch and stopped  She also got herself a calendar and organized her thoughts/appts/dates  Switch at to notebook system at home to associate note and meaning  Write down and verbalize it outloud  Work at home: changed schedule to 1 hour increments followed short breaks throughout the day which she did find helpful  *following alternating attention activity left eye twitch ( lip and R/L eye twitch will vary day to day)  Eye twitch resolved by end of session  Objective:  1  Pt will complete mental manipulation tasks of up to 4 words and 4 numbers @ 80% accuracy min-mod cue   :  FWD @ 100% acc i'ly, BWD @ 100%, ABC @ 100%     2  Patient will complete deductive reasoning problems with increased complexity and decreased prompt from min cue to independently @ 100% acc over 4 weeks  3   Pt will answer "wh" questions in response to paragraphs read to self and read outloud by clinician @ 80% ketan  4   Pt will complete high level multi-tier deductive reasoning puzzles with no more than 2 errors per puzzle  5   Patient will complete rapid naming tasks with familliar pictures increasing processing speed throughout  :  Named "50+" items in 5 minutes      6   Patient will name atleast 10 items in an abstract category independently in less than 2 minutes  4/19:  Presidents 16 (1 minute), kahlil stones 9, things that reflect light 6  4/26:  Bodies of water 13, furniture 11, occupations 20, trees 10    7  Patient will complete various sustained and divided attention tasks for 2+ minutes while attending to auditory/visual stimuli with task average decreasing closer to the average range  4/21 blink cards sorting color/shape 3:57 no errors  ( utilized visual cue and self verbalization cueing to remind self of category- this helped keep her attention and reduce uncontrollable laughter)  Sort color/shape/number 2:38 no errors ( visual cue + verbalization cue)  Date Task type Task Average  SRO Early  Late  Variance    4/21 Short form task 1  Task 2  2 min visual BH 38   98  45 12  4  17 38  98  83 62  2  17 45  68  41                                                           8   Pt will utilize atleast 2 word finding strategies in conversation in and out of session ketan  4/19: Word finding strategies provided  4/26:  "Hit or miss" pt reports drafting emails and not sending them  WF in session x2 SC    9  Pt will utilize atleast 2 memory strategies in structured 3 word, # or picture immediate memory recall tasks with carryover at home  4/19:  Memory strategies provided  4/21 verbalization strategy taught in session and demonstrated effectiveness! 4/26:  Drafting emails and not sending them (attention)  Long Term Goals:  1  Pt will increase cognitive linguistic skills across all settings  2  Pt will increase physical and mental endurance across all settings  Assessment:  Patient had a GREAT session today! Education provided      Recommendations:  -Patient would benefit from outpatient skilled Speech Therapy services : Cognitive-Linguistic therapy   Recommend neurology consult as well as optometry    -Frequency: 2x weekly  -Duration: 4-6 weeks    -Intervention certification from: 4/14/2021  -Intervention certification to: 3/14/47    -Intervention comments: puzzles, interactive metronome, memory/word finding strategies    Visit: 4

## 2021-04-27 ENCOUNTER — OFFICE VISIT (OUTPATIENT)
Dept: PHYSICAL THERAPY | Facility: CLINIC | Age: 41
End: 2021-04-27
Payer: COMMERCIAL

## 2021-04-27 ENCOUNTER — OFFICE VISIT (OUTPATIENT)
Dept: OCCUPATIONAL THERAPY | Facility: CLINIC | Age: 41
End: 2021-04-27
Payer: COMMERCIAL

## 2021-04-27 ENCOUNTER — APPOINTMENT (OUTPATIENT)
Dept: SPEECH THERAPY | Facility: CLINIC | Age: 41
End: 2021-04-27
Payer: COMMERCIAL

## 2021-04-27 DIAGNOSIS — R29.898 WEAKNESS OF BOTH LOWER EXTREMITIES: ICD-10-CM

## 2021-04-27 DIAGNOSIS — U07.1 COVID-19 VIRUS DETECTED: Primary | ICD-10-CM

## 2021-04-27 DIAGNOSIS — Z74.09 DECREASED FUNCTIONAL MOBILITY AND ENDURANCE: ICD-10-CM

## 2021-04-27 PROCEDURE — 97530 THERAPEUTIC ACTIVITIES: CPT

## 2021-04-27 PROCEDURE — 97110 THERAPEUTIC EXERCISES: CPT

## 2021-04-27 NOTE — PROGRESS NOTES
Daily Note     Today's date: 2021  Patient name: Mary Ochoa  : 1980  MRN: [id number]  Referring provider: GABRIELA Ya  Dx:   Encounter Diagnosis     ICD-10-CM    1  COVID-19 virus detected  U07 1                   Subjective: "I feel good today "      Objective: See treatment diary below      Assessment: Tolerated treatment well  Patient demonstrated fatigue post treatment and would benefit from continued OT  Patient did well with increase in resistance and reps for TB exercises  Patient's reaction time and scores on Dynavision activity improved as compared to previous sessions  Patient displayed improved endurance throughout exercises and increased duration during UBE activity with decreased breaks in between  Patient was able to correctly answer 18/20 questions during divided attention tasks while on the UBE  Patient displayed some fatigue towards end of session  Verbal reminders were given to take breaks as necessary  Plan: Continue per plan of care  Progress treatment as tolerated           Vitals 2021      Pre Treatment SpO2 - 98%  HR - 74 98%  71HR      During Treatment SpO2 - 98%  HR - 75 SpO2 - 98%  HR - 72      Post Treatment SpO2 - 98%  HR - 74 SpO2 - 97%  HR - 82               Neuro Re-Ed                                                                        Ther Ex 2021      Thera-Band  Sh Ext       Triceps Ext       Retraction         Biceps Curl    99%/74HR  Yellow  X 20 99%/75HR  X 20   98%/75HR    X 20  99%/64HR      X 20 98%/72HR Red  X 30  98% 76HR    X 15  98% 81HR    X 20  98% 77HR      X 20  97% 78HR      Digi-Flex Green x 20 Green x 20 + around the world  68      UBE Machine 98%/72HR  2:08 then 2:00 break  98%/76HR  2:22 then 1:20 break  98%/75HR  2:50 then 1:00 break  98%/70HR  2:  w/ divided attention task 18/20 questions  4:00 98% 77HR  Break 1:00  4:00 98% 72HR   Break 1:30  2:00 % 98% 81HR      Nu Step Thera-Ball  Forward Flex    Rotation    Diagonal Yellow  X 20 98%/80HR  X 20  98%/74HR Yellow  X 15  98% 75HR  X 10  97% 82HR                                   Ther Activity 04/22/2021 04/27/2021      Grooved Peg Board R - 1:35 37  L - 1:35 89       Tension Pin Pom Pom Green - around the world 6/8 Green - all the W W  [employer]      [employer]urdue Peg Board        Brain Box w/ [employer] Activity        Laundry Management        Dynavision  A      A Endurance  B - 50% Green          A - Addition  98% 76HR  49/1 22  98% 74HR      18/18/1 51 R  19/19/1 66L  98% 69HR      2 misses  37/1 62  98% 69HR      Tension Pin on Pole        Large Peg Board        Black Peg Board Tweezer  X 10 L  X 10 R Tweezer  X 10 L  X 10 L                                 Modalities 04/22/2021 04/27/2021                                                  Documentation and treatment completed this session by BETTY Landry under the direct supervision of Avery Jones MS, OTR/L

## 2021-04-28 ENCOUNTER — OFFICE VISIT (OUTPATIENT)
Dept: SPEECH THERAPY | Facility: CLINIC | Age: 41
End: 2021-04-28
Payer: COMMERCIAL

## 2021-04-28 DIAGNOSIS — U07.1 COVID-19 VIRUS DETECTED: Primary | ICD-10-CM

## 2021-04-28 DIAGNOSIS — R41.841 COGNITIVE COMMUNICATION DEFICIT: ICD-10-CM

## 2021-04-28 PROCEDURE — 92507 TX SP LANG VOICE COMM INDIV: CPT | Performed by: NURSE PRACTITIONER

## 2021-04-28 NOTE — PROGRESS NOTES
Speech-Language Pathology Treatment Note    Today's date: 2021  Patient name: Samantha Du  : 1980  MRN: [id number]  Referring provider: GABRIELA Harris  Dx:   Encounter Diagnosis     ICD-10-CM    1  COVID-19 virus detected  U07 1    2  Cognitive communication deficit  R41 841      Medical History significant for:   Past Medical History:   Diagnosis Date    COVID-19 2021    Disease of thyroid gland     Hypertension      Flowsheet:  Start Time: 800  Stop Time: 900  Total time in clinic (min): 60 minutes    Subjective:  Patient reports endurance is slowly increasing as well as strength  S/p COVID vaccine no more lingering symptoms, no headache  Eye twitch as well as leg twitch will come and go overly fatigued or increased stress  She also got herself a calendar and organized her thoughts/appts/dates  Patient has implemented a note taking system for work which is working  Verbalization strategy is also helping patient  Friday -  therapy services, able to stay focused for up to a "few hours" followed by a break  This was not the pattern for Monday-now, will decrease speech therapy to 1x per week if needed  White noise in the background is also helping keep focus at work but not consistently  Objective:  1  Pt will complete mental manipulation tasks of up to 4 words and 4 numbers @ 80% accuracy min-mod cue   :  FWD @ 100% acc i'ly, BWD @ 100%, ABC @ 100%     2  Patient will complete deductive reasoning problems with increased complexity and decreased prompt from min cue to independently @ 100% acc over 4 weeks  3   Pt will answer "wh" questions in response to paragraphs read to self and read outloud by clinician @ 80% ketan  4   Pt will complete high level multi-tier deductive reasoning puzzles with no more than 2 errors per puzzle     completed puzzle number 5, 5x4 with 11 clues completed @ 100% min cue      5   Patient will complete rapid naming tasks with familliar pictures increasing processing speed throughout  4/26:  Named "50+" items in 5 minutes  6   Patient will name atleast 10 items in an abstract category independently in less than 2 minutes  4/19:  Presidents 16 (1 minute), kahlil stones 9, things that reflect light 6  4/26:  Bodies of water 13, furniture 11, occupations 20, trees 10    7  Patient will complete various sustained and divided attention tasks for 2+ minutes while attending to auditory/visual stimuli with task average decreasing closer to the average range  4/21 blink cards sorting color/shape 3:57 no errors  ( utilized visual cue and self verbalization cueing to remind self of category- this helped keep her attention and reduce uncontrollable laughter)  Sort color/shape/number 2:38 no errors ( visual cue + verbalization cue)  4/28 during IM 2 min task, patient OOB and rest needed due to endurance between reps  Standing and switching hand positioning  Date Task type Task Average  SRO Early  Late  Variance    4/21 Short form task 1  Task 2  2 min visual BH 38   98  45 12  4  17 38  98  83 62  2  17 45  68  41   4/28 BH 2 min vis  " 52  56 69  7 65  19 43  06 84  17                                                  8   Pt will utilize atleast 2 word finding strategies in conversation in and out of session ketan  4/19: Word finding strategies provided  4/26:  "Hit or miss" pt reports drafting emails and not sending them  WF in session x2 SC    9  Pt will utilize atleast 2 memory strategies in structured 3 word, # or picture immediate memory recall tasks with carryover at home  4/19:  Memory strategies provided  4/21 verbalization strategy taught in session and demonstrated effectiveness! 4/26:  Drafting emails and not sending them (attention)  Long Term Goals:  1  Pt will increase cognitive linguistic skills across all settings  2  Pt will increase physical and mental endurance across all settings       Assessment:  Patient had a GREAT session today! Education provided      Recommendations:  -Patient would benefit from outpatient skilled Speech Therapy services : Cognitive-Linguistic therapy   Recommend neurology consult as well as optometry    -Frequency: 2x weekly  -Duration: 4-6 weeks    -Intervention certification from: 0/63/9270  -Intervention certification to: 4/57/04    -Intervention comments: puzzles, interactive metronome, memory/word finding strategies    Visit: 5

## 2021-04-28 NOTE — PROGRESS NOTES
Daily Note     Today's date: 2021  Patient name: Denver Lurie  : 1980  MRN: [id number]  Referring provider: GABRIELA Aguirre  Dx:   Encounter Diagnosis     ICD-10-CM    1  COVID-19 virus detected  U07 1    2  Weakness of both lower extremities  R29 898    3  Decreased functional mobility and endurance  Z74 09                   Subjective: Doing better  Feeling muscle wake up  Did have trouble with IM in speech, tired easily  Objective: See treatment diary below      Assessment: Tolerated treatment well  Patient demonstrated fatigue post treatment and would benefit from continued PT  Patient progressing steadily  Endurance limited, vitals remain stable on RA  Plan: Continue per plan of care          Precautions: falls, monitor vitals  Re-eval Date: 2021     Date       Visit Count 6 7      FOTO completed       Pain In Left flank       Pain Out same           Manuals                                        Neuro Re-Ed        Star Drill        Natus        Core stability Sit/disc  AH 10x 5"  AH w/chest press (red wt ball) 10x  AH with Alt LE 10x  AH with alt UE 10x Sit/disc  [place]    MTP/LTP  10#  20x    Antitrunk      Multidirectional board  6#  20x                              Ther Ex HR 70-80s   SpO2  97-99%  HR 70-80  SpO2  98% on RA      Aerobic Nustep  L2  4 min x2  SPM 50-60    TM  6 mins at 0%  0 5-1 0 rpms    RPE: 6-7UK-Xkxfkiy bike  4 mins AROM  4 mins AROM  2 mins AROM    RPE: 2-3     TM  10 mins  0%  1 0-1 4 mph    RPE  1-2/10    5% @ 1 0  3 mins  3% @ 1 0  5 mins  0% at 1 0  2 mins    REP: 2-4      SLR x 4 HEP       HR/TR HEP       Mini Squats Resume NV       Knee flex/ext mach 11#  3x10  RPE 2-3    22#  3x10  RPE 2-3 22#  3x10  RPE 2-3    33#  2x10  RPE 2-3        Leg Press 30#  40x  RPE 2 90#  1x10  75#  1x10  60#  1x10  HR/TR  50#  3x10      Prayer stretch Resume upcoming  To tolerance  Fwd  And to right       LTR NP/ Resume upcoming  Seated  To tolerance  Cues for deep breathing  2x ea side       Ther Activity                        Gait Training         **NV               Modalities

## 2021-04-29 ENCOUNTER — APPOINTMENT (OUTPATIENT)
Dept: SPEECH THERAPY | Facility: CLINIC | Age: 41
End: 2021-04-29
Payer: COMMERCIAL

## 2021-04-29 ENCOUNTER — OFFICE VISIT (OUTPATIENT)
Dept: PHYSICAL THERAPY | Facility: CLINIC | Age: 41
End: 2021-04-29
Payer: COMMERCIAL

## 2021-04-29 ENCOUNTER — OFFICE VISIT (OUTPATIENT)
Dept: OCCUPATIONAL THERAPY | Facility: CLINIC | Age: 41
End: 2021-04-29
Payer: COMMERCIAL

## 2021-04-29 DIAGNOSIS — U07.1 COVID-19 VIRUS DETECTED: Primary | ICD-10-CM

## 2021-04-29 DIAGNOSIS — R29.898 WEAKNESS OF BOTH LOWER EXTREMITIES: ICD-10-CM

## 2021-04-29 DIAGNOSIS — Z74.09 DECREASED FUNCTIONAL MOBILITY AND ENDURANCE: ICD-10-CM

## 2021-04-29 PROCEDURE — 97110 THERAPEUTIC EXERCISES: CPT

## 2021-04-29 PROCEDURE — 97112 NEUROMUSCULAR REEDUCATION: CPT | Performed by: PHYSICAL THERAPIST

## 2021-04-29 PROCEDURE — 97530 THERAPEUTIC ACTIVITIES: CPT

## 2021-04-29 PROCEDURE — 97110 THERAPEUTIC EXERCISES: CPT | Performed by: PHYSICAL THERAPIST

## 2021-04-29 NOTE — PROGRESS NOTES
Daily Note     Today's date: 2021  Patient name: Albina Romero  : 1980  MRN: [id number]  Referring provider: GABRIELA Roman  Dx:   Encounter Diagnosis     ICD-10-CM    1  COVID-19 virus detected  U07 1                   Subjective: "My brain feels foggy today so I am surprised I was able to do that "      Objective: See treatment diary below      Assessment: Tolerated treatment well  Patient exhibited good technique with therapeutic exercises and would benefit from continued OT  Patient did well with increase in demands for black peg board activity  Patient noted feeling foggy this date, but was able to answer / around the world questions correctly while completing tension pin/pompom activity  Patient was able to complete 21 question activity while using the UBE  She correctly guessed the object "microwave" asking 13 questions and the object "walker" asking 15 questions  Following UBE, patient reported swelling in hands, but no subsequent complaints were made during session  Patient completed Multi-Matrix Super Saccades exercise with no symptoms, noting that it was very similar to her work tasks  Plan: Continue per plan of care  Progress treatment as tolerated         Vitals 2021     Pre Treatment SpO2 - 98%  HR - 74 98%  71HR Unable to obtain     During Treatment SpO2 - 98%  HR - 75 SpO2 - 98%  HR - 72 Unable to obtain     Post Treatment SpO2 - 98%  HR - 74 SpO2 - 97%  HR - 82 Unable to obtain              Neuro Re-Ed                                                                        Ther Ex 2021     Thera-Band  Sh Ext       Triceps Ext       Retraction         Biceps Curl    99%/74HR  Yellow  X 20 99%/75HR  X 20   98%/75HR    X 20  99%/64HR      X 20 98%/72HR Red  X 30  98% 76HR    X 15  98% 81HR    X 20  98% 77HR      X 20  97% 78HR      Digi-Flex Green x 20 Green x 20 + around the world  8 Green x 20     UBE Machine 98%/72HR  2:08 then 2:00 break  98%/76HR  2:22 then 1:20 break  98%/75HR  2:50 then 1:00 break  98%/70HR  2:  w/ divided attention task 18/20 questions  4:00 98% 77HR  Break 1:00  4:00 98% 72HR   Break 1:30  2:00 % 98% 81HR  w/ 21 questions 3:00 then   2 Min break  4:50 then  1:50 Min break  2:10 complete         Nu Step        Thera-Ball  Forward Flex    Rotation    Diagonal Yellow  X 20 98%/80HR  X 20  98%/74HR Yellow  X 15  98% 75HR  X 10  97% 82HR                                   Ther Activity 04/22/2021 04/27/2021 04/29/2021     Grooved Peg Board R - 1:35 37  L - 1:35 89  R - 1:25:77  L - 1:28:08     Tension Pin Pom Pom Green - around the world 6/8 Green - all the R R  [place] - around the world 8/8      [place] Peg Board        Brain Box w/ [employer] Activity        Laundry Management        Dynavision  A      A Endurance  B - 50% Green          A - Addition  98% 76HR  49/1 22  98% 74HR      18/18/1 51 R  19/19/1 66L  98% 69HR      2 misses  37/1 62  98% 69HR      Tension Pin on Pole        Large Peg Board        Black Peg Board Tweezer  X 10 L  X 10 R Tweezer  X 10 L  X 10 L   Tweezer  X 15 L  X 15 R     Multi-Matrix Super Saccades   Completed 33                      Modalities 04/22/2021 04/27/2021 04/29/2021                                                   Documentation and treatment completed this session by BETTY Reynolds under the direct supervision of Avery Jones MS, OTR/L

## 2021-04-30 NOTE — PROGRESS NOTES
Daily Note     Today's date: 5/3/2021  Patient name: Nicky Suarez  : 1980  MRN: [id number]  Referring provider: GABRIELA Ashraf  Dx:   Encounter Diagnosis     ICD-10-CM    1  COVID-19 virus detected  U07 1    2  Weakness of both lower extremities  R29 898    3   Decreased functional mobility and endurance  Z74 09                   Subjective: See RE      Objective: See treatment diary below      Assessment: See RE      Plan: See RE      Precautions: falls, monitor vitals  Re-eval Date: 2021     Date 4/27 4/29 5/3     Visit Count 6 7 8     FOTO completed  See RE     Pain In Left flank  See RE     Pain Out same  See RE         Manuals                                        Neuro Re-Ed        Star Drill        Natus        Core stability Sit/disc  AH 10x 5"  AH w/chest press (red wt ball) 10x  AH with Alt LE 10x  AH with alt UE 10x Sit/disc  [place]    MTP/LTP  10#  20x    Antitrunk      Multidirectional board  6#  20x         Balance tasks  TUG  Foam  Incline  EO/EC  FGA                     Ther Ex HR 70-80s   SpO2  97-99%  HR 70-80  SpO2  98% on RA HR 75-89  SpO2 95-99% on RA     Aerobic Nustep  L2  4 min x2  SPM 50-60    TM  6 mins at 0%  0 5-1 0 rpms    RPE: 2-3OW-Gnzkxvl bike  4 mins AROM  4 mins AROM  2 mins AROM    RPE: 2-3     TM  10 mins  0%  1 0-1 4 mph    RPE  1-2/10    5% @ 1 0  3 mins  3% @ 1 0  5 mins  0% at 1 0  2 mins    REP: 2-4 Elliptical  2 min  AROM  REP 5    TM  10 mins  0%  1 4 mph    7 mins  0%  1 4 mph    4 mins  0%  1 4 mph    2 mins  0%  0 7-1 0    RPE 2-4     SLR x 4 HEP       HR/TR HEP       Mini Squats Resume NV       Knee flex/ext mach 11#  3x10  RPE 2-3    22#  3x10  RPE 2-3 22#  3x10  RPE 2-3    33#  2x10  RPE 2-3        Leg Press 30#  40x  RPE 2 90#  1x10  75#  1x10  60#  1x10  HR/TR  50#  3x10      Prayer stretch Resume upcoming  To tolerance  Fwd  And to right       LTR NP/ Resume upcoming  Seated  To tolerance  Cues for deep breathing  2x ea side       Ther Activity                        Gait Training         **NV               Modalities

## 2021-05-03 ENCOUNTER — OFFICE VISIT (OUTPATIENT)
Dept: PHYSICAL THERAPY | Facility: CLINIC | Age: 41
End: 2021-05-03
Payer: COMMERCIAL

## 2021-05-03 ENCOUNTER — EVALUATION (OUTPATIENT)
Dept: OCCUPATIONAL THERAPY | Facility: CLINIC | Age: 41
End: 2021-05-03
Payer: COMMERCIAL

## 2021-05-03 ENCOUNTER — APPOINTMENT (OUTPATIENT)
Dept: SPEECH THERAPY | Facility: CLINIC | Age: 41
End: 2021-05-03
Payer: COMMERCIAL

## 2021-05-03 DIAGNOSIS — R29.898 WEAKNESS OF BOTH LOWER EXTREMITIES: ICD-10-CM

## 2021-05-03 DIAGNOSIS — U07.1 COVID-19 VIRUS DETECTED: Primary | ICD-10-CM

## 2021-05-03 DIAGNOSIS — Z74.09 DECREASED FUNCTIONAL MOBILITY AND ENDURANCE: ICD-10-CM

## 2021-05-03 PROCEDURE — 97530 THERAPEUTIC ACTIVITIES: CPT

## 2021-05-03 PROCEDURE — 97168 OT RE-EVAL EST PLAN CARE: CPT

## 2021-05-03 PROCEDURE — 97110 THERAPEUTIC EXERCISES: CPT | Performed by: PHYSICAL THERAPIST

## 2021-05-03 PROCEDURE — 97112 NEUROMUSCULAR REEDUCATION: CPT | Performed by: PHYSICAL THERAPIST

## 2021-05-03 PROCEDURE — 97110 THERAPEUTIC EXERCISES: CPT

## 2021-05-03 NOTE — PROGRESS NOTES
PT Re-Evaluation     Today's date: 5/3/2021  Patient name: Adi Moreno  : 1980  MRN: [id number]  Referring provider: GABRIELA Alcantar  Dx:   Encounter Diagnosis     ICD-10-CM    1  COVID-19 virus detected  U07 1    2  Weakness of both lower extremities  R29 898    3  Decreased functional mobility and endurance  Z74 09                   Assessment  Assessment details: Patient is a 38y o  year old female presenting to [facility] s/p diagnosis of covid-19 virus  She has been making slow, but steady progress thus far  She reports increasing endurance  Walking better  Strength improving overall  Cognition continues to be a challenge, worse with fatigue  Balance worse with increased fatigue  Ongoing challenges to address  Cont per POC  Impairments: abnormal coordination, abnormal gait, abnormal movement, activity intolerance, impaired balance, impaired physical strength, lacks appropriate home exercise program and safety issue  Understanding of Dx/Px/POC: good   Prognosis: good  Prognosis details: Positive prognostic indicators include positive attitude toward recovery and good understanding of diagnosis and treatment plan options  Negative prognostic indicators include chronicity of symptoms and co-morbidities  Goals  In 4 weeks, patient will:  1  Demonstrate ability to perform 30 minutes of activity without requiring seated rest break  - MET  2  Demonstrate ability to maintain upright balance unsupported by UEs while reaching above shoulder height without resistance to promote stability with ADLs - MET  3  Demonstrate ability to lift up to 15 lbs from  knees to waist unsupported by UEs to promote stability with ADLs - ONGOING  4  Demonstrate increased strength of bilateral LEs to allow for improved transfer quality and stability - ONGOING    In 4-10 weeks, patient will: - ONGOING  1  Demonstrate reduced fall risk based on outcome measures  2    Demonstrate consistent carryover with HEP  3  Return to community ambulation with least restrictive AD for at least 500+ feet - MET    New goals:  Demonstrate ability to perform 45 minutes of activity without seated rest break, without UE support  Plan  Patient would benefit from: skilled physical therapy  Other planned modality interventions: Modalities prn for symptom management  Planned therapy interventions: manual therapy, neuromuscular re-education, therapeutic activities, therapeutic exercise, gait training and home exercise program  Frequency: 2x week  Duration in visits: 8  Duration in weeks: 4  Plan of Care beginning date: 5/3/2021  Plan of Care expiration date: 6/2/2021  Treatment plan discussed with: patient and PTA        Subjective Evaluation    History of Present Illness  Mechanism of injury: UPDATE:  Patient reports she is doing better  Able to walk in the store to do grocery shopping, however, does note she was shuffling her feet by the end of the trip  Very fatigued and needed to take 3 naps yesterday  Per EMR:  Patient with diagnosed COVID-19 in January of 2021  Occasional low HR while exercising (HR was appropriate while walking on the treadmill - 's; however, dropped to 48 and patient had subsequent chest discomfort over the left pectoral muscle, radiating to left shoulder and down left arm to her hand, this made her tired and she took a 2 hour nap  Positive for shortness of breath, chest discomfort, intermittent dizziness, blurred vision/fogginess, achy joints, migraines, and red rash on her face intermittently  Of note:  Limited endurance, limited ambulation (1-2 minutes at times), decreasd driving tolerance (30 mins), attention to task (20 mins), dizziness/balance deficits  Sensation dizziness with low heart rate, clammy  Migraines  SOB and chest pain with negotiation of stairs  Burning with increased activity    Quality of life: fair    Pain  Location: Joint pain with activity - improving since SOC  Aggravating factors: walking, standing, stair climbing and lifting  Progression: no change    Social Support    Employment status: working    Diagnostic Tests    FCE comments: CHEST      INDICATION:  U07 1: COVID-19      COMPARISON:  None     EXAM PERFORMED/VIEWS:  XR CHEST PA & LATERAL     FINDINGS:     Cardiomediastinal silhouette appears unremarkable      Tiny focus of linear atelectasis or scarring left costophrenic angle  The lungs are otherwise clear        No pneumothorax or pleural effusion      Osseous structures appear within normal limits for patient age      IMPRESSION:     No acute cardiopulmonary disease  Treatments  Current treatment: physical therapy, speech therapy and occupational therapy  Patient Goals  Patient goals for therapy: increased strength, independence with ADLs/IADLs, return to sport/leisure activities, return to work and decreased pain          Objective     Yellow flag question:   Were you in the ICU? - NO  Were you on a ventilator? - NO    Do you feel that your voice has changed? Are you having difficulty with swallowing?- NO    Difficulty with participation in ADL and IADL - YES    Breathing Difficulty? -   Has pulmonary function test been completed? - NO    Patient Specific Functional Scale score: completed with FOTO         Objective:      Functional Outcome Measures:    Balance : At eval:  5x Sit to stand:  18 3 sec  FGA  Score:  11/30     At re-eval:  5x Sit to stand:  18 3 sec  FGA  Score:  11/30     Additional Screening Based on Yellow flag or if determined appropriate:   PHQ-9:  See flowsheet, medication in place    TM training - progressing steadily  10 mins max time at 1 4 and 0% incline  Progressing to inclines as tolerated  Trial of elliptical - 2 mins with HR 89, 95% on RA  Total distance 0 52 miles in 23 mins paced as tolerated - seated rest x 2 to accomplish  Vitals stable    HR 75-89 bpm, SpO2 on RA 95-97%    Dynamic Gait Index (DGI)/Functional Gait Assessment (FGA)    1   Gait Level Surface    3) Normal-walks 20 feet in less than 5 5 seconds, no AD, good speed, no evidence for imbalance, normal gait pattern, deviates no more than 6 inches outside of the 12 inch wide walkway  2   Change in Gait Speed  2)  Mild impairment-is able to change speed but demonstrates mild gait deviations, deviates 6-10 inches outside of 12 inch wide walkway, or no gait deviations, but unable to achieve a significant change in velocity, or uses an AD  3   Gait with horizontal head turns  2)  Mild impairment - Performs head turns smoothly with slight change in gait velocity, smooth gait path, deviates 6-10 inches outside of 12 inch wide walkway  Uses AD  4   Gait with vertical head turns  2)  Mild impairment - Performs head turns smoothly with slight change in gait velocity, smooth gait path, deviates 6-10 inches outside of 12 inch wide walkway  Uses AD  5   Gait and Pivot Turn  3)  Normal - pivot turns safely within 3 seconds and stops quickly with no loss of balance    6  Step over Obstacles  2)  Mild impairment - is able to step over a 4 5 inch obstacle without changing gait speed, no evidence of imbalance  7   Gait with NBOS   3)  Normal - is able to ambulate for 10 steps heel to toe with no staggering     8  Gait with Eyes Closed  2)  Mild impairment - Walks 20 feet, uses AD, slower speed, mild gait deviations, deviates 6-10 inches outside of 12 inch wide walkway  Ambulates 20 feet in less than 9 seconds but greater than 7 seconds  9   Ambulating Backwards  2)  Mild impairment - walks 20 feet, uses AD, slower speed, mild gait deviations, deviates 6-10 inches outside 12 inch walkway  10   Steps  2)  Mild impairment - alternating feet, must use rail     TOTAL SCORE: ____23_______/ maximum score of 30    TU seconds without AD  TUG - co seconds (backward by 3 from 47)  Breathing evaluation:     Describe breathing: via: room air  Breathing pattern:  WNL  Capillary refill: 2 seconds  Clubbing: absent  Nasal flaring: absent         Rib cage expansion/Chest wall mobility    Supine normal breathe (tidal)/supine deep breathe(vital capacity) Seated normal breathe (tidal)/supine deep breathe (vital capacity) Standing normal breathe (tidal)/supine deep breathe (vital capacity)   Axilla Age appropriate Age appropriate Age appropriate   xiphoid Age appropriate Age appropriate Age appropriate   Half way between xiphoid and umbilicus Age appropriate Age appropriate Age appropriate         Is chest wall expansion symmetrical? Y

## 2021-05-03 NOTE — PROGRESS NOTES
OT Re-Evaluation     Today's date: 5/3/2021  Patient name: Atilano Habermann  : 1980  MRN: [id number]  Referring provider: GABRIELA Mauricio  Dx:   Encounter Diagnosis     ICD-10-CM    1  COVID-19 virus detected  U07 1                   Assessment  Assessment details: Patient presenting to OP OT services with a dx of post COVID-19  Patient reports initially begin dx with COVID-19 on 2020  Patient experienced fevers and coughing for 11 days  Patient was not hospitalized  Patient reports entire family experienced COVID symptoms  Patient reports coughing began to improve, but continued to exerpience endurance deficits  Patient reports difficulty with navigating stairs due to fatigue  Patient reports difficulty with daily ADLs and morning routines secondary to fatigue as well  Patient reports exhaustion varies based on intensity of exercises  Patient reports in general she begins to experience fatigue after thirty minutes of light activity  Patient is currently working as a  for a pharmacy  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 in which she was reporting an increased in fatigue during work duties  Patient also noted "brain fog" when completing job tasks on a computer  Patient had chest x-ray two days ago and was told she had a "sluggish" lung  Patient recent saw Cardiology and had a Holter monitor placed two weeks ago for 2 days  Patient has an EKG at the beginning of march  Patient had recent blood work completed without any abnormalities  Patient has an Echo scheduled for 2021  Patient will follow-up with Cardiology after Echo  Patient has follow-up in  PCP  Patient is right hand dominant  Patient stated the symptoms she experienced due to COVID included chest pain with radiating sensations into left arm, fever, coughing, loss of smell and taste  Patient reports occasional instance where taste is different, such as coffee at this time   Patient reports coughing improved since the end of January  Pt is now being referred to for post Covid evaulation  As assessed, patient will benefit from PT and SLP evaluation  spO2 99% 41 HR  Patient presenting with WNL breathing patterns during evaluation  When patient attempted light cardio activity, breathing patterns increased with nasal flaring and labored breathing  Patient also noted lightheadedness during activity  O2 saturations did not change with change in position  spO2 decreased to 91% with activity  migraines ocular  Last two weeks ago  Re-assessment completed on 5/3/2021  Patient has follow-up appoint on 6/3/2021 with PCP  Patient has consistently attended OT, PT, Speech therapy since start of care  Patient reports feeling like she is 50% better with physical and cognitive demands as compared to initial evaluation  Patient reports having most difficulty with cognitive tasks  Physically, patient feels like her endurance has improved  She is now able to do laundry at home and go grocery shopping without relying on a cart  Patient noted she still needs to take breaks when completing activities of daily living but feels like she has made progress  She continues to focus on her breathing during activity and has maintained a consistent spO2 and HR throughout sessions  Patient is still experiencing tingling in hands and fingers following activity, but reports chest pain is minimal to none  SLUMS  1/1  1/1  1/1  1/3  3/3  5/5  2/2  4/4  2/2  8/8  28/30 Normal    Please refer to PT assessment for TUG, TUG cog and 5x sit to stand  Impairments: abnormal coordination, abnormal or restricted ROM and impaired physical strength  Other impairment: decreased functional use  Barriers to therapy: Past Medical History:  01/2021: COVID-19  No date: Disease of thyroid gland  No date: Hypertension  Understanding of Dx/Px/POC: good   Prognosis: good    Goals  STGs    Pt will increase  strength by 5-10#   - Progressing    Pt will increase shoulder strength by 1/2 strength grade - Not Met    Pt will demonstrate improved activity tolerance to 10 minutes without rest breaks or fatigue - Progressing    Pt will demonstrate consistent spO2 during session of 90% and above - Met    Independent with HEP      LTGs     Pt will increase  strength by an additional 5-10#  - Not Met    Pt will increase shoulder strength by 1-2 strength grade  - Not Met    Pt will demonstrate improved activity tolerance to 20 minutes without rest breaks or fatigue - Not Met    Pt will report an increase in ADL/IADL participation - Partially Met          Plan  Plan details: Patient presenting to OP OT services with a dx of COVID-19  Patient still presenting with decreased strength and endurance during activity  Patient will benefit from continued OT services to work towards established goals  Sessions will focus on improving activity tolerance and strength to increase participation in ADLs/IADLs  Recommended OT services 2x/week for 4 weeks    Patient would benefit from: OT eval and skilled occupational therapy  Referral necessary: Yes  Planned therapy interventions: massage, manual therapy, joint mobilization, patient education, strengthening, stretching, fine motor coordination training, home exercise program, neuromuscular re-education, self care, therapeutic exercise, therapeutic activities, IADL retraining, ADL retraining, breathing training, coordination, graded activity and graded exercise  Frequency: 2x week  Duration in visits: 8  Duration in weeks: 4  Treatment plan discussed with: patient        Subjective Evaluation    History of Present Illness  Mechanism of injury: COVID-19          Not a recurrent problem   Quality of life: good    Pain  Current pain ratin  At best pain ratin  At worst pain ratin  Location: Chest Pain  Quality: radiating and sharp  Relieving factors: rest  Aggravating factors: overhead activity, walking, stair climbing and lifting    Social Support  Steps to enter house: yes  Stairs in house: yes (3 JUHI with HR)   3  Lives in: multiple-level home  Lives with: spouse, parents and significant other    Employment status: working  Hand dominance: right  Exercise comments: Oct Treadmil  Diagnostic Tests  X-ray: abnormal  Treatments  Current treatment: occupational therapy  Patient Goals  Patient goals for therapy: decreased pain, return to sport/leisure activities, independence with ADLs/IADLs, increased strength and increased motion  Patient goal: Increase endurance        Objective     Neurological Testing     Sensation     Shoulder   Left Shoulder   Diminished: light touch    Right Shoulder   Intact: light touch    Wrist/Hand   Left   Diminished: light touch    Right   Intact: light touch    Additional Neurological Details  Patient reports radiating sensation deficits into left hand  Patient reports increased with activity  Patient reports still having tingling sensation in hands at times      Active Range of Motion   Left Shoulder   Normal active range of motion    Right Shoulder   Normal active range of motion    Left Elbow   Normal active range of motion    Right Elbow   Normal active range of motion    Left Wrist   Normal active range of motion    Right Wrist   Normal active range of motion    Left Thumb   Opposition: WNL    Right Thumb   Opposition: WNL    Additional Active Range of Motion Details  Composite fist    Strength/Myotome Testing     Left Shoulder     Planes of Motion   Flexion: 3+   Extension: 4-   Abduction: 3+   Adduction: 4-     Right Shoulder     Planes of Motion   Flexion: 3+   Extension: 4-   Abduction: 3+   Adduction: 4-     Left Elbow   Flexion: 4-  Extension: 4-  Forearm supination: 4-  Forearm pronation: 4-    Right Elbow   Flexion: 4-  Extension: 4-  Forearm supination: 4-  Forearm pronation: 4-    Left Wrist/Hand   Wrist extension: 4-  Wrist flexion: 4-  Radial deviation: 4-  Ulnar deviation: 4-     (2nd hand position)     Trial 1: 50    Right Wrist/Hand   Wrist extension: 4-  Wrist flexion: 4-  Radial deviation: 4-  Ulnar deviation: 4-     (2nd hand position)     Trial 1: 58    Additional Strength Details  Radiating pain with MMT testing  Neuro Exam:     Coordination   Heel to shin: left WNL and right WNL  Finger to nose: left WNL and right WNL  Rapid alternating movements: UE WNL and LE impaired     Functional outcomes   Left 9 peg hole test: 22 79 (seconds)  Right 9 hole peg test: 22 10 (seconds)  Functional outcome comment: Decreased FMC as compared to right hand                   Vitals 04/22/2021 04/27/2021 04/29/2021 05/03/2021    Pre Treatment SpO2 - 98%  HR - 74 98%  71HR Unable to obtain 94% 71HR    During Treatment SpO2 - 98%  HR - 75 SpO2 - 98%  HR - 72 Unable to obtain 98% 71HR    Post Treatment SpO2 - 98%  HR - 74 SpO2 - 97%  HR - 82 Unable to obtain 97% 67HR             Neuro Re-Ed                                                                        Ther Ex 04/22/2021 04/27/2021 04/29/2021 05/03/2021    Thera-Band  Sh Ext       Triceps Ext       Retraction         Biceps Curl    99%/74HR  Yellow  X 20 99%/75HR  X 20   98%/75HR    X 20  99%/64HR      X 20 98%/72HR Red  X 30  98% 76HR    X 15  98% 81HR    X 20  98% 77HR      X 20  97% 78HR      Digi-Flex Green x 20 Green x 20 + around the world  6/8 Green x 20 Blue x 20    UBE Machine 98%/72HR  2:08 then 2:00 break  98%/76HR  2:22 then 1:20 break  98%/75HR  2:50 then 1:00 break  98%/70HR  2:  w/ divided attention task 18/20 questions  4:00 98% 77HR  Break 1:00  4:00 98% 72HR   Break 1:30  2:00 % 98% 81HR  w/ 21 questions 3:00 then   2 Min break  4:50 then  1:50 Min break  2:10 complete     w/ word scramble  94% 71HR 5:00 then 2 Min break - 14 words  98% 71HR  5:00 complete - 10 words  98% 71HR      Nu Step        Thera-Ball  Forward Flex    Rotation    Diagonal Yellow  X 20 98%/80HR  X 20  98%/74HR Yellow  X 15  98% 75HR  X 10  97% 82HR                                   Ther Activity 04/22/2021 04/27/2021 04/29/2021 05/03/2021    Grooved Peg Board R - 1:35 37  L - 1:35 89  R - 1:25:77  L - 1:28:08     Tension Pin Pom Pom Green - around the world 6/8 Green - all the R R  [place] - around the world 8/8  Maria M Console - around the world 5/8    [employer] Peg Board        Brain Box w/ [employer] Activity        Laundry Management        Dynavision  A      A Endurance  B - 50% Green          A - Addition  98% 76HR  49/1 22  98% 74HR      18/18/1 51 R  19/19/1 66L  98% 69HR      2 misses  37/1 62  98% 69HR  W/ Balance pad   41/1 46  97% 68HR    20/20/1 56/R  18/18/1 53L  97% 67HR          Tension Pin on Pole        Large Peg Board        Black Peg Board Tweezer  X 10 L  X 10 R Tweezer  X 10 L  X 10 L   Tweezer  X 15 L  X 15 R Tweezer   X 15 L  X 15 R    Multi-Matrix Super Saccades   Completed 33                      Modalities 04/22/2021 04/27/2021 04/29/2021 05/03/2021                                                   Documentation and treatment completed this session by BETTY Salazar under the direct supervision of Francisco Javier Paz MS, OTR/L

## 2021-05-04 ENCOUNTER — OFFICE VISIT (OUTPATIENT)
Dept: SPEECH THERAPY | Facility: CLINIC | Age: 41
End: 2021-05-04
Payer: COMMERCIAL

## 2021-05-04 DIAGNOSIS — U09.9 COVID-19 LONG HAULER: Primary | ICD-10-CM

## 2021-05-04 DIAGNOSIS — U07.1 COVID-19 VIRUS DETECTED: Primary | ICD-10-CM

## 2021-05-04 PROCEDURE — 92507 TX SP LANG VOICE COMM INDIV: CPT

## 2021-05-04 NOTE — PROGRESS NOTES
Speech-Language Pathology Treatment Note    Today's date: 2021  Patient name: Denver Lurie  : 1980  MRN: [id number]  Referring provider: GABRIELA Aguirre  Dx:   Encounter Diagnosis     ICD-10-CM    1  COVID-19 virus detected  U07 1      Medical History significant for:   Past Medical History:   Diagnosis Date    COVID-19 2021    Disease of thyroid gland     Hypertension      Flowsheet:  Start Time: 08  Stop Time: 09  Total time in clinic (min): 60 minutes    Subjective:  Patient reports endurance is slowly increasing as well as strength  S/p COVID vaccine no more lingering symptoms, no headache  Eye twitch as well as leg twitch will come and go overly fatigued or increased stress  She also got herself a calendar and organized her thoughts/appts/dates  Patient has implemented a note taking system for work which is working  Verbalization strategy is also helping patient  Friday - no therapy services, able to stay focused for up to a "few hours" followed by a break  Please see assessment  Objective:  1  Pt will complete mental manipulation tasks of up to 4 words and 4 numbers @ 80% accuracy min-mod cue   :  FWD @ 100% acc i'ly, BWD @ 100%, ABC @ 100%     2  Patient will complete deductive reasoning problems with increased complexity and decreased prompt from min cue to independently @ 100% acc over 4 weeks  3   Pt will answer "wh" questions in response to paragraphs read to self and read outloud by clinician @ 80% ketan  4   Pt will complete high level multi-tier deductive reasoning puzzles with no more than 2 errors per puzzle   completed puzzle number 5, 5x4 with 11 clues completed @ 100% min cue      5   Patient will complete rapid naming tasks with familliar pictures increasing processing speed throughout  :  Named "50+" items in 5 minutes  6   Patient will name atleast 10 items in an abstract category independently in less than 2 minutes    : Presidents 16 (1 minute), kahlil stones 9, things that reflect light 6  4/26:  Bodies of water 13, furniture 11, occupations 20, trees 10    7  Patient will complete various sustained and divided attention tasks for 2+ minutes while attending to auditory/visual stimuli with task average decreasing closer to the average range  4/21 blink cards sorting color/shape 3:57 no errors  ( utilized visual cue and self verbalization cueing to remind self of category- this helped keep her attention and reduce uncontrollable laughter)  Sort color/shape/number 2:38 no errors ( visual cue + verbalization cue)  4/28 during IM 2 min task, patient OOB and rest needed due to endurance between reps  Standing and switching hand positioning  Date Task type Task Average  SRO Early  Late  Variance    4/21 Short form task 1  Task 2  2 min visual BH 38   98  45 12  4  17 38  98  83 62  2  17 45  68  41   4/28 BH 2 min vis  " 39  27 86  2 01  56 16  76 31  67                                                  8   Pt will utilize atleast 2 word finding strategies in conversation in and out of session ketan  4/19: Word finding strategies provided  4/26:  "Hit or miss" pt reports drafting emails and not sending them  WF in session x2 SC  5/4:  No WF in session today  9   Pt will utilize atleast 2 memory strategies in structured 3 word, # or picture immediate memory recall tasks with carryover at home  4/19:  Memory strategies provided  4/21 verbalization strategy taught in session and demonstrated effectiveness! 4/26:  Drafting emails and not sending them (attention)  5/4:  Pt reports creating a grocery store list and navigating the store by category until patient misplaced her list      Long Term Goals:  1  Pt will increase cognitive linguistic skills across all settings  2  Pt will increase physical and mental endurance across all settings       Assessment:  Patient and  present today, extensive counseling and education provided regarding Covid-19 therapy  Patient and clinician discussing work options including pursuing STD or LTD through her employer as patient is not able to fully commit to work or recovery simultaneously  They will discuss with their PCP going forward  She was encouraged to make lists of items that her and her  need to discuss as well as problem solving at home  Pt recommended that she speak to her employer regarding task delegation as patient is ~1 month behind      Recommendations:  -Patient would benefit from outpatient skilled Speech Therapy services : Cognitive-Linguistic therapy   Recommend neurology consult as well as optometry    -Frequency: 2x weekly  -Duration: 4-6 weeks    -Intervention certification from: 7/31/3450  -Intervention certification to: 3/89/80    -Intervention comments: puzzles, interactive metronome, memory/word finding strategies    Visit: 6

## 2021-05-05 ENCOUNTER — APPOINTMENT (OUTPATIENT)
Dept: OCCUPATIONAL THERAPY | Facility: CLINIC | Age: 41
End: 2021-05-05
Payer: COMMERCIAL

## 2021-05-05 ENCOUNTER — OFFICE VISIT (OUTPATIENT)
Dept: SPEECH THERAPY | Facility: CLINIC | Age: 41
End: 2021-05-05
Payer: COMMERCIAL

## 2021-05-05 ENCOUNTER — APPOINTMENT (OUTPATIENT)
Dept: PHYSICAL THERAPY | Facility: CLINIC | Age: 41
End: 2021-05-05
Payer: COMMERCIAL

## 2021-05-05 ENCOUNTER — APPOINTMENT (OUTPATIENT)
Dept: SPEECH THERAPY | Facility: CLINIC | Age: 41
End: 2021-05-05
Payer: COMMERCIAL

## 2021-05-05 DIAGNOSIS — R41.841 COGNITIVE COMMUNICATION DEFICIT: ICD-10-CM

## 2021-05-05 DIAGNOSIS — U07.1 COVID-19 VIRUS DETECTED: Primary | ICD-10-CM

## 2021-05-05 PROCEDURE — 92507 TX SP LANG VOICE COMM INDIV: CPT | Performed by: NURSE PRACTITIONER

## 2021-05-05 NOTE — PROGRESS NOTES
Speech-Language Pathology Treatment Note    Today's date: 2021  Patient name: Tanja Corral  : 1980  MRN: [id number]  Referring provider: GABRIELA Quintana  Dx:   Encounter Diagnosis     ICD-10-CM    1  COVID-19 virus detected  U07 1    2  Cognitive communication deficit  R41 [id number]      Medical History significant for:   Past Medical History:   Diagnosis Date    COVID-19 2021    Disease of thyroid gland     Hypertension      Flowsheet:  Start Time: 800  Stop Time: 900  Total time in clinic (min): 60 minutes    Subjective:  Patient arrived on time to session  Strategies:  List making via categorization was helpful  ( work towards concrete categories on a list vs  Abstract categories)  Work towards possibly making electronic lists to avoid losing the paper lists as previously done this past weekend   making lists to discuss with patient  Objective:  1  Pt will complete mental manipulation tasks of up to 4 words and 4 numbers @ 80% accuracy min-mod cue   :  FWD @ 100% acc i'ly, BWD @ 100%, ABC @ 100%  rescramble 5 words into sentences 100% ketan, 4 words ranking 1/3 trials ketan ( verbalization) -- discontinued task following third trial     2  Patient will complete deductive reasoning problems with increased complexity and decreased prompt from min cue to independently @ 100% acc over 4 weeks  3   Pt will answer "wh" questions in response to paragraphs read to self and read outloud by clinician @ 80% ketan  4   Pt will complete high level multi-tier deductive reasoning puzzles with no more than 2 errors per puzzle   completed puzzle number 5, 5x4 with 11 clues completed @ 100% min cue      5   Patient will complete rapid naming tasks with familliar pictures increasing processing speed throughout  :  Named "50+" items in 5 minutes  6   Patient will name atleast 10 items in an abstract category independently in less than 2 minutes    :  Presidents 16 (1 minute), kahlil stones 9, things that reflect light 6  4/26:  Bodies of water 13, furniture 11, occupations 20, trees 10 5/5 cold: 1:10 named 10 items with min cue  Hot: 12 items in 1 min  Yellow: 10 items in 1 min  (Teaching subcategories was helpful for her )     7  Patient will complete various sustained and divided attention tasks for 2+ minutes while attending to auditory/visual stimuli with task average decreasing closer to the average range  4/21 blink cards sorting color/shape 3:57 no errors  ( utilized visual cue and self verbalization cueing to remind self of category- this helped keep her attention and reduce uncontrollable laughter)  Sort color/shape/number 2:38 no errors ( visual cue + verbalization cue)  4/28 during IM 2 min task, patient OOB and rest needed due to endurance between reps  Standing and switching hand positioning  Date Task type Task Average  SRO Early  Late  Variance    4/21 Short form task 1  Task 2  2 min visual BH 38   98  45 12  4  17 38  98  83 62  2  17 45  68  41   4/28 BH 2 min vis  " 99  96 75  1 63  50 07  46 84  63                                                  8   Pt will utilize atleast 2 word finding strategies in conversation in and out of session ketan  4/19: Word finding strategies provided  4/26:  "Hit or miss" pt reports drafting emails and not sending them  WF in session x2 SC  5/4:  No WF in session today  5/5 WF x1 with picture naming ( fire extinguisher)     9  Pt will utilize atleast 2 memory strategies in structured 3 word, # or picture immediate memory recall tasks with carryover at home  4/19:  Memory strategies provided  4/21 verbalization strategy taught in session and demonstrated effectiveness! 4/26:  Drafting emails and not sending them (attention)    5/4:  Pt reports creating a grocery store list and navigating the store by category until patient misplaced her list  5/5 4 pics recall 100% trials ketan ( verbalization/rehearsalx1-2), Long Term Goals:  1  Pt will increase cognitive linguistic skills across all settings  2  Pt will increase physical and mental endurance across all settings  Assessment:  Patient reporting pressure in back of head -- following mental manipulation tasks, discontinued activity  Pressure in head, not increased but moved from back of your head to top of her head by the end of the session           Recommendations:  -Patient would benefit from outpatient skilled Speech Therapy services : Cognitive-Linguistic therapy   Recommend neurology consult as well as optometry    -Frequency: 2x weekly  -Duration: 4-6 weeks    -Intervention certification from: 0/63/0350  -Intervention certification to: 4/60/07    -Intervention comments: puzzles, interactive metronome, memory/word finding strategies    Visit: 7

## 2021-05-05 NOTE — PROGRESS NOTES
Daily Note     Today's date: 2021  Patient name: Belinda Marin  : 1980  MRN: [id number]  Referring provider: GABRIELA North  Dx:   Encounter Diagnosis     ICD-10-CM    1  COVID-19 virus detected  U07 1    2  Weakness of both lower extremities  R29 898    3  Decreased functional mobility and endurance  Z74 09                   Subjective: I have been able to get down stairs and do my TM for about 10 min break and another 10 min  Bl LE get fatigue and feel unsteady      Objective: See treatment diary below      Assessment: Tolerated treatment well  Patient demonstrated fatigue post treatment      Plan: Continue per plan of care            Precautions: falls, monitor vitals  Re-eval Date: 2021     Date 4/27 4/29 5/3 5/6    Visit Count 6 7 8 9    FOTO completed  See RE     Pain In Left flank  See RE     Pain Out same  See RE         Manuals                                        Neuro Re-Ed        Star Drill        [place]        Core stability Sit/disc  AH 10x 5"  AH w/chest press (red wt ball) 10x  AH with Alt LE 10x  AH with alt UE 10x Sit/disc  [place]    MTP/LTP  10#  20x    Antitrunk      Multidirectional board  6#  20x         Balance tasks  TUG  Foam  Incline  EO/EC  FGA                     Ther Ex HR 70-80s   SpO2  97-99%  HR 70-80  SpO2  98% on RA HR 75-89  SpO2 95-99% on RA HR 75-89  SpO2 95-99%     Aerobic Nustep  L2  4 min x2  SPM 50-60    TM  6 mins at 0%  0 5-1 0 rpms    RPE: 1-1NI-Ddotsnf bike  4 mins AROM  4 mins AROM  2 mins AROM    RPE: 2-3     TM  10 mins  0%  1 0-1 4 mph    RPE  1-2/10    5% @ 1 0  3 mins  3% @ 1 0  5 mins  0% at 1 0  2 mins    REP: 2-4 Elliptical  2 min  AROM  REP 5    TM  10 mins  0%  1 4 mph    7 mins  0%  1 4 mph    4 mins  0%  1 4 mph    2 mins  0%  0 7-1 0    RPE 2-4 TM  10 mins  0%  1 4 mph    TM  7 min  0%  1 5 mph    TM  5 min  0%  1 5 mph  RPE 2-4  2 min rest    Elliptical  3 min  AROM  RPM 30-40    RPE: 5      SLR x 4 HEP       HR/TR HEP       Mini Squats Resume NV       Knee flex/ext mach 11#  3x10  RPE 2-3    22#  3x10  RPE 2-3 22#  3x10  RPE 2-3    33#  2x10  RPE 2-3    22#  3x10  RPE 2-3    33#  3x10  RPE 2-3    Leg Press 30#  40x  RPE 2 90#  1x10  75#  1x10  60#  1x10  HR/TR  50#  3x10      Prayer stretch Resume upcoming  To tolerance  Fwd  And to right       LTR NP/ Resume upcoming  Seated  To tolerance  Cues for deep breathing  2x ea side       Ther Activity                        Gait Training         **NV               Modalities

## 2021-05-06 ENCOUNTER — OFFICE VISIT (OUTPATIENT)
Dept: PHYSICAL THERAPY | Facility: CLINIC | Age: 41
End: 2021-05-06
Payer: COMMERCIAL

## 2021-05-06 ENCOUNTER — OFFICE VISIT (OUTPATIENT)
Dept: OCCUPATIONAL THERAPY | Facility: CLINIC | Age: 41
End: 2021-05-06
Payer: COMMERCIAL

## 2021-05-06 DIAGNOSIS — R29.898 WEAKNESS OF BOTH LOWER EXTREMITIES: ICD-10-CM

## 2021-05-06 DIAGNOSIS — Z74.09 DECREASED FUNCTIONAL MOBILITY AND ENDURANCE: ICD-10-CM

## 2021-05-06 DIAGNOSIS — U07.1 COVID-19 VIRUS DETECTED: Primary | ICD-10-CM

## 2021-05-06 PROCEDURE — 97530 THERAPEUTIC ACTIVITIES: CPT

## 2021-05-06 PROCEDURE — 97110 THERAPEUTIC EXERCISES: CPT

## 2021-05-06 NOTE — PROGRESS NOTES
Daily Note     Today's date: 2021  Patient name: Hayden Concepcion  : 1980  MRN: [id number]  Referring provider: GABRIELA Lobo  Dx:   Encounter Diagnosis     ICD-10-CM    1  COVID-19 virus detected  U07 1                   Subjective: "I dont have knuckles, my hands are swollen "      Objective: See treatment diary below      Assessment: Tolerated treatment well  Patient exhibited good technique with therapeutic exercises and would benefit from continued OT  Patient tolerated additional Thera-ball exercises this session  As increase in HR following ball exercises was noted  Patient completed recipe following task in kitchen to focus on standing endurance and cognitive demands  Patient was able to bend at waist and cross midline for item retreival while following multi-step directions to complete tasks in the kitchen  Patient was independent with following directions and was able to stand for entire task  Patient mentioned swelling in her hands which becomes worse at the end of the day  Therapist provided and educated patient on edema glove to wear at night to help reduce swelling in her hands  Patients endurance on UBE has improved and therapist observed patient's breaks/sitting between tasks are less frequent as compared to prior sessions  Plan: Continue per plan of care  Progress treatment as tolerated               Vitals 2021   Pre Treatment SpO2 - 98%  HR - 74 98%  71HR Unable to obtain 94% 71HR 96% 73HR   During Treatment SpO2 - 98%  HR - 75 SpO2 - 98%  HR - 72 Unable to obtain 98% 71HR 97% 84HR   Post Treatment SpO2 - 98%  HR - 74 SpO2 - 97%  HR - 82 Unable to obtain 97% 67HR 99% 78HR            Neuro Re-Ed                                                                        Ther Ex 2021   Thera-Band  Sh Ext       Triceps Ext       Retraction         Biceps Curl    99%/74HR  Yellow  X 20 99%/75HR  X 20   98%/75HR    X 20  99%/64HR      X 20 98%/72HR Red  X 30  98% 76HR    X 15  98% 81HR    X 20  98% 77HR      X 20  97% 78HR      Digi-Flex Green x 20 Green x 20 + around the world  6/8 Green x 20 Blue x 20 Blue x 20   UBE Machine 98%/72HR  2:08 then 2:00 break  98%/76HR  2:22 then 1:20 break  98%/75HR  2:50 then 1:00 break  98%/70HR  2:  w/ divided attention task 18/20 questions  4:00 98% 77HR  Break 1:00  4:00 98% 72HR   Break 1:30  2:00 % 98% 81HR  w/ 21 questions 3:00 then   2 Min break  4:50 then  1:50 Min break  2:10 complete     w/ word scramble  94% 71HR 5:00 then 2 Min break - 14 words  98% 71HR  5:00 complete - 10 words  98% 71HR   W/ alphabet game  5:11 98% 77HR Break 1:45  5:30 98% 78HR   Nu Step        Thera-Ball  Forward Flex    Rotation    Diagonal    Step/Bounce Yellow  X 20 98%/80HR  X 20  98%/74HR Yellow  X 15  98% 75HR  X 10  97% 82HR     Yellow  X 20    X 20  97% 84HR      L/R x 10  99% 78HR                              Ther Activity 04/22/2021 04/27/2021 04/29/2021 05/03/2021 05/06/2021   Grooved Peg Board R - 1:35 37  L - 1:35 89  R - 1:25:77  L - 1:28:08  R - 1:23:96  L - 1:31:01   Tension Pin Pom Pom Green - around the world 6/8 Green - all the R R  [place] - around the world 8/8  [facility] - [place] the [place] 5/8 Red - around the world 8/8   Purdue Peg Board        Brain Box w/ [facility] Activity        Laundry Management        Dynavision  A      A Endurance  B - 50% Green          A - Addition  98% 76HR  49/1 22  98% 74HR      18/18/1 51 R  19/19/1 66L  98% 69HR      2 misses  37/1 62  98% 69HR  W/ Balance pad   41/1 46  97% 68HR    20/20/1 56/R  18/18/1 53L  97% 67HR          Tension Pin on Pole        Large Peg Board        Black Peg Board Tweezer  X 10 L  X 10 R Tweezer  X 10 L  X 10 L   Tweezer  X 15 L  X 15 R Tweezer   X 15 L  X 15 R Tweezer  X 15  X 15   Multi-Matrix Super Saccades   Completed 33     Standing kitchen recipe following     Performed - 10 step directions     Modalities 04/22/2021 04/27/2021 04/29/2021 05/03/2021 05/06/2021                                                 Documentation and treatment completed this session by BETTY Park under the direct supervision of [facility] MS, OTR/L

## 2021-05-10 ENCOUNTER — OFFICE VISIT (OUTPATIENT)
Dept: SPEECH THERAPY | Facility: CLINIC | Age: 41
End: 2021-05-10
Payer: COMMERCIAL

## 2021-05-10 DIAGNOSIS — U07.1 COVID-19 VIRUS DETECTED: Primary | ICD-10-CM

## 2021-05-10 PROCEDURE — 92507 TX SP LANG VOICE COMM INDIV: CPT

## 2021-05-10 NOTE — PROGRESS NOTES
Speech-Language Pathology Treatment Note    Today's date: 5/10/2021  Patient name: Loletta Koyanagi  : 1980  MRN: [id number]  Referring provider: GABRIELA Martínez  Dx:   Encounter Diagnosis     ICD-10-CM    1  COVID-19 virus detected  U07 1      Medical History significant for:   Past Medical History:   Diagnosis Date    COVID-19 2021    Disease of thyroid gland     Hypertension      Flowsheet:  Start Time: 0900  Stop Time: 1000  Total time in clinic (min): 60 minutes    Subjective:  Patient arrived on time to session  She reports that her hands have begun swelling as recently as last week (varying in severity) with tremors at rest in closed fist position or bringing fingers toward one another  Denies any noticeable intention tremor at this time, OT is aware  Strategies:  List making via categorization was helpful  ( work towards concrete categories on a list vs  Abstract categories)  Work towards possibly making electronic lists to avoid losing the paper lists as previously done this past weekend   making lists to discuss with patient  Objective:  1  Pt will complete mental manipulation tasks of up to 4 words and 4 numbers @ 80% accuracy min-mod cue   :  FWD @ 100% acc i'ly, BWD @ 100%, ABC @ 100%   rescramble 5 words into sentences 100% ketan, 4 words ranking 1/3 trials ketan ( verbalization) -- discontinued task following third trial     2  Patient will complete deductive reasoning problems with increased complexity and decreased prompt from min cue to independently @ 100% acc over 4 weeks  Goal advanced below, see goal #4  3   Pt will answer "wh" questions in response to paragraphs read to self and read outloud by clinician @ 80% ketan  4   Pt will complete high level multi-tier deductive reasoning puzzles with no more than 2 errors per puzzle     completed puzzle number 5, 5x4 with 11 clues completed @ 100% min cue   5/10:   ,  closet, dog breed, couples, house, puzzles 1-6 from [facility] 5 all @ 100% acc i'ly  5   Patient will complete rapid naming tasks with familliar pictures increasing processing speed throughout  4/26:  Named "50+" items in 5 minutes  6   Patient will name atleast 10 items in an abstract category independently in less than 2 minutes  4/19:  Presidents 16 (1 minute), kahlil stones 9, things that reflect light 6  4/26:  Bodies of water 13, furniture 11, occupations 20, trees 10 5/5 cold: 1:10 named 10 items with min cue  Hot: 12 items in 1 min  Yellow: 10 items in 1 min  (Teaching subcategories was helpful for her )     7  Patient will complete various sustained and divided attention tasks for 2+ minutes while attending to auditory/visual stimuli with task average decreasing closer to the average range  4/21 blink cards sorting color/shape 3:57 no errors  ( utilized visual cue and self verbalization cueing to remind self of category- this helped keep her attention and reduce uncontrollable laughter)  Sort color/shape/number 2:38 no errors ( visual cue + verbalization cue)  4/28 during IM 2 min task, patient OOB and rest needed due to endurance between reps  Standing and switching hand positioning  Date Task type Task Average  SRO Early  Late  Variance    4/21 Short form task 1  Task 2  2 min visual BH 38   98  45 12  4  17 38  98  83 62  2  17 45  68  41   4/28 BH 2 min vis  " 01  34 88  1 81  75 54  45 04  77                                                  8   Pt will utilize atleast 2 word finding strategies in conversation in and out of session ketan  4/19: Word finding strategies provided  4/26:  "Hit or miss" pt reports drafting emails and not sending them  WF in session x2 SC  5/4:  No WF in session today  5/5 WF x1 with picture naming ( fire extinguisher)     9  Pt will utilize atleast 2 memory strategies in structured 3 word, # or picture immediate memory recall tasks with carryover at home    4/19:  Memory strategies provided  4/21 verbalization strategy taught in session and demonstrated effectiveness! 4/26:  Drafting emails and not sending them (attention)  5/4:  Pt reports creating a grocery store list and navigating the store by category until patient misplaced her list  5/5 4 pics recall 100% trials ketan ( verbalization/rehearsal x1-2),      Long Term Goals:  1  Pt will increase cognitive linguistic skills across all settings  2  Pt will increase physical and mental endurance across all settings  Assessment:  Great completion of tasks in session today! Pt reports pain peaking at a 4 and down to a 2 upon session end, eye twitch present      Recommendations:  -Patient would benefit from outpatient skilled Speech Therapy services : Cognitive-Linguistic therapy   Recommend neurology consult as well as optometry    -Frequency: 2x weekly  -Duration: 4-6 weeks    -Intervention certification from: 7/71/3215  -Intervention certification to: 6/86/07    -Intervention comments: puzzles, interactive metronome, memory/word finding strategies    Visit: 8

## 2021-05-10 NOTE — PROGRESS NOTES
Daily Note     Today's date: 2021  Patient name: Hussain Rivero  : 1980  MRN: [id number]  Referring provider: GABRIELA Sloan  Dx:   Encounter Diagnosis     ICD-10-CM    1  COVID-19 virus detected  U07 1    2  Weakness of both lower extremities  R29 898                   Subjective: Went camping this weekend walked the dogs a couple of times for about 10 min each time Feel over improvement with endurance able to walk up/down hills without SOB  Still took it slow and was tired at the end but not wiped out      Objective: See treatment diary below      Assessment: Tolerated treatment well  Noted overall improvement with cardio endurance  HR remained < 99bpm with cario activities  SPO2 WNL t/o rx session   Patient would benefit from continued PT      Plan: Continue per plan of care            Precautions: falls, monitor vitals  Re-eval Date: 2021     Date 4/27 4/29 5/3 5/6 5/11   Visit Count 6 7 8 9 10   FOTO completed  See RE  DUE NV   Pain In Left flank  See RE     Pain Out same  See RE         Manuals                                        Neuro Re-Ed        Star Drill        Natus        Core stability Sit/disc  AH 10x 5"  AH w/chest press (red wt ball) 10x  AH with Alt LE 10x  AH with alt UE 10x Sit/disc  Ravia    MTP/LTP  10#  20x    Antitrunk      Multidirectional board  6#  20x         Balance tasks  TUG  Foam  Incline  EO/EC  FGA                     Ther Ex HR 70-80s   SpO2  97-99%  HR 70-80  SpO2  98% on RA HR 75-89  SpO2 95-99% on RA HR 75-89  SpO2 95-99%  HR 70-99bpm  SPO2 96-98%   Aerobic Nustep  L2  4 min x2  SPM 50-60    TM  6 mins at 0%  0 5-1 0 rpms    RPE: 5-3PR-Pjfifsx bike  4 mins AROM  4 mins AROM  2 mins AROM    RPE: 2-3     TM  10 mins  0%  1 0-1 4 mph    RPE  1-2/10    5% @ 1 0  3 mins  3% @ 1 0  5 mins  0% at 1 0  2 mins    REP: 2-4 Elliptical  2 min  AROM  REP 5    TM  10 mins  0%  1 4 mph    7 mins  0%  1 4 mph    4 mins  0%  1 4 mph    2 mins  0%  0 7-1 0    RPE 2-4 TM  10 mins  0%  1 4 mph    TM  7 min  0%  1 5 mph    TM  5 min  0%  1 5 mph  RPE 2-4  2 min rest    Elliptical  3 min  AROM  RPM 30-40    RPE: 5   TM  10 mins  0%  1 5 mph    TM  7 min  1%  1 2-1 5 mph    RPE 2-4    TM  10 min1-4%  1 5 mph    RPE 2-5    Elliptical  4 min  AROM  RPM 30-40    RPE 3-5    2 min rest   SLR x 4 HEP       HR/TR HEP       Mini Squats Resume NV       Knee flex/ext mach 11#  3x10  RPE 2-3    22#  3x10  RPE 2-3 22#  3x10  RPE 2-3    33#  2x10  RPE 2-3    22#  3x10  RPE 2-3    33#  3x10  RPE 2-3 22#  2x10, 5"  33#  1x10, 5"  Ext  RPE 2-3    33#  3x10  flex  RPE 2-3   Leg Press 30#  40x  RPE 2 90#  1x10  75#  1x10  60#  1x10  HR/TR  50#  3x10      Prayer stretch Resume upcoming  To tolerance  Fwd  And to right       LTR NP/ Resume upcoming  Seated  To tolerance  Cues for deep breathing  2x ea side       Ther Activity                        Gait Training         **NV               Modalities

## 2021-05-11 ENCOUNTER — OFFICE VISIT (OUTPATIENT)
Dept: PHYSICAL THERAPY | Facility: CLINIC | Age: 41
End: 2021-05-11
Payer: COMMERCIAL

## 2021-05-11 ENCOUNTER — OFFICE VISIT (OUTPATIENT)
Dept: OCCUPATIONAL THERAPY | Facility: CLINIC | Age: 41
End: 2021-05-11
Payer: COMMERCIAL

## 2021-05-11 DIAGNOSIS — U07.1 COVID-19 VIRUS DETECTED: Primary | ICD-10-CM

## 2021-05-11 DIAGNOSIS — R29.898 WEAKNESS OF BOTH LOWER EXTREMITIES: ICD-10-CM

## 2021-05-11 PROCEDURE — 97110 THERAPEUTIC EXERCISES: CPT

## 2021-05-11 NOTE — PROGRESS NOTES
Daily Note     Today's date: 2021  Patient name: Dao Santiago  : 1980  MRN: [id number]  Referring provider: GABRIELA Abdul  Dx:   Encounter Diagnosis     ICD-10-CM    1  COVID-19 virus detected  U07 1                   Subjective: "That elliptical killed me today "      Objective: See treatment diary below      Assessment: Tolerated treatment well  Patient demonstrated fatigue post treatment, exhibited good technique with therapeutic exercises and would benefit from continued OT  Patient tolerated 52 card exercise program focusing on endurance  She completed 44 total cards/exercises  Patient noted sit to stand exercise was most challenging for her  Therapist observed an increase in HR following sit to stand exercise  Therapist educated patient on breathing techniques throughout session  Patient was able to complete 15 exercises before taking her first break  Patient took a total of 4 short breaks throughout session and demonstrated proper form after demonstration  Patient expressed she was able to walk her dog this past weekend up hills which she was unable to do prior  Patient verbalized onset of shakiness/tremors in toes and hands when she makes closed fist  She reports symptoms began Friday night  Pt has pulmonology appointment at end of  and states she is waiting to schedule appointment with a neurologist  She communicated with therapist she did the elliptical during PT which tired her out, but was surprised with how well she did with exercises today during OT session  Plan: Continue per plan of care  Progress treatment as tolerated               Vitals 2021   Pre Treatment SpO2 - 98  HR - 87     96% 73HR   During Treatment SpO2 - 98%  HR - 96    97% 84HR   Post Treatment SpO2 - 98%  HR - 79    99% 78HR            Neuro Re-Ed                                                                        Ther Ex 2021   Thera-Band  Sh Ext       Triceps Ext       Retraction         Biceps Curl             Digi-Flex     Blue x 20   UBE Machine     W/ alphabet game  5:11 98% 77HR Break 1:45  5:30 98% 78HR   Nu Step        Thera-Ball  Forward Flex    Rotation    Diagonal    Step/Bounce     Yellow  X 20    X 20  97% 84HR      L/R x 10  99% 78HR    52 Card Exercise Completed 44 Cards  15 cards  Break  12 cards  Break  8 cards  Break  9 cards                         Ther Activity 05/11/2021 05/06/2021   Grooved Peg Board     R - 1:23:96  L - 1:31:01   Tension Pin Pom Pom     Red - around the world 8/8   Purdue Peg Board        Brain Box w/ [employer] Activity        Laundry Management        Dynavision  A      A Endurance  B - 50% Green          A - Addition        Tension Pin on Pole        Large Peg Board        Black Peg Board     Tweezer  X 15  X 15   Multi-Matrix Super Saccades        Standing kitchen recipe following     Performed - 10 step directions            Modalities 05/11/2021 05/06/2021                                               Documentation and treatment completed this session by BETTY Pastor under the direct supervision of Francisco Javier Paz MS, OTR/L

## 2021-05-12 ENCOUNTER — OFFICE VISIT (OUTPATIENT)
Dept: SPEECH THERAPY | Facility: CLINIC | Age: 41
End: 2021-05-12
Payer: COMMERCIAL

## 2021-05-12 DIAGNOSIS — U07.1 COVID-19 VIRUS DETECTED: Primary | ICD-10-CM

## 2021-05-12 DIAGNOSIS — R41.841 COGNITIVE COMMUNICATION DEFICIT: ICD-10-CM

## 2021-05-12 PROCEDURE — 92507 TX SP LANG VOICE COMM INDIV: CPT | Performed by: NURSE PRACTITIONER

## 2021-05-12 NOTE — PROGRESS NOTES
Daily Note     Today's date: 2021  Patient name: Albina Romero  : 1980  MRN: [id number]  Referring provider: GABRIELA Roman  Dx:   Encounter Diagnosis     ICD-10-CM    1  COVID-19 virus detected  U07 1    2  Weakness of both lower extremities  R29 898    3  Decreased functional mobility and endurance  Z74 09                   Subjective: Struggling with deductive reasoning and words, IM with speech therapy  Notes improved endurance, able to walk on TM for 25 minutes with one seated rest        Objective: See treatment diary below      Assessment: Tolerated treatment well  Patient demonstrated fatigue post treatment and would benefit from continued PT  Progressing steadily with activity and endurance  Does still struggle with decreased coordination with increased fatigue  Plan: Continue per plan of care            Precautions: falls, monitor vitals  Re-eval Date: 2021     Date        Visit Count 11       FOTO completed       Pain In Left flank       Pain Out same           Manuals                                        Neuro Re-Ed        Star Drill 3 mins  Firm  Trial as HEP in doorway/threshold       Natus SOT  Adaptation  25 mins       Core stability  Sit/disc  AH 10x 5"  AH w/chest press (red wt ball) 10x  AH with Alt LE 10x  AH with alt UE 10x      Multidirectional board  **                              Ther Ex HR 75-98s   SpO2  97-99%        Aerobic TM  0%/1 2  8 min x 1  RPE 1-2    3 mins at 0%  1 2-1 5 rpms  RPE 2-3    3 mins at 2%  1 5  RPE: 2-3    3 mins at 5%  RPE: 3-4    3 mins at 5%  1 2   RPE: 2-3    5 mins cool down 5-0%  1 2 to 0 5  RPE: 1-2         SLR x 4        HR/TR        Mini Squats        Knee flex/ext mach  11#  3x10  RPE 2-3    22#  3x10  RPE 2-3      Leg Press  30#  40x  RPE 2      Prayer stretch        LTR        Ther Activity                        Gait Training                        Modalities

## 2021-05-12 NOTE — PROGRESS NOTES
Speech-Language Pathology Treatment Note    Today's date: 2021  Patient name: Edmond Burnett  : 1980  MRN: [id number]  Referring provider: GABRIELA Blackman  Dx:   Encounter Diagnosis     ICD-10-CM    1  COVID-19 virus detected  U07 1    2  Cognitive communication deficit  R41 841      Medical History significant for:   Past Medical History:   Diagnosis Date    COVID-19 2021    Disease of thyroid gland     Hypertension      Flowsheet:  Start Time: 800  Stop Time: 900  Total time in clinic (min): 60 minutes    Subjective:  Patient arrived on time to session  Hand swelling is improved with compression gloves but tremor continues when she makes a fist  Toes also tremoring intermittently  OT aware, she will informing PT next session  Sat down to try and discuss the calendar to do list with her   Strategies:  List making via categorization was helpful  ( work towards concrete categories on a list vs  Abstract categories)  - will work towards this this weekend  Work towards possibly making electronic lists to avoid losing the paper lists as previously done this past weekend   making lists to discuss with patient  Objective:  1  Pt will complete mental manipulation tasks of up to 4 words and 4 numbers @ 80% accuracy min-mod cue   :  FWD @ 100% acc i'ly, BWD @ 100%, ABC @ 100%  5/ rescramble 5 words into sentences 100% ketan, 4 words ranking 1/3 trials ketan ( verbalization) -- discontinued task following third trial     2  Patient will complete deductive reasoning problems with increased complexity and decreased prompt from min cue to independently @ 100% acc over 4 weeks  Goal advanced below, see goal #4  3   Pt will answer "wh" questions in response to paragraphs read to self and read outloud by clinician @ 80% ketan  4   Pt will complete high level multi-tier deductive reasoning puzzles with no more than 2 errors per puzzle     completed puzzle number 5, 5x4 with 11 clues completed @ 100% min cue   5/10:   Calendar, Frank's closet, dog breed, couples, house, puzzles 1-6 from [facility] [place] [facility] [place] all @ 100% acc i'ly  5/12 completed puzzle #4 [facility] 5 min 100% ketan  Advanced up to digging logic #2 gracia flowers completed together but very minimal cue needed  She was able to carryover learned information to complete digging logic puzzle #5 "picky pests" @ with min cue 1x completed 10 min  5   Patient will complete rapid naming tasks with familliar pictures increasing processing speed throughout  4/26:  Named "50+" items in 5 minutes  6   Patient will name atleast 10 items in an abstract category independently in less than 2 minutes  4/19:  Presidents 16 (1 minute), kahlil stones 9, things that reflect light 6  4/26:  Bodies of water 13, furniture 11, occupations 20, trees 10 5/5 cold: 1:10 named 10 items with min cue  Hot: 12 items in 1 min  Yellow: 10 items in 1 min  (Teaching subcategories was helpful for her )     7  Patient will complete various sustained and divided attention tasks for 2+ minutes while attending to auditory/visual stimuli with task average decreasing closer to the average range  4/21 blink cards sorting color/shape 3:57 no errors  ( utilized visual cue and self verbalization cueing to remind self of category- this helped keep her attention and reduce uncontrollable laughter)  Sort color/shape/number 2:38 no errors ( visual cue + verbalization cue)  4/28 during IM 2 min task, patient OOB and rest needed due to endurance between reps  Standing and switching hand positioning       Date Task type Task Average  SRO Early  Late  Variance    4/21 Short form task 1  Task 2  2 min visual  38   98  45 12  4  17 38  98  83 62  2  17 45  68  41   4/28  2 min vis  " 11  83 92  0 69  07 71  30 17  03   5/12  BH 2 min vis   BH 2 min vis  73  42 17  27 55  76 45  24 96  45                                         8   Pt will utilize atleast 2 word finding strategies in conversation in and out of session ketan  4/19: Word finding strategies provided  4/26:  "Hit or miss" pt reports drafting emails and not sending them  WF in session x2 SC  5/4:  No WF in session today  5/5 WF x1 with picture naming ( fire extinguisher)     9  Pt will utilize atleast 2 memory strategies in structured 3 word, # or picture immediate memory recall tasks with carryover at home  4/19:  Memory strategies provided  4/21 verbalization strategy taught in session and demonstrated effectiveness! 4/26:  Drafting emails and not sending them (attention)  5/4:  Pt reports creating a grocery store list and navigating the store by category until patient misplaced her list  5/5 4 pics recall 100% trials ketan ( verbalization/rehearsal x1-2),      Long Term Goals:  1  Pt will increase cognitive linguistic skills across all settings  2  Pt will increase physical and mental endurance across all settings  Assessment:  Great completion of tasks in session today! Following puzzles right eye twitch and head pressure  ~5 min break taken  Able to then complete 2 tasks on the metronome, with only minimal eye pressure but overall improved head pressure and eye twitch  Cognitive sonya on the phone causes eye twitch and pressure  Following IM activity, some shortness of breath  Not noticing much improvement with PEAK sonya scores over the past few weeks  Recommend visiion appointment follow-up,  Will continue to monitor visison during activity with symptoms including eye twitch and head pressure will recommend neuro optiomitry if needed  She may ultimately benefit from a neurology consult as progresses continues to be slower       Recommendations:  -Patient would benefit from outpatient skilled Speech Therapy services : Cognitive-Linguistic therapy   Recommend neurology consult as well as optometry    -Frequency: 2x weekly  -Duration: 4-6 weeks    -Intervention certification from: 1/97/2606  -Intervention certification to: 9/66/76    -Intervention comments: puzzles, interactive metronome, memory/word finding strategies    Visit: 9

## 2021-05-13 ENCOUNTER — OFFICE VISIT (OUTPATIENT)
Dept: OCCUPATIONAL THERAPY | Facility: CLINIC | Age: 41
End: 2021-05-13
Payer: COMMERCIAL

## 2021-05-13 ENCOUNTER — OFFICE VISIT (OUTPATIENT)
Dept: PHYSICAL THERAPY | Facility: CLINIC | Age: 41
End: 2021-05-13
Payer: COMMERCIAL

## 2021-05-13 DIAGNOSIS — U07.1 COVID-19 VIRUS DETECTED: Primary | ICD-10-CM

## 2021-05-13 DIAGNOSIS — Z74.09 DECREASED FUNCTIONAL MOBILITY AND ENDURANCE: ICD-10-CM

## 2021-05-13 DIAGNOSIS — R29.898 WEAKNESS OF BOTH LOWER EXTREMITIES: ICD-10-CM

## 2021-05-13 PROCEDURE — 97530 THERAPEUTIC ACTIVITIES: CPT

## 2021-05-13 PROCEDURE — 97112 NEUROMUSCULAR REEDUCATION: CPT | Performed by: PHYSICAL THERAPIST

## 2021-05-13 PROCEDURE — 97110 THERAPEUTIC EXERCISES: CPT

## 2021-05-13 PROCEDURE — 97110 THERAPEUTIC EXERCISES: CPT | Performed by: PHYSICAL THERAPIST

## 2021-05-13 NOTE — PROGRESS NOTES
Daily Note     Today's date: 2021  Patient name: Nicky Suarez  : 1980  MRN: [id number]  Referring provider: GABRIELA Ashraf  Dx:   Encounter Diagnosis     ICD-10-CM    1  COVID-19 virus detected  U07 1                   Subjective: "My hands are swollen "      Objective: See treatment diary below      Assessment: Tolerated treatment well  Patient exhibited good technique with therapeutic exercises and would benefit from continued OT  Patient noted swelling in hands at start of session  Patient noticed swelling more recently and stated it increases with certain activities  Patient tolerated increase in resistance with UBE exercise and completed entire 10 minutes without any rest periods  Patient verbalized work is going ok, but continues to feel brain fog  She completed laundry management activity, gathering wrist/ankle weights around therapy gym while holding laundry basket to simulate daily tasks at home  Patient collected a total of 10lbs, holding the basket the entire time  Patient was able to do TB exercises standing this session and mentioned she used do them seated  Therapist noted an increase in HR following standing activities, blood oxygen levels remaining consistent  Therapist observed and commented on overall improvement in endurance and strength this session  Patient agreed and stated she noticed a difference as well  Plan: Continue per plan of care  Progress treatment as tolerated         Vitals 2021   Pre Treatment SpO2 - 98  HR - 87  SpO2 -98  HR -70   96% 73HR   During Treatment SpO2 - 98%  HR - 96 SpO2 - 96%  HR -84   97% 84HR   Post Treatment SpO2 - 98%  HR - 79 SpO2 -97  HR -80   99% 78HR            Neuro Re-Ed                                                                        Ther Ex 2021   Thera-Band  Sh Ext       Triceps Ext       Retraction         Biceps Curl       Red - standing  X 20    X 20      X 20        X 20      Digi-Flex  Blue x 20   Blue x 20   UBE Machine  L 55  Completed 10 Min - no breaks  96% 75HR     W/ alphabet game  5:11 98% 77HR Break 1:45  5:30 98% 78HR   Nu Step        Thera-Ball  Forward Flex    Rotation    Diagonal    Step/Bounce  Yellow  X 20  97% 80HR  X 20  98% 83HR  X 20  98% 8       Yellow  X 20    X 20  97% 84HR      L/R x 10  99% 78HR    52 Card Exercise Completed 44 Cards  15 cards  Break  12 cards  Break  8 cards  Break  9 cards                         Ther Activity 05/11/2021 05/13/2021 05/06/2021   Grooved Peg Board     R - 1:23:96  L - 1:31:01   Tension Pin Pom Pom  Red - around the world 7/8   Red - around the world 8/8   [employer] Peg Board        Brain Box w/ [employer] Activity        Laundry Management  collected 10# w/ basket      Dynavision  A      A Endurance  B - 50% Green          A - Addition        Tension Pin on Pole        Large Peg Board        Black Peg Board  Tweezer  X 15 L  X 15 R   Tweezer  X 15  X 15   Multi-Matrix Super Saccades        Standing kitchen recipe following     Performed - 10 step directions            Modalities 05/11/2021 05/13/2021 05/06/2021                                             Documentation and treatment completed this session by BETTY Sumner under the direct supervision of Avery Jones MS, OTR/L

## 2021-05-17 ENCOUNTER — OFFICE VISIT (OUTPATIENT)
Dept: SPEECH THERAPY | Facility: CLINIC | Age: 41
End: 2021-05-17
Payer: COMMERCIAL

## 2021-05-17 DIAGNOSIS — U07.1 COVID-19 VIRUS DETECTED: Primary | ICD-10-CM

## 2021-05-17 PROCEDURE — 92507 TX SP LANG VOICE COMM INDIV: CPT

## 2021-05-17 NOTE — PROGRESS NOTES
Daily Note     Today's date: 2021  Patient name: Akosua Fermin  : 1980  MRN: [id number]  Referring provider: GABRIELA Morrison  Dx:   Encounter Diagnosis     ICD-10-CM    1  COVID-19 virus detected  U07 1    2  Weakness of both lower extremities  R29 898    3  Decreased functional mobility and endurance  Z74 09                   Subjective: Patient reports she is feeling okay, still not 100% and still foggy  Objective: See treatment diary below      Assessment: Tolerated treatment well  Patient demonstrated fatigue post treatment and would benefit from continued PT  Struggled with VOR activities this date  Eye fatigue  Plan: Continue per plan of care          Precautions: falls, monitor vitals  Re-eval Date: 2021     Date       Visit Count 11 12      FOTO completed       Pain In Left flank       Pain Out same           Manuals                                        Neuro Re-Ed        Star Drill 3 mins  Firm  Trial as HEP in doorway/threshold       Natus SOT  Adaptation  25 mins       Core stability  Sit  Peanut  MTP with AH 2x10  9#  LTP with AH  2x10  9#  Biceps  2x10  9#  Triceps  2x10  9#        Multidirectional board  ** NV      VOR Near  Stand  Foam  Busy  Hip width  85 bpm  R/L  85 bpm  Up/down  60" ea with cl S      VOR Far  Stand  Foam  Busy  Hip width  95 bpm  R/L  100 bpm  Up/down  60" ea with cl S      Imaginary Target  Stand  Foam  Busy  60 sec R  60 sec L  Hip Width      Eyes-Head  Stand  Foam  Busy  60 sec   Hip Width                      Ther Ex HR 75-98s   SpO2  97-99%  HR 80s, 98% on RA      Aerobic TM  0%/1 2  8 min x 1  RPE 1-2    3 mins at 0%  1 2-1 5 rpms  RPE 2-3    3 mins at 2%  1 5  RPE: 2-3    3 mins at 5%  RPE: 3-4    3 mins at 5%  1 2   RPE: 2-3    5 mins cool down 5-0%  1 2 to 0 5  RPE: 1-2   TM  5% - 3 mins  1 4 mph    4% - 3 mins  1 7 mph    3% - 3 mins  2 1 mph    2% - 3 mins  2 2 mph    1% - 3 mins  2 4 mph    0% - 3 mins  2 5 mph    Cool down - 2 mins          SLR x 4        HR/TR        Mini Squats        Knee flex/ext mach  22#  2x10  RPE 2-3    22#  2x10  RPE 2-3      Leg Press  2x10  75x  RPE 2      Prayer stretch        LTR        Ther Activity                        Gait Training                        Modalities

## 2021-05-17 NOTE — PROGRESS NOTES
Speech-Language Pathology Treatment Note    Today's date: 2021  Patient name: Jeronimo Collins  : 1980  MRN: [id number]  Referring provider: GABRIELA Dennison  Dx:   Encounter Diagnosis     ICD-10-CM    1  COVID-19 virus detected  U07 1      Medical History significant for:   Past Medical History:   Diagnosis Date    COVID-19 2021    Disease of thyroid gland     Hypertension      Flowsheet:  Start Time: 0900  Stop Time: 1000  Total time in clinic (min): 60 minutes    Subjective:  Patient arrived on time to session  Reported success shopping this past weekend in preparation for vacation  Organized list by meal and category  Symptomatic throughout the session with eye twitching, leg bouncing, and pressure in the back of the head  Strategies:  List making via categorization was helpful  ( work towards concrete categories on a list vs  Abstract categories)  - will work towards this this weekend  Work towards possibly making electronic lists to avoid losing the paper lists as previously done this past weekend   making lists to discuss with patient  Objective:  1  Pt will complete mental manipulation tasks of up to 4 words and 4 numbers @ 80% accuracy min-mod cue   :  FWD @ 100% acc i'ly, BWD @ 100%, ABC @ 100%   rescramble 5 words into sentences 100% ketan, 4 words ranking 1/3 trials ketan ( verbalization) -- discontinued task following third trial : 4 words BWD @ 88% acc ketan  2   Patient will complete deductive reasoning problems with increased complexity and decreased prompt from min cue to independently @ 100% acc over 4 weeks  Goal advanced below, see goal #4  3   Pt will answer "wh" questions in response to paragraphs read to self and read outloud by clinician @ 80% ketan  : Wh- questions, paragraph read outloud by clinician @ 88% acc ketan, increasing to 100% w/ repetition       4   Pt will complete high level multi-tier deductive reasoning puzzles with no more than 2 errors per puzzle  4/28 completed puzzle number 5, 5x4 with 11 clues completed @ 100% min cue   5/10:   Calendar, Frank's closet, dog breed, couples, house, puzzles 1-6 from [facility] all @ 100% acc i'ly  5/12 completed puzzle #4 [employer] 5 min 100% ketan  Advanced up to digging logic #2 gracia flowers completed together but very minimal cue needed  She was able to carryover learned information to complete digging logic puzzle #5 "picky pests" @ with min cue 1x completed 10 min  5/17:  Digging Logic puzzle 3 completed @ 100% acc with min-mod cue, puzzle 4 completed @ 50% acc increased to 100% acc when instructed to re-attempt  5   Patient will complete rapid naming tasks with familliar pictures increasing processing speed throughout  4/26:  Named "50+" items in 5 minutes  6   Patient will name atleast 10 items in an abstract category independently in less than 2 minutes  4/19:  Presidents 16 (1 minute), kahlil stones 9, things that reflect light 6 4/26:  Bodies of water 13, furniture 11, occupations 20, trees 10 5/5 cold: 1:10 named 10 items with min cue  Hot: 12 items in 1 min  Yellow: 10 items in 1 min  (Teaching subcategories was helpful for her ) 5/17: Things that use batteries 10/10 ketan, team sports 10/10 ketan, things you wear on your feet 10/10 ketan, school subjects 10/10 ketan, and famous last names 10/10 w/ mod  cueing  Difficulty w/ famous last names due to broad nature  7   Patient will complete various sustained and divided attention tasks for 2+ minutes while attending to auditory/visual stimuli with task average decreasing closer to the average range  4/21 blink cards sorting color/shape 3:57 no errors  ( utilized visual cue and self verbalization cueing to remind self of category- this helped keep her attention and reduce uncontrollable laughter)  Sort color/shape/number 2:38 no errors ( visual cue + verbalization cue)   4/28 during IM 2 min task, patient OOB and rest needed due to endurance between reps  Standing and switching hand positioning  Date Task type Task Average  SRO Early  Late  Variance    4/21 Short form task 1  Task 2  2 min visual BH 38   98  45 12  4  17 38  98  83 62  2  17 45  68  41   4/28 BH 2 min vis  " 51  67 17  9 72  89 28  11 57  43   5/12  BH 2 min vis   BH 2 min vis  73  42 17  27 55  76 45  24 96  45                                         8   Pt will utilize atleast 2 word finding strategies in conversation in and out of session ketan  4/19: Word finding strategies provided  4/26:  "Hit or miss" pt reports drafting emails and not sending them  WF in session x2 SC  5/4:  No WF in session today  5/5 WF x1 with picture naming ( fire extinguisher)  5/17:  No WF noted  9   Pt will utilize atleast 2 memory strategies in structured 3 word, # or picture immediate memory recall tasks with carryover at home  4/19:  Memory strategies provided  4/21 verbalization strategy taught in session and demonstrated effectiveness! 4/26:  Drafting emails and not sending them (attention)  5/4:  Pt reports creating a grocery store list and navigating the store by category until patient misplaced her list  5/5 4 pics recall 100% trials ketan ( verbalization/rehearsal x1-2),      Long Term Goals:  1  Pt will increase cognitive linguistic skills across all settings  2  Pt will increase physical and mental endurance across all settings  Assessment:  Great awareness of symptoms and when to take breaks/ask for help  Did well with all activities and was able to reflect on strategies and areas of improvement  Discussed neuro optometry if needed after family optometrist appt       Recommendations:  -Patient would benefit from outpatient skilled Speech Therapy services : Cognitive-Linguistic therapy   Recommend neurology consult as well as optometry    -Frequency: 2x weekly  -Duration: 4-6 weeks    -Intervention certification from: 8/60/5348  -Intervention certification to: 5/14/21    -Intervention comments: puzzles, interactive metronome, memory/word finding strategies    Visit: 10

## 2021-05-18 ENCOUNTER — OFFICE VISIT (OUTPATIENT)
Dept: OCCUPATIONAL THERAPY | Facility: CLINIC | Age: 41
End: 2021-05-18
Payer: COMMERCIAL

## 2021-05-18 ENCOUNTER — OFFICE VISIT (OUTPATIENT)
Dept: PHYSICAL THERAPY | Facility: CLINIC | Age: 41
End: 2021-05-18
Payer: COMMERCIAL

## 2021-05-18 DIAGNOSIS — Z74.09 DECREASED FUNCTIONAL MOBILITY AND ENDURANCE: ICD-10-CM

## 2021-05-18 DIAGNOSIS — R29.898 WEAKNESS OF BOTH LOWER EXTREMITIES: ICD-10-CM

## 2021-05-18 DIAGNOSIS — U07.1 COVID-19 VIRUS DETECTED: Primary | ICD-10-CM

## 2021-05-18 PROCEDURE — 97530 THERAPEUTIC ACTIVITIES: CPT

## 2021-05-18 PROCEDURE — 97112 NEUROMUSCULAR REEDUCATION: CPT | Performed by: PHYSICAL THERAPIST

## 2021-05-18 PROCEDURE — 97110 THERAPEUTIC EXERCISES: CPT

## 2021-05-18 PROCEDURE — 97110 THERAPEUTIC EXERCISES: CPT | Performed by: PHYSICAL THERAPIST

## 2021-05-19 ENCOUNTER — OFFICE VISIT (OUTPATIENT)
Dept: SPEECH THERAPY | Facility: CLINIC | Age: 41
End: 2021-05-19
Payer: COMMERCIAL

## 2021-05-19 DIAGNOSIS — R41.841 COGNITIVE COMMUNICATION DEFICIT: ICD-10-CM

## 2021-05-19 DIAGNOSIS — U07.1 COVID-19 VIRUS DETECTED: Primary | ICD-10-CM

## 2021-05-19 PROCEDURE — 92507 TX SP LANG VOICE COMM INDIV: CPT | Performed by: NURSE PRACTITIONER

## 2021-05-19 NOTE — PROGRESS NOTES
Speech-Language Pathology Treatment Note    Today's date: 2021  Patient name: Samantha Du  : 1980  MRN: [id number]  Referring provider: GABRIELA Harris  Dx:   No diagnosis found  Medical History significant for:   Past Medical History:   Diagnosis Date    COVID-2021    Disease of thyroid gland     Hypertension      Flowsheet:  Start Time: 0800  Stop Time: 0900  Total time in clinic (min): 60 minutes    Subjective:  Patient arrived on time to session  Reported feeling overwhelmed due to preparation for upcoming vacation  Symptomatic throughout the session with eye twitching and pressure in the back of the head  Ended 15 min early due to pt's attendance at a meeting  Strategies:  List making via categorization was helpful  ( work towards concrete categories on a list vs  Abstract categories)  - will work towards this this weekend  Work towards possibly making electronic lists to avoid losing the paper lists as previously done this past weekend   making lists to discuss with patient  Objective:  1  Pt will complete mental manipulation tasks of up to 4 words and 4 numbers @ 80% accuracy min-mod cue   :  FWD @ 100% acc i'ly, BWD @ 100%, ABC @ 100%   rescramble 5 words into sentences 100% ketan, 4 words ranking 1/3 trials ketan ( verbalization) -- discontinued task following third trial : 4 words BWD @ 88% acc ketan   4 words ranked over 4/5 trial, difficulty with grandfather set (symptoms- head pressure/eye twitch)  2   Patient will complete deductive reasoning problems with increased complexity and decreased prompt from min cue to independently @ 100% acc over 4 weeks  Goal advanced below, see goal #4  3   Pt will answer "wh" questions in response to paragraphs read to self and read outloud by clinician @ 80% ketan  : Wh- questions, paragraph read outloud by clinician @ 88% acc ketan, increasing to 100% w/ repetition    Paragraph read out loud by clinician, answered questions @ 80% acc ketan, both eyes twitching after "[address]" questions  4   Pt will complete high level multi-tier deductive reasoning puzzles with no more than 2 errors per puzzle  4/28 completed puzzle number 5, 5x4 with 11 clues completed @ 100% min cue   5/10:   Calendar, Frank's closet, dog breed, couples, house, puzzles 1-6 from [facility] all @ 100% acc i'ly  5/12 completed puzzle #4 [employer] 5 min 100% ketan  Advanced up to digging logic #2 grcaia flowers completed together but very minimal cue needed  She was able to carryover learned information to complete digging logic puzzle #5 "picky pests" @ with min cue 1x completed 10 min  5/17:  Digging Logic puzzle 3 completed @ 100% acc with min-mod cue, puzzle 4 completed @ 50% acc increased to 100% acc when instructed to re-attempt  5   Patient will complete rapid naming tasks with familliar pictures increasing processing speed throughout  4/26:  Named "50+" items in 5 minutes  6   Patient will name atleast 10 items in an abstract category independently in less than 2 minutes  4/19:  Presidents 16 (1 minute), kahlil stones 9, things that reflect light 6 4/26:  Bodies of water 13, furniture 11, occupations 20, trees 10 5/5 cold: 1:10 named 10 items with min cue  Hot: 12 items in 1 min  Yellow: 10 items in 1 min  (Teaching subcategories was helpful for her ) 5/17: Things that use batteries 10/10 ketan, team sports 10/10 ketan, things you wear on your feet 10/10 ketan, school subjects 10/10 ketan, and famous last names 10/10 w/ mod  cueing  Difficulty w/ famous last names due to broad nature  7   Patient will complete various sustained and divided attention tasks for 2+ minutes while attending to auditory/visual stimuli with task average decreasing closer to the average range  4/21 blink cards sorting color/shape 3:57 no errors   ( utilized visual cue and self verbalization cueing to remind self of category- this helped keep her attention and reduce uncontrollable laughter)  Sort color/shape/number 2:38 no errors ( visual cue + verbalization cue)  4/28 during IM 2 min task, patient OOB and rest needed due to endurance between reps  Standing and switching hand positioning  Date Task type Task Average  SRO Early  Late  Variance    4/21 Short form task 1  Task 2  2 min visual BH 38   98  45 12  4  17 38  98  83 62  2  17 45  68  41   4/28 BH 2 min vis  " 38  87 56  2 17  90 82  23 82  27   5/12  BH 2 min vis   BH 2 min vis  73  42 17  27 55  76 45  24 96  45   5/19 BH 2min vis   " 34  38 27  21 61  75 39  25 39  38                                8   Pt will utilize atleast 2 word finding strategies in conversation in and out of session ketan  4/19: Word finding strategies provided  4/26:  "Hit or miss" pt reports drafting emails and not sending them  WF in session x2 SC  5/4:  No WF in session today  5/5 WF x1 with picture naming ( fire extinguisher)  5/17:  No WF noted  5/19 No WF noted  9   Pt will utilize atleast 2 memory strategies in structured 3 word, # or picture immediate memory recall tasks with carryover at home  4/19:  Memory strategies provided  4/21 verbalization strategy taught in session and demonstrated effectiveness! 4/26:  Drafting emails and not sending them (attention)  5/4:  Pt reports creating a grocery store list and navigating the store by category until patient misplaced her list  5/5 4 pics recall 100% trials ketan ( verbalization/rehearsal x1-2),      Long Term Goals:  1  Pt will increase cognitive linguistic skills across all settings  2  Pt will increase physical and mental endurance across all settings  Assessment: Great work completing activities  Pt was able to note progress on metronome task  Ended session on a good note without additional symptoms reported  Benefiting from short 1-2 min breaks between activities        Recommendations:  -Patient would benefit from outpatient skilled Speech Therapy services : Cognitive-Linguistic therapy   Recommend neurology consult as well as optometry    -Frequency: 2x weekly  -Duration: 4-6 weeks    -Intervention certification from: 0/31/6072  -Intervention certification to: 7/28/99    -Intervention comments: puzzles, interactive metronome, memory/word finding strategies    Visit: 12

## 2021-05-19 NOTE — PROGRESS NOTES
Daily Note     Today's date: 2021  Patient name: Caitlin Lara  : 1980  MRN: [id number]  Referring provider: GABRIELA Shaw  Dx:   Encounter Diagnosis     ICD-10-CM    1  COVID-19 virus detected  U07 1    2  Weakness of both lower extremities  R29 898    3  Decreased functional mobility and endurance  Z74 09                   Subjective: Brain fog and arm soreness/fatigue with increased physical fatigue last evening  Objective: See treatment diary below      Assessment: Tolerated treatment well  Patient demonstrated fatigue post treatment and would benefit from continued PT  Decreased foot clearance with increased fatigue  Plan: Continue per plan of care         Precautions: falls, monitor vitals  Re-eval Date: 2021     Date      Visit Count 11 12 13     FOTO completed       Pain In Left flank       Pain Out same           Manuals                                        Neuro Re-Ed        Star Drill 3 mins  Firm  Trial as HEP in doorway/threshold       Natus SOT  Adaptation  25 mins       Core stability  Sit  Peanut  MTP with AH 2x10  9#  LTP with AH  2x10  9#  Biceps  2x10  9#  Triceps  2x10  9#   Obstacle course   9 mins   for 8" and 10" step ups with and without load  Red foam with obstacles/uneven, yellow pads      Multidirectional board  ** NV      VOR Near  Stand  Foam  Busy  Hip width  85 bpm  R/L  85 bpm  Up/down  60" ea with cl S Stand  Foam  Busy  Hip width  105 bpm  R/L  105 bpm  Up/down  60" ea with S     VOR Far  Stand  Foam  Busy  Hip width  95 bpm  R/L  100 bpm  Up/down  60" ea with cl S Stand  Foam  Busy  Hip width  100 bpm  R/L  100 bpm  Up/down  60" ea with cl S     Imaginary Target  Stand  Foam  Busy  60 sec R  60 sec L  Hip Width Stand  Foam  Busy  60 sec R  60 sec L  Hip Width     Eyes-Head  Stand  Foam  Busy  60 sec   Hip Width Stand  Foam  Busy  60 sec   Hip Width                     Ther Ex HR 75-98s   SpO2  97-99%  HR 80s, 98% on RA HR   SpO2 98-99% on RA     Aerobic TM  0%/1 2  8 min x 1  RPE 1-2    3 mins at 0%  1 2-1 5 rpms  RPE 2-3    3 mins at 2%  1 5  RPE: 2-3    3 mins at 5%  RPE: 3-4    3 mins at 5%  1 2   RPE: 2-3    5 mins cool down 5-0%  1 2 to 0 5  RPE: 1-2   TM  5% - 3 mins  1 4 mph    4% - 3 mins  1 7 mph    3% - 3 mins  2 1 mph    2% - 3 mins  2 2 mph    1% - 3 mins  2 4 mph    0% - 3 mins  2 5 mph    Cool down - 2 mins     TM  5% - 3 mins  1 4 mph    4% - 3 mins  1 7 mph    3% - 3 mins  2 1 mph    2% - 3 mins  2 2 mph    1% - 3 mins  2 4 mph    0% - 3 mins  2 4 mph    Cool down - 2 mins     SLR x 4        HR/TR        Mini Squats        Knee flex/ext mach  22#  2x10  RPE 2-3    22#  2x10  RPE 2-3 22#  2x10  RPE 2-3  Extn  33#  2x10  RPE 2-3     Leg Press  2x10  75x  RPE 2 2x10  75#  RPE 2     Prayer stretch        LTR        Ther Activity                        Gait Training                        Modalities

## 2021-05-20 ENCOUNTER — OFFICE VISIT (OUTPATIENT)
Dept: OCCUPATIONAL THERAPY | Facility: CLINIC | Age: 41
End: 2021-05-20
Payer: COMMERCIAL

## 2021-05-20 ENCOUNTER — OFFICE VISIT (OUTPATIENT)
Dept: PHYSICAL THERAPY | Facility: CLINIC | Age: 41
End: 2021-05-20
Payer: COMMERCIAL

## 2021-05-20 DIAGNOSIS — R29.898 WEAKNESS OF BOTH LOWER EXTREMITIES: ICD-10-CM

## 2021-05-20 DIAGNOSIS — Z74.09 DECREASED FUNCTIONAL MOBILITY AND ENDURANCE: ICD-10-CM

## 2021-05-20 DIAGNOSIS — U07.1 COVID-19 VIRUS DETECTED: Primary | ICD-10-CM

## 2021-05-20 PROCEDURE — 97112 NEUROMUSCULAR REEDUCATION: CPT | Performed by: PHYSICAL THERAPIST

## 2021-05-20 PROCEDURE — 97530 THERAPEUTIC ACTIVITIES: CPT

## 2021-05-20 PROCEDURE — 97110 THERAPEUTIC EXERCISES: CPT | Performed by: PHYSICAL THERAPIST

## 2021-05-20 NOTE — PROGRESS NOTES
Daily Note     Today's date: 2021  Patient name: Brianna Zamorano  : 1980  MRN: [id number]  Referring provider: GABRIELA Yanes  Dx:   Encounter Diagnosis     ICD-10-CM    1  COVID-19 virus detected  U07 1                   Subjective: "I'm going on vacation tomorrow "      Objective: See treatment diary below      Assessment: Tolerated treatment well  Patient demonstrated fatigue post treatment, exhibited good technique with therapeutic exercises and would benefit from continued OT  Patient participated in [employer] the [facility] this session consisting of endurance and cognitive tasks  Pt completed motor and cognitive tasks simultaneously  Patient tolerated increase in resistance with UBE exercise while coming up with a grocery list  Patient did well following multi-step kitchen task consisting of recipe following and loading/unloading  while answering yes/no questions  Pt completed item retrieval of cuff weights around therapy gym while answering open-ended questions  She loaded/folded laundry to simulate household tasks to increase activity tolerance and endurance  Lastly, patient sorted a deck of cards by suite while counting down from 100 by 7s and reciting the alphabet backwards  Pt was able to complete each activity this session with no breaks  She noted some cognitive tasks were more difficult than others  Pt maintained consistent SpO2 and HR levels throughout  Plan: Continue per plan of care  Progress treatment as tolerated         Vitals 2021    Pre Treatment SpO2 - 98  HR - 87  SpO2 -98  HR -70 SpO2-97%  HR 81 SpO2 - 98%  HR - 80    During Treatment SpO2 - 98%  HR - 96 SpO2 - 96%  HR -84 SpO2 98%  HR-77 SpO2 - 98%  HR - 89    Post Treatment SpO2 - 98%  HR - 79 SpO2 -97  HR -80 SpO2 98%  HR 70 SpO2 - 98%  Hr - 77             Neuro Re-Ed                                                                        Ther Ex 2021 5/18/2021 05/20/2121    Thera-Band  Sh Ext       Triceps Ext       Retraction         Biceps Curl       Red - standing  X 20    X 20      X 20        X 20 Red standing  X 20      X 20      X 20        X 20     Digi-Flex  Blue x 20 Blue x 20     UBE Machine  L 55  Completed 10 Min - no breaks  96% 75HR   L 55   5 Min Stand   5 Min Seat  1 break  98%  79HR   L 50 - Seated  10 Min  No break  98% 77HR  + Cog Task    Nu Step   10 Min L 3  97%   HR 75     Thera-Ball  Forward Flex    Rotation    Diagonal    Step/Bounce  Yellow  X 20  97% 80HR  X 20  98% 83HR  X 20  98% 8           52 Card Exercise Completed 44 Cards  15 cards  Break  12 cards  Break  8 cards  Break  9 cards                         Ther Activity 05/11/2021 05/13/2021 5/18/2021 05/20/2021    Grooved Peg Board        Tension Pin Pom Pom  Red - around the world 7/8 Red-   [facility] 7/8  [place] 6/8       [employer] Peg Board        Brain Box w/ [employer] Activity        Laundry Management  collected 10# w/ basket      Dynavision  A      A Endurance  B - 50% Green          A - Addition   43/1 40  40/1 50 w/ naming ice cream flavors(11)  R/R17/17/1 57  21/21/1 44  L/G18/18/1 77  21/21/1 44  Standing on aeromat  41/1 46  Miss 1     Tension Pin on [employer] Peg Board        Black Peg Board  Tweezer  X 15 L  X 15 R      Multi-Matrix Super Saccades        Standing kitchen recipe following        [employer]    Completed     Modalities 05/11/2021 05/13/2021 5/18/2021 05/20/2021                                              Documentation and treatment completed this session by Lazarus Darter, OTS under the direct supervision of Avery Jones MS, OTR/L

## 2021-05-24 ENCOUNTER — APPOINTMENT (OUTPATIENT)
Dept: SPEECH THERAPY | Facility: CLINIC | Age: 41
End: 2021-05-24
Payer: COMMERCIAL

## 2021-05-25 ENCOUNTER — APPOINTMENT (OUTPATIENT)
Dept: PHYSICAL THERAPY | Facility: CLINIC | Age: 41
End: 2021-05-25
Payer: COMMERCIAL

## 2021-05-25 ENCOUNTER — APPOINTMENT (OUTPATIENT)
Dept: OCCUPATIONAL THERAPY | Facility: CLINIC | Age: 41
End: 2021-05-25
Payer: COMMERCIAL

## 2021-05-26 ENCOUNTER — OFFICE VISIT (OUTPATIENT)
Dept: SPEECH THERAPY | Facility: CLINIC | Age: 41
End: 2021-05-26
Payer: COMMERCIAL

## 2021-05-26 DIAGNOSIS — U07.1 COVID-19 VIRUS DETECTED: Primary | ICD-10-CM

## 2021-05-26 DIAGNOSIS — R41.841 COGNITIVE COMMUNICATION DEFICIT: ICD-10-CM

## 2021-05-26 PROCEDURE — 92507 TX SP LANG VOICE COMM INDIV: CPT | Performed by: NURSE PRACTITIONER

## 2021-05-26 NOTE — PROGRESS NOTES
Speech-Language Pathology Treatment Note    Today's date: 2021  Patient name: Fantasma Davis  : 1980  MRN: [id number]  Referring provider: GABRIELA Eng  Dx:   Encounter Diagnosis     ICD-10-CM    1  COVID-19 virus detected  U07 1    2  Cognitive communication deficit  R41 [id number]      Medical History significant for:   Past Medical History:   Diagnosis Date    COVID-19 2021    Disease of thyroid gland     Hypertension      Flowsheet:  Start Time: 0815  Stop Time: 09  Total time in clinic (min): 45 minutes    Subjective:  Patient arrived on time to session  Reported feeling overwhelmed due to preparation for upcoming vacation  Pt had second vaccine last week with mild symptoms which resolved quickly by the next day  Pt reports successful camping trip; however, difficulty placing items in correct places  Questionable Correlation observed by patient between eye twitching and head pressure with screen time and scrolling   is vision appointment  No symptoms to start session  Most recent Grocery shopping successful and list making was successful for her with extended time needed  Objective:  1  Pt will complete mental manipulation tasks of up to 4 words and 4 numbers @ 80% accuracy min-mod cue   :  FWD @ 100% acc i'ly, BWD @ 100%, ABC @ 100%   rescramble 5 words into sentences 100% ketan, 4 words ranking 1/3 trials ketan ( verbalization) -- discontinued task following third trial : 4 words BWD @ 88% acc ketan   4 words ranked over 4/5 trial, difficulty with grandfather set (symptoms- head pressure/eye twitch)  2   Patient will complete deductive reasoning problems with increased complexity and decreased prompt from min cue to independently @ 100% acc over 4 weeks  Goal advanced below, see goal #4  3   Pt will answer "wh" questions in response to paragraphs read to self and read outloud by clinician @ 80% ketan  :  Wh- questions, paragraph read outloud by clinician @ 88% acc ketan, increasing to 100% w/ repetition  5/19 Paragraph read out loud by clinician, answered questions @ 80% acc ketan, both eyes twitching after "[facility]" questions  4   Pt will complete high level multi-tier deductive reasoning puzzles with no more than 2 errors per puzzle  4/28 completed puzzle number 5, 5x4 with 11 clues completed @ 100% min cue   5/10:   Calendar, Frnak's closet, dog breed, couples, house, puzzles 1-6 from [facility] all @ 100% acc i'ly  5/12 completed puzzle #4 WALC 5 min 100% ketan  Advanced up to digging logic #2 gracia flowers completed together but very minimal cue needed  She was able to carryover learned information to complete digging logic puzzle #5 "picky pests" @ with min cue 1x completed 10 min  5/17:  Digging Logic puzzle 3 completed @ 100% acc with min-mod cue, puzzle 4 completed @ 50% acc increased to 100% acc when instructed to re-attempt  5/26 puzzle #7 digging logic completed @ 100% ketan in 4 min  5   Patient will complete rapid naming tasks with familliar pictures increasing processing speed throughout  4/26:  Named "50+" items in 5 minutes  6   Patient will name atleast 10 items in an abstract category independently in less than 2 minutes  4/19:  Presidents 16 (1 minute), kahlil stones 9, things that reflect light 6  4/26:  Bodies of water 13, furniture 11, occupations 20, trees 10 5/5 cold: 1:10 named 10 items with min cue  Hot: 12 items in 1 min  Yellow: 10 items in 1 min  (Teaching subcategories was helpful for her ) 5/17: Things that use batteries 10/10 ketan, team sports 10/10 ketan, things you wear on your feet 10/10 ketan, school subjects 10/10 ketan, and famous last names 10/10 w/ mod  cueing  Difficulty w/ famous last names due to broad nature  7   Patient will complete various sustained and divided attention tasks for 2+ minutes while attending to auditory/visual stimuli with task average decreasing closer to the average range   4/21 blink cards sorting color/shape 3:57 no errors  ( utilized visual cue and self verbalization cueing to remind self of category- this helped keep her attention and reduce uncontrollable laughter)  Sort color/shape/number 2:38 no errors ( visual cue + verbalization cue)  4/28 during IM 2 min task, patient OOB and rest needed due to endurance between reps  Standing and switching hand positioning  Date Task type Task Average  SRO Early  Late  Variance    4/21 Short form task 1  Task 2  2 min visual BH 38   98  45 12  4  17 38  98  83 62  2  17 45  68  41   4/28 BH 2 min vis  " 51  67 17  9 72  89 28  11 57  43   5/12  BH 2 min vis   BH 2 min vis  73  42 17  27 55  76 45  24 96  45   5/19 BH 2min vis   " 34  38 27  21 61  75 39  25 39  38   5/26  1min vis warm up  "BH 2min vis+guide  "ABC/123  " categories  41  64  87  67   25  12  8  15 71  79  67  71 29  21  33  29 46  66  100  75                       8   Pt will utilize atleast 2 word finding strategies in conversation in and out of session ketan  4/19: Word finding strategies provided  4/26:  "Hit or miss" pt reports drafting emails and not sending them  WF in session x2 SC  5/4:  No WF in session today  5/5 WF x1 with picture naming ( fire extinguisher)  5/17:  No WF noted  5/19 No WF noted  9   Pt will utilize atleast 2 memory strategies in structured 3 word, # or picture immediate memory recall tasks with carryover at home  4/19:  Memory strategies provided  4/21 verbalization strategy taught in session and demonstrated effectiveness! 4/26:  Drafting emails and not sending them (attention)  5/4:  Pt reports creating a grocery store list and navigating the store by category until patient misplaced her list  5/5 4 pics recall 100% trials ketan ( verbalization/rehearsal x1-2),      Long Term Goals:  1  Pt will increase cognitive linguistic skills across all settings  2  Pt will increase physical and mental endurance across all settings        Assessment: Therisa Fragmin work completing activities  Pt able to tolerate increased cognitive tasks on metronome today  No symptoms reported at end of session       Recommendations:  -Patient would benefit from outpatient skilled Speech Therapy services : Cognitive-Linguistic therapy   Recommend neurology consult as well as optometry    -Frequency: 2x weekly  -Duration: 4-6 weeks    -Intervention certification from: 2/38/4136  -Intervention certification to: 2/32/55    -Intervention comments: puzzles, interactive metronome, memory/word finding strategies    Visit: 13

## 2021-05-26 NOTE — PROGRESS NOTES
Daily Note     Today's date: 2021  Patient name: Loletta Koyanagi  : 1980  MRN: [id number]  Referring provider: GABRIELA Martínez  Dx:   Encounter Diagnosis     ICD-10-CM    1  COVID-19 virus detected  U07 1    2  Weakness of both lower extremities  R29 [id number]    3  Decreased functional mobility and endurance  Z74 09                   Subjective: Reports she is doing better with endurance, balance intermittently suffers  Objective: See treatment diary below      Assessment: Tolerated treatment well  Patient demonstrated fatigue post treatment and would benefit from continued PT  Patient with ongoing deficits with vestibular/visual challenges  Otherwise progressing well  Plan: Continue per plan of care        Precautions: falls, monitor vitals  Re-eval Date: 2021     Date     Visit Count 11 12 13 14    FOTO completed       Pain In Left flank   0    Pain Out same   0        Manuals                                        Neuro Re-Ed        Star Drill 3 mins  Firm  Trial as HEP in doorway/threshold       Natus SOT  Adaptation  25 mins       Core stability  Sit  Peanut  MTP with AH 2x10  9#  LTP with AH  2x10  9#  Biceps  2x10  9#  Triceps  2x10  9#   Obstacle course   9 mins   for 8" and 10" step ups with and without load  Red foam with obstacles/uneven, yellow pads      Multidirectional board  ** NV      VOR Near  Stand  Foam  Busy  Hip width  85 bpm  R/L  85 bpm  Up/down  60" ea with cl S Stand  Foam  Busy  Hip width  105 bpm  R/L  105 bpm  Up/down  60" ea with S Stand  Foam  Busy - Disco ball  Rhom  105 bpm  R/L  105 bpm  Up/down  110 bpm  30" ea with S    VOR Far  Stand  Foam  Busy  Hip width  95 bpm  R/L  100 bpm  Up/down  60" ea with cl S Stand  Foam  Busy  Hip width  100 bpm  R/L  100 bpm  Up/down  60" ea with cl S Stand  Foam  Busy - Disco ball  Rhom   100 bpm  R/L  115 bpm  Up/down  120 bpm  30" ea with cl S    Imaginary Target  Stand  Foam  Busy  60 sec R  60 sec L  Hip Width Stand  Foam  Busy  60 sec R  60 sec L  Hip Width     Eyes-Head  Stand  Foam  Busy  60 sec   Hip Width Stand  Foam  Busy  60 sec   Hip Width         Bosu  Disco ball  1 min x 3            Ther Ex HR 75-98s   SpO2  97-99%  HR 80s, 98% on RA HR   SpO2 98-99% on RA     Aerobic TM  0%/1 2  8 min x 1  RPE 1-2    3 mins at 0%  1 2-1 5 rpms  RPE 2-3    3 mins at 2%  1 5  RPE: 2-3    3 mins at 5%  RPE: 3-4    3 mins at 5%  1 2   RPE: 2-3    5 mins cool down 5-0%  1 2 to 0 5  RPE: 1-2   TM  5% - 3 mins  1 4 mph    4% - 3 mins  1 7 mph    3% - 3 mins  2 1 mph    2% - 3 mins  2 2 mph    1% - 3 mins  2 4 mph    0% - 3 mins  2 5 mph    Cool down - 2 mins     TM  5% - 3 mins  1 4 mph    4% - 3 mins  1 7 mph    3% - 3 mins  2 1 mph    2% - 3 mins  2 2 mph    1% - 3 mins  2 4 mph    0% - 3 mins  2 4 mph    Cool down - 2 mins TM  5% - 3 mins  1 4 mph    4% - 3 mins  1 7 mph    3% - 3 mins  2 1 mph    2% - 3 mins  2 2 mph    1% - 3 mins  2 4 mph    0% - 3 mins  2 5 mph    Cool down - 2 mins    HR   SpO2 98% on RA throughout    SLR x 4        HR/TR        Mini Squats        Knee flex/ext mach  22#  2x10  RPE 2-3    22#  2x10  RPE 2-3 22#  2x10  RPE 2-3  Extn  33#  2x10  RPE 2-3 22#  2x10  RPE 2-3  Extn  33#  2x10  RPE 2-3    Leg Press  2x10  75x  RPE 2 2x10  75#  RPE 2 unavailable    Prayer stretch        LTR        Ther Activity                        Gait Training                        Modalities

## 2021-05-27 ENCOUNTER — RA CDI HCC (OUTPATIENT)
Dept: OTHER | Facility: HOSPITAL | Age: 41
End: 2021-05-27

## 2021-05-27 ENCOUNTER — OFFICE VISIT (OUTPATIENT)
Dept: OCCUPATIONAL THERAPY | Facility: CLINIC | Age: 41
End: 2021-05-27
Payer: COMMERCIAL

## 2021-05-27 ENCOUNTER — OFFICE VISIT (OUTPATIENT)
Dept: PHYSICAL THERAPY | Facility: CLINIC | Age: 41
End: 2021-05-27
Payer: COMMERCIAL

## 2021-05-27 DIAGNOSIS — R29.898 WEAKNESS OF BOTH LOWER EXTREMITIES: ICD-10-CM

## 2021-05-27 DIAGNOSIS — U07.1 COVID-19 VIRUS DETECTED: Primary | ICD-10-CM

## 2021-05-27 DIAGNOSIS — Z74.09 DECREASED FUNCTIONAL MOBILITY AND ENDURANCE: ICD-10-CM

## 2021-05-27 PROCEDURE — 97530 THERAPEUTIC ACTIVITIES: CPT

## 2021-05-27 PROCEDURE — 97110 THERAPEUTIC EXERCISES: CPT

## 2021-05-27 PROCEDURE — 97110 THERAPEUTIC EXERCISES: CPT | Performed by: PHYSICAL THERAPIST

## 2021-05-27 PROCEDURE — 97112 NEUROMUSCULAR REEDUCATION: CPT | Performed by: PHYSICAL THERAPIST

## 2021-05-27 NOTE — PROGRESS NOTES
[facility]  coding opportunities          Chart reviewed, no opportunity found: CHART REVIEWED, NO OPPORTUNITY FOUND              Patients insurance company: [employer] (Medicare Advantage and Commercial)

## 2021-05-27 NOTE — PROGRESS NOTES
Daily Note     Today's date: 2021  Patient name: Nicky Suarez  : 1980  MRN: [id number]  Referring provider: GABRIELA Ashraf  Dx:   Encounter Diagnosis     ICD-10-CM    1  COVID-19 virus detected  U07 1                   Subjective: "I feel pretty good "      Objective: See treatment diary below      Assessment: Tolerated treatment well  Patient exhibited good technique with therapeutic exercises and would benefit from continued OT  Patient only took two seated rest breaks throughout session  Patient will be decreased to one time a week for PT, OT and two times a week for speech to focus on cognitive deficits  Patient has made significant progress in endurance and activity tolerance since start of care  Patient in agreement with transition from twice a week for all three disciplines  Plan: Continue per plan of care  Progress treatment as tolerated          Vitals 2021   Pre Treatment SpO2 - 98  HR - 87  SpO2 -98  HR -70 SpO2-97%  HR 81 SpO2 - 98%  HR - 80 spO2 - 98% HR - 80   During Treatment SpO2 - 98%  HR - 96 SpO2 - 96%  HR -84 SpO2 98%  HR-77 SpO2 - 98%  HR - 89 SpO2 - 93% HR 78   Post Treatment SpO2 - 98%  HR - 79 SpO2 -97  HR -80 SpO2 98%  HR 70 SpO2 - 98%  Hr - 77 SpO2 -             Neuro Re-Ed                                                                        Ther Ex 2021   Thera-Band  Sh Ext       Triceps Ext       Retraction         Biceps Curl       Red - standing  X 20    X 20      X 20        X 20 Red standing  X 20      X 20      X 20        X 20  Green  X 20 on Peanut Ball    X 20 Peanut Ball    Red x 20 Peanut Ball      Red x 20  Peanut Ball   Digi-Flex  Blue x 20 Blue x 20     UBE Machine  L 55  Completed 10 Min - no breaks  96% 75HR   L 55   5 Min Stand   5 Min Seat  1 break  98%  79HR   L 50 - Seated  10 Min  No break  98% 77HR  + Cog Task Completed standing   7 Min 93% 78HR  3 Min   Nu Step   10 Min L 3  97%   HR 75     Thera-Ball  Forward Flex    Rotation    Diagonal    Step/Bounce  Yellow  X 20  97% 80HR  X 20  98% 83HR  X 20  98% 8          52 Card Exercise Completed 44 Cards  15 cards  Break  12 cards  Break  8 cards  Break  9 cards                         Ther Activity 05/11/2021 05/13/2021 5/18/2021 05/20/2021 05/27/2021   Grooved Peg Board     R 1:25 47  L 1:35 47   Tension Pin Pom Pom  Red - around the world 7/8 Red-   [facility] 7/8  [place] 6/8       [facility] Peg [facility]        Brain Box w/ [employer] Activity        Laundry Management  collected 10# w/ basket      Dynavision  A      A Endurance  B - 50% Green          A - Addition   43/1 40  40/1 50 w/ naming ice cream flavors(11)  R/R17/17/1 57  21/21/1 44  L/G18/18/1 77  21/21/1 44  Standing on aeromat  41/1 46  Miss 1  43/1 30  [place] on Foam    R/R 20/20/1 48  L/G 20/20/1 52  On foam    42/1 43  Miss 2  43/1 40  Miss 0  On Foam   Tension Pin on Pole        Large Peg Board     W/ Gripper  Half R  Half L   Black Peg Board  Tweezer  X 15 L  X 15 R      Multi-Matrix Super Saccades        Standing kitchen recipe following        Organize The Hour    Completed     Modalities 05/11/2021 05/13/2021 5/18/2021 05/20/2021 05/27/2021

## 2021-06-01 ENCOUNTER — TELEPHONE (OUTPATIENT)
Dept: NEUROLOGY | Facility: CLINIC | Age: 41
End: 2021-06-01

## 2021-06-01 ENCOUNTER — OFFICE VISIT (OUTPATIENT)
Dept: OCCUPATIONAL THERAPY | Facility: CLINIC | Age: 41
End: 2021-06-01
Payer: COMMERCIAL

## 2021-06-01 ENCOUNTER — OFFICE VISIT (OUTPATIENT)
Dept: SPEECH THERAPY | Facility: CLINIC | Age: 41
End: 2021-06-01
Payer: COMMERCIAL

## 2021-06-01 DIAGNOSIS — U07.1 COVID-19 VIRUS DETECTED: Primary | ICD-10-CM

## 2021-06-01 PROCEDURE — 97530 THERAPEUTIC ACTIVITIES: CPT

## 2021-06-01 PROCEDURE — 92507 TX SP LANG VOICE COMM INDIV: CPT

## 2021-06-01 NOTE — PROGRESS NOTES
Daily Note     Today's date: 2021  Patient name: Belinda Mairn  : 1980  MRN: [id number]  Referring provider: GABRIELA North  Dx:   Encounter Diagnosis     ICD-10-CM    1  COVID-19 virus detected  U07 1                   Subjective: "That was tough "       Objective: See treatment diary below      Assessment: Tolerated treatment well  Patient exhibited good technique with therapeutic exercises and would benefit from continued OT  Patient completed table top divided attention activity  Patient completed cancellation worksheet of crossing out numbers that repeat themselves four times in each row  After each row, patient transitioned into word find within [place] worksheet with corresponding number  Patient completed cancellation with one error noted  Patient did not have errors on word find worksheet  Patient than completed cardio activity of upright bike with completed of #5 The Adin Pests with 100% accurate  Plan: Continue per plan of care  Progress treatment as tolerated         Vitals 2021       Pre Treatment SpO2 - 98  HR - 87        During Treatment SpO2 - 98%  HR - 96       Post Treatment SpO2 - 97%  HR - 84                Neuro Re-Ed                                                                        Ther Ex 2021       Thera-Band  Sh Ext       Triceps Ext       Retraction         Biceps Curl             Digi-Flex        UBE Machine        Nu Step        Thera-Ball  Forward Flex    Rotation    Diagonal    Step/Bounce        52 Card Exercise        Upright Bike  Executive Function Worksheet #5 100%                Ther Activity 2021       Grooved Peg Board        Tension Pin Pom Pom        [place] Peg Board        Brain Box w/ FM Activity        Laundry Management        Dynavision  A      A Endurance  B - 50% Green          A - Addition        Tension Pin on Pole        Large Peg Board        Black Peg Board        Multi-Matrix Super Saccades        Standing kitchen recipe following        Organize The Hour        Divided Attention Activity Cancellation worksheet on table top w/ super set of word find in [place]               Modalities 06/01/2021

## 2021-06-01 NOTE — TELEPHONE ENCOUNTER
Best contact number for [id number]:[phone]    Emergency Contact name and number:None    Referring provider and telephone number:[facility]     Primary Care Provider Name and if affiliated with [facility]: Magdalena Pickard     Reason for Appointment/Dx:Covid [address]         Neurology Location patient would like to be seen:[place]     Order received? Yes                                                Records Received? No     Have you ever seen another Neurologist? No     Insurance Information    Insurance Name:[employer]     ID/Policy #:    Secondary Insurance:    ID/Policy#: Workman's Comp/ Accident/ School  Information      Workman's Comp/Accident/School related?        None     If yes name of Insurance company:    Claim #:    Date of Injury:    Type of Injury:     Name and Telephone Number:    Notes: Appointment schedule with patient new patient pack sent                    Appointment date: 09- 8:00am with Dr Beena Diehl

## 2021-06-01 NOTE — PROGRESS NOTES
Speech-Language Pathology Treatment Note    Today's date: 2021  Patient name: Fantasma Davis  : 1980  MRN: [id number]  Referring provider: GABRIELA Eng  Dx:   Encounter Diagnosis     ICD-10-CM    1  COVID-19 virus detected  U07 1      Medical History significant for:   Past Medical History:   Diagnosis Date    COVID-19 2021    Disease of thyroid gland     Hypertension      Flowsheet:  Start Time: 0800  Stop Time: 0900  Total time in clinic (min): 60 minutes    Subjective:  Patient arrived on time to session today following OT  She reports that she was working on visual scanning and developed some eye tremor during that activity  She was able to tolerate a family road trip without motion sickness but does report inability to look at her phone while in the car and "I have trouble seeing stuff passing by, I had to sit there and close my eyes "    She reports that work has been "more of the same" in regards to workload patient reports that she continues to fall behind without extensive work support  Encouraged to complete work in a more timely manner despite symptoms  Patient also reporting incidents at home involving inability to recall items being in the oven  Objective:  1  Pt will complete mental manipulation tasks of up to 4 words and 4 numbers @ 80% accuracy min-mod cue   :  FWD @ 100% acc i'ly, BWD @ 100%, ABC @ 100%  : rescramble 5 words into sentences 100% ketan, 4 words ranking 1/3 trials ketan ( verbalization) -- discontinued task following third trial : 4 words BWD @ 88% acc ketan  : 4 words ranked over 4/5 trial, difficulty with grandfather set (symptoms- head pressure/eye twitch)  2   Patient will complete deductive reasoning problems with increased complexity and decreased prompt from min cue to independently @ 100% acc over 4 weeks  Goal advanced below, see goal #4      3   Pt will answer "wh" questions in response to paragraphs read to self and read outloud by clinician @ 80% ketan  5/17: Wh- questions, paragraph read outloud by clinician @ 88% acc ketan, increasing to 100% w/ repetition  5/19 Paragraph read out loud by clinician, answered questions @ 80% acc ketan, both eyes twitching after "[facility]" questions  4   Pt will complete high level multi-tier deductive reasoning puzzles with no more than 2 errors per puzzle  4/28 completed puzzle number 5, 5x4 with 11 clues completed @ 100% min cue   5/10:   Calendar, Frank's closet, dog breed, couples, house, puzzles 1-6 from [facility] all @ 100% acc i'ly  5/12 completed puzzle #4 [facility] 5 min 100% ketan  Advanced up to digging logic #2 gracia flowers completed together but very minimal cue needed  She was able to carryover learned information to complete digging logic puzzle #5 "picky pests" @ with min cue 1x completed 10 min  5/17:  Digging Logic puzzle 3 completed @ 100% acc with min-mod cue, puzzle 4 completed @ 50% acc increased to 100% acc when instructed to re-attempt  5/26 puzzle #7 digging logic completed @ 100% ketan in 4 min  6/1:  [place] Levels 2, 6, 11 completed @ 100% acc i'ly  5   Patient will complete rapid naming tasks with familliar pictures increasing processing speed throughout  4/26:  Named "50+" items in 5 minutes  6   Patient will name atleast 10 items in an abstract category independently in less than 2 minutes  4/19:  Presidents 16 (1 minute), kahlil stones 9, things that reflect light 6 4/26:  Bodies of water 13, furniture 11, occupations 20, trees 10 5/5 cold: 1:10 named 10 items with min cue  Hot: 12 items in 1 min  Yellow: 10 items in 1 min  (Teaching subcategories was helpful for her ) 5/17: Things that use batteries 10/10 ketan, team sports 10/10 ketan, things you wear on your feet 10/10 ketan, school subjects 10/10 ketan, and famous last names 10/10 w/ mod  cueing  Difficulty w/ famous last names due to broad nature    6/1:  Dog breeds 14, sources of light 8, items in a kitchen 18, types of breads 9    7  Patient will complete various sustained and divided attention tasks for 2+ minutes while attending to auditory/visual stimuli with task average decreasing closer to the average range  4/21 blink cards sorting color/shape 3:57 no errors  ( utilized visual cue and self verbalization cueing to remind self of category- this helped keep her attention and reduce uncontrollable laughter)  Sort color/shape/number 2:38 no errors ( visual cue + verbalization cue)  4/28 during IM 2 min task, patient OOB and rest needed due to endurance between reps  Standing and switching hand positioning  Date Task type Task Average  SRO Early  Late  Variance    4/21 Short form task 1  Task 2  2 min visual BH 38   98  45 12  4  17 38  98  83 62  2  17 45  68  41   4/28 BH 2 min vis  " 51  67 17  9 72  89 28  11 57  43   5/12  BH 2 min vis   BH 2 min vis  73  42 17  27 55  76 45  24 96  45   5/19 BH 2min vis   " 34  38 27  21 61  75 39  25 39  38   5/26 BH 1min vis warm up  "BH 2min vis+guide  "ABC/123  " categories  41  64  87  67   25  12  8  15 71  79  67  71 29  21  33  29 46  66  100  75                       8   Pt will utilize atleast 2 word finding strategies in conversation in and out of session ketan  4/19: Word finding strategies provided  4/26:  "Hit or miss" pt reports drafting emails and not sending them  WF in session x2 SC  5/4:  No WF in session today  5/5 WF x1 with picture naming ( fire extinguisher)  5/17:  No WF noted  5/19 No WF noted  9   Pt will utilize atleast 2 memory strategies in structured 3 word, # or picture immediate memory recall tasks with carryover at home  4/19:  Memory strategies provided  4/21 verbalization strategy taught in session and demonstrated effectiveness! 4/26:  Drafting emails and not sending them (attention)    5/4:  Pt reports creating a grocery store list and navigating the store by category until patient misplaced her list  5/5 4 pics recall 100% trials ketan ( verbalization/rehearsal x1-2)  6/1:  Patient reporting that she has begun misplacing items and is unable to locate where they are  Long Term Goals:  1  Pt will increase cognitive linguistic skills across all settings  2  Pt will increase physical and mental endurance across all settings  Assessment:  Patient reporting difficulty at home misplacing items, lapse in attention, inability to drive, etc   Very discouraged      Recommendations:  -Patient would benefit from outpatient skilled Speech Therapy services : Cognitive-Linguistic therapy   Recommend neurology consult as well as optometry    -Frequency: 2x weekly  -Duration: 4-6 weeks    -Intervention certification from: 9/63/7501  -Intervention certification to: 8/74/25    -Intervention comments: puzzles, interactive metronome, memory/word finding strategies    Visit: 14

## 2021-06-01 NOTE — TELEPHONE ENCOUNTER
lvm for patient to schedule sooner appt - availability with Dr Milagros Hernandez at [facility] 10 or 11 on 6/8 - please assist if still available

## 2021-06-02 PROBLEM — U09.9 COVID-19 LONG HAULER: Status: ACTIVE | Noted: 2021-06-02

## 2021-06-02 PROBLEM — E03.8 OTHER SPECIFIED HYPOTHYROIDISM: Status: ACTIVE | Noted: 2017-04-15

## 2021-06-03 ENCOUNTER — OFFICE VISIT (OUTPATIENT)
Dept: INTERNAL MEDICINE CLINIC | Facility: CLINIC | Age: 41
End: 2021-06-03
Payer: COMMERCIAL

## 2021-06-03 ENCOUNTER — OFFICE VISIT (OUTPATIENT)
Dept: SPEECH THERAPY | Facility: CLINIC | Age: 41
End: 2021-06-03
Payer: COMMERCIAL

## 2021-06-03 ENCOUNTER — OFFICE VISIT (OUTPATIENT)
Dept: PHYSICAL THERAPY | Facility: CLINIC | Age: 41
End: 2021-06-03
Payer: COMMERCIAL

## 2021-06-03 VITALS
HEIGHT: 63 IN | TEMPERATURE: 97.2 F | DIASTOLIC BLOOD PRESSURE: 70 MMHG | HEART RATE: 76 BPM | RESPIRATION RATE: 16 BRPM | SYSTOLIC BLOOD PRESSURE: 115 MMHG | OXYGEN SATURATION: 97 % | WEIGHT: 204.6 LBS | BODY MASS INDEX: 36.25 KG/M2

## 2021-06-03 DIAGNOSIS — U07.1 COVID-19 VIRUS INFECTION: ICD-10-CM

## 2021-06-03 DIAGNOSIS — U07.1 COVID-19 VIRUS DETECTED: Primary | ICD-10-CM

## 2021-06-03 DIAGNOSIS — R00.1: ICD-10-CM

## 2021-06-03 DIAGNOSIS — U09.9 COVID-19 LONG HAULER: Primary | ICD-10-CM

## 2021-06-03 DIAGNOSIS — R41.841 COGNITIVE COMMUNICATION DEFICIT: ICD-10-CM

## 2021-06-03 DIAGNOSIS — Z86.16 PERSONAL HISTORY OF COVID-19: ICD-10-CM

## 2021-06-03 DIAGNOSIS — E03.8 OTHER SPECIFIED HYPOTHYROIDISM: ICD-10-CM

## 2021-06-03 DIAGNOSIS — I10 ESSENTIAL HYPERTENSION: ICD-10-CM

## 2021-06-03 DIAGNOSIS — E66.09 CLASS 2 OBESITY DUE TO EXCESS CALORIES WITHOUT SERIOUS COMORBIDITY WITH BODY MASS INDEX (BMI) OF 35.0 TO 35.9 IN ADULT: ICD-10-CM

## 2021-06-03 DIAGNOSIS — R29.898 WEAKNESS OF BOTH LOWER EXTREMITIES: ICD-10-CM

## 2021-06-03 DIAGNOSIS — F41.1 GENERALIZED ANXIETY DISORDER: ICD-10-CM

## 2021-06-03 DIAGNOSIS — R41.840 ATTENTION AND CONCENTRATION DEFICIT: ICD-10-CM

## 2021-06-03 DIAGNOSIS — E07.9 THYROID DISEASE: ICD-10-CM

## 2021-06-03 PROBLEM — Z00.00 ANNUAL PHYSICAL EXAM: Status: ACTIVE | Noted: 2021-06-03

## 2021-06-03 PROBLEM — E66.812 CLASS 2 OBESITY DUE TO EXCESS CALORIES WITHOUT SERIOUS COMORBIDITY WITH BODY MASS INDEX (BMI) OF 35.0 TO 35.9 IN ADULT: Status: ACTIVE | Noted: 2020-07-08

## 2021-06-03 PROCEDURE — 92507 TX SP LANG VOICE COMM INDIV: CPT | Performed by: NURSE PRACTITIONER

## 2021-06-03 PROCEDURE — 97110 THERAPEUTIC EXERCISES: CPT | Performed by: PHYSICAL THERAPIST

## 2021-06-03 PROCEDURE — 97112 NEUROMUSCULAR REEDUCATION: CPT | Performed by: PHYSICAL THERAPIST

## 2021-06-03 PROCEDURE — 3008F BODY MASS INDEX DOCD: CPT | Performed by: PSYCHIATRY & NEUROLOGY

## 2021-06-03 PROCEDURE — 99213 OFFICE O/P EST LOW 20 MIN: CPT | Performed by: NURSE PRACTITIONER

## 2021-06-03 RX ORDER — ALPRAZOLAM 0.25 MG/1
0.25 TABLET ORAL 2 TIMES DAILY PRN
Qty: 60 TABLET | Refills: 0 | Status: SHIPPED | OUTPATIENT
Start: 2021-06-03 | End: 2021-08-05 | Stop reason: SDUPTHER

## 2021-06-03 RX ORDER — MULTIVIT WITH MINERALS/LUTEIN
1000 TABLET ORAL DAILY
COMMUNITY

## 2021-06-03 RX ORDER — LEVOTHYROXINE SODIUM 0.1 MG/1
100 TABLET ORAL DAILY
Qty: 90 TABLET | Refills: 3 | Status: SHIPPED | OUTPATIENT
Start: 2021-06-03 | End: 2021-08-05 | Stop reason: SDUPTHER

## 2021-06-03 RX ORDER — ZINC SULFATE 50(220)MG
220 CAPSULE ORAL DAILY
COMMUNITY

## 2021-06-03 RX ORDER — DEXTROAMPHETAMINE SACCHARATE, AMPHETAMINE ASPARTATE MONOHYDRATE, DEXTROAMPHETAMINE SULFATE AND AMPHETAMINE SULFATE 1.25; 1.25; 1.25; 1.25 MG/1; MG/1; MG/1; MG/1
5 CAPSULE, EXTENDED RELEASE ORAL EVERY MORNING
Qty: 30 CAPSULE | Refills: 0 | Status: SHIPPED | OUTPATIENT
Start: 2021-06-03 | End: 2021-06-30 | Stop reason: SDUPTHER

## 2021-06-03 RX ORDER — METOPROLOL SUCCINATE 50 MG/1
50 TABLET, EXTENDED RELEASE ORAL DAILY
Qty: 90 TABLET | Refills: 3 | Status: SHIPPED | OUTPATIENT
Start: 2021-06-03 | End: 2022-07-29

## 2021-06-03 NOTE — PROGRESS NOTES
Assessment/Plan:    Problem List Items Addressed This Visit        Endocrine    Other specified hypothyroidism     · Continue Synthroid         Relevant Medications    levothyroxine 100 mcg tablet    metoprolol succinate (TOPROL-XL) 50 mg 24 hr tablet       Cardiovascular and Mediastinum    Essential hypertension     · Continue metoprolol         Relevant Medications    metoprolol succinate (TOPROL-XL) 50 mg 24 hr tablet    Regular sinus bradycardia     · Resolved          Relevant Medications    metoprolol succinate (TOPROL-XL) 50 mg 24 hr tablet       Other    Class 2 obesity due to excess calories without serious comorbidity with body mass index (BMI) of 35 0 to 35 9 in adult     · Lifestyle modification, diet and exercise discussed         Personal history of COVID-19    COVID-19 long hauler - Primary     · Patient has been following with PT OT neurology speech  · Slowly improving   · Neurology in June, 8 Dr Can Gilmore  · Continues with decreased concentration    Speech:     Pt will complete mental manipulation tasks of up to 4 words and 4 numbers @ 80% accuracy min-mod cue   4/19:  FWD @ 100% acc i'ly, BWD @ 100%, ABC @ 100%  5/5: rescramble 5 words into sentences 100% ketan, 4 words ranking 1/3 trials ketan ( verbalization) -- discontinued task following third trial 5/17: 4 words BWD @ 88% acc ketan  5/19: 4 words ranked over 4/5 trial, difficulty with grandfather set (symptoms- head pressure/eye twitch)  6/3 3 numbers bwd with metronome @ 90% ketan       2   Patient will complete deductive reasoning problems with increased complexity and decreased prompt from min cue to independently @ 100% acc over 4 weeks  Goal advanced below, see goal #4      3  Pt will answer "wh" questions in response to paragraphs read to self and read outloud by clinician @ 80% ketan  5/17: Wh- questions, paragraph read outloud by clinician @ 88% acc ketan, increasing to 100% w/ repetition   5/19 Paragraph read out loud by clinician, answered questions @ 80% acc ketan, both eyes twitching after "[address]" questions      4  Pt will complete high level multi-tier deductive reasoning puzzles with no more than 2 errors per puzzle  4/28 completed puzzle number 5, 5x4 with 11 clues completed @ 100% min cue   5/10:   Calendar, Frank's closet, dog breed, couples, house, puzzles 1-6 from [facility] all @ 100% acc i'ly  5/12 completed puzzle #4 [employer] 5 min 100% ketan  Advanced up to digging logic #2 gracia flowers completed together but very minimal cue needed  She was able to carryover learned information to complete digging logic puzzle #5 "picky pests" @ with min cue 1x completed 10 min  5/17:  Digging Logic puzzle 3 completed @ 100% acc with min-mod cue, puzzle 4 completed @ 50% acc increased to 100% acc when instructed to re-attempt  5/26 puzzle #7 digging logic completed @ 100% ketan in 4 min  6/1:  Rush Hour Levels 2, 6, 11 completed @ 100% acc i'ly      5  Patient will complete rapid naming tasks with familliar pictures increasing processing speed throughout  4/26:  Named "50+" items in 5 minutes      6   Patient will name atleast 10 items in an abstract category independently in less than 2 minutes  4/19:  Presidents 16 (1 minute), kahlil stones 9, things that reflect light 6 4/26:  Bodies of water 13, furniture 11, occupations 20, trees 10 5/5 cold: 1:10 named 10 items with min cue  Hot: 12 items in 1 min  Yellow: 10 items in 1 min  (Teaching subcategories was helpful for her ) 5/17: Things that use batteries 10/10 ketan, team sports 10/10 ketan, things you wear on your feet 10/10 ketan, school subjects 10/10 ketan, and famous last names 10/10 w/ mod  cueing  Difficulty w/ famous last names due to broad nature    6/1:  Dog breeds 14, sources of light 8, items in a kitchen 18, types of breads 9 6/3 abstract categories with metronome naming 3-6 items on average       7   Patient will complete various sustained and divided attention tasks for 2+ minutes while attending to auditory/visual stimuli with task average decreasing closer to the average range  4/21 blink cards sorting color/shape 3:57 no errors  ( utilized visual cue and self verbalization cueing to remind self of category- this helped keep her attention and reduce uncontrollable laughter)  Sort color/shape/number 2:38 no errors ( visual cue + verbalization cue)  4/28 during IM 2 min task, patient OOB and rest needed due to endurance between reps  Standing and switching hand positioning  PT:  Subjective: Reports she is doing okay, still gets distracted at work sometimes           Objective: See treatment diary below        Assessment: Tolerated treatment well  Patient demonstrated fatigue post treatment and would benefit from continued PT  Patient requires close supervision for Bosu challenge, progressing steadily otherwise         Plan: Continue per plan of care  Precautions: falls, monitor vitals  Re-eval Date: 6/2/2021                Attention and concentration deficit     · Start adderall 5mg XR  · Neurology appointment on 6/8/2021         Relevant Medications    amphetamine-dextroamphetamine (ADDERALL XR) 5 MG 24 hr capsule    Generalized anxiety disorder     · Continue xanax as a prn         Relevant Medications    ALPRAZolam (XANAX) 0 25 mg tablet    amphetamine-dextroamphetamine (ADDERALL XR) 5 MG 24 hr capsule      Other Visit Diagnoses     COVID-19 virus infection        Relevant Medications    ALPRAZolam (XANAX) 0 25 mg tablet    Thyroid disease        TSH elevated >4 0  levothyroxine increased to 100mcg  TSH repeat in 6 weeks    Relevant Medications    levothyroxine 100 mcg tablet    metoprolol succinate (TOPROL-XL) 50 mg 24 hr tablet         Diagnoses and all orders for this visit:    COVID-19 long hauler    Other specified hypothyroidism    Essential hypertension  -     metoprolol succinate (TOPROL-XL) 50 mg 24 hr tablet;  Take 1 tablet (50 mg total) by mouth daily    Regular sinus bradycardia    Class 2 obesity due to excess calories without serious comorbidity with body mass index (BMI) of 35 0 to 35 9 in adult    Personal history of COVID-19    COVID-19 virus infection  -     ALPRAZolam (XANAX) 0 25 mg tablet; Take 1 tablet (0 25 mg total) by mouth 2 (two) times a day as needed for anxiety    Attention and concentration deficit  -     amphetamine-dextroamphetamine (ADDERALL XR) 5 MG 24 hr capsule; Take 1 capsule (5 mg total) by mouth every morningMax Daily Amount: 5 mg    Thyroid disease  Comments:  TSH elevated >4 0  levothyroxine increased to 100mcg  TSH repeat in 6 weeks  Orders:  -     levothyroxine 100 mcg tablet; Take 1 tablet (100 mcg total) by mouth daily    Essential hypertension  Comments:  Bp 110/74  Metoprolol reordered  Orders:  -     metoprolol succinate (TOPROL-XL) 50 mg 24 hr tablet; Take 1 tablet (50 mg total) by mouth daily    Generalized anxiety disorder    Other orders  -     zinc sulfate (ZINCATE) 220 mg capsule; Take 220 mg by mouth daily  -     Ascorbic Acid (vitamin C) 1000 MG tablet; Take 1,000 mg by mouth daily     Other specified hypothyroidism  · Continue Synthroid    Essential hypertension  · Continue metoprolol    Annual physical exam  · Lifestyle modification, diet and exercise discussed  · Medications discussed  · Labs discussed  · BMI discussed  · Depression screening performed  · COVID-19 long hauler discussed    COVID-19 long hauler  · Patient has been following with PT OT neurology speech  · Slowly improving   · Neurology in June, 8 Dr Maryan Gupta  · Continues with decreased concentration    Speech:     Pt will complete mental manipulation tasks of up to 4 words and 4 numbers @ 80% accuracy min-mod cue   4/19:  FWD @ 100% acc i'ly, BWD @ 100%, ABC @ 100%  5/5: rescramble 5 words into sentences 100% ketan, 4 words ranking 1/3 trials ketan ( verbalization) -- discontinued task following third trial 5/17: 4 words BWD @ 88% acc ketan    5/19: 4 words ranked over 4/5 trial, difficulty with grandfather set (symptoms- head pressure/eye twitch)  6/3 3 numbers bwd with metronome @ 90% ketan       2   Patient will complete deductive reasoning problems with increased complexity and decreased prompt from min cue to independently @ 100% acc over 4 weeks  Goal advanced below, see goal #4      3  Pt will answer "wh" questions in response to paragraphs read to self and read outloud by clinician @ 80% ketan  5/17: Wh- questions, paragraph read outloud by clinician @ 88% acc ketan, increasing to 100% w/ repetition  5/19 Paragraph read out loud by clinician, answered questions @ 80% acc ketan, both eyes twitching after "[facility]" questions      4  Pt will complete high level multi-tier deductive reasoning puzzles with no more than 2 errors per puzzle  4/28 completed puzzle number 5, 5x4 with 11 clues completed @ 100% min cue   5/10:   Calendar, Frank's closet, dog breed, couples, house, puzzles 1-6 from [facility] all @ 100% acc i'ly  5/12 completed puzzle #4 [employer] 5 min 100% ketan  Advanced up to digging logic #2 gracia flowers completed together but very minimal cue needed  She was able to carryover learned information to complete digging logic puzzle #5 "picky pests" @ with min cue 1x completed 10 min  5/17:  Digging Logic puzzle 3 completed @ 100% acc with min-mod cue, puzzle 4 completed @ 50% acc increased to 100% acc when instructed to re-attempt  5/26 puzzle #7 digging logic completed @ 100% ketan in 4 min  6/1:  Rush Hour Levels 2, 6, 11 completed @ 100% acc i'ly      5  Patient will complete rapid naming tasks with familliar pictures increasing processing speed throughout  4/26:  Named "50+" items in 5 minutes      6   Patient will name atleast 10 items in an abstract category independently in less than 2 minutes  4/19:  Presidents 16 (1 minute), kahlil stones 9, things that reflect light 6 4/26:  Bodies of water 13, furniture 11, occupations 20, trees 10 5/5 cold: 1:10 named 10 items with min cue   Hot: 12 items in 1 min  Yellow: 10 items in 1 min  (Teaching subcategories was helpful for her ) 5/17: Things that use batteries 10/10 ketan, team sports 10/10 ketan, things you wear on your feet 10/10 ketan, school subjects 10/10 ketan, and famous last names 10/10 w/ mod  cueing  Difficulty w/ famous last names due to broad nature  6/1:  Dog breeds 14, sources of light 8, items in a kitchen 18, types of breads 9 6/3 abstract categories with metronome naming 3-6 items on average       7   Patient will complete various sustained and divided attention tasks for 2+ minutes while attending to auditory/visual stimuli with task average decreasing closer to the average range  4/21 blink cards sorting color/shape 3:57 no errors  ( utilized visual cue and self verbalization cueing to remind self of category- this helped keep her attention and reduce uncontrollable laughter)  Sort color/shape/number 2:38 no errors ( visual cue + verbalization cue)  4/28 during IM 2 min task, patient OOB and rest needed due to endurance between reps  Standing and switching hand positioning  PT:  Subjective: Reports she is doing okay, still gets distracted at work sometimes           Objective: See treatment diary below        Assessment: Tolerated treatment well  Patient demonstrated fatigue post treatment and would benefit from continued PT  Patient requires close supervision for Bosu challenge, progressing steadily otherwise         Plan: Continue per plan of care     Precautions: falls, monitor vitals  Re-eval Date: 6/2/2021           Class 2 obesity due to excess calories without serious comorbidity with body mass index (BMI) of 35 0 to 35 9 in adult  · Lifestyle modification, diet and exercise discussed    Attention and concentration deficit  · Start adderall 5mg XR  · Neurology appointment on 6/8/2021    Regular sinus bradycardia  · Resolved     Generalized anxiety disorder  · Continue xanax as a prn      Subjective:      Patient ID: Sohail Curry is a 38 y o  female  Patient is here for follow-up appointment  Patient has been considered COVID-19 long hawler  Patient has been consistent with treatment plan put forth for our COVID-19 long haul patient's  She has been following with PT OT Neurology and speech  Her most recent speech appointment was prior to this appointment as well as a physical therapy occupational therapy evaluation  Speech stated that patient does have continued issues with concentration, she does have and eye twitch, and has been found to have bilateral hand tremors  Speech therapy does recommend continued cognitive-linguistic therapy  They also recommend Neurology consult as well as optometry  Physical therapy reports that the patient is doing improved but does have significant distractions  She does demonstrate fatigue after treatment and would benefit from continued physical therapy with close supervision for Adriana puckett, otherwise progressing steadily per physical therapy  Patient does have a neurology appointment scheduled for 2021  Patient's complaint is continued decreased ability to concentrate  At this time I will start the patient on Adderall 5 mg extended release  We will have Neurology discuss this medication as well to see if it does help with any improvement  All of the other patients and her  who is at the appointment as well, their questions were answered to their satisfaction                    The following portions of the patient's history were reviewed and updated as appropriate:   Past Medical History:  She has no past medical history on file ,  _______________________________________________________________________  Medical Problems:  does not have any pertinent problems on file ,  _______________________________________________________________________  Past Surgical History:   has a past surgical history that includes  section and Hysterectomy  ,  _______________________________________________________________________  Family History:  family history includes Asthma in her father; Hypertension in her father and mother; Muscular dystrophy in her mother; Raynaud syndrome in her sister  ,  _______________________________________________________________________  Social History:   reports that she quit smoking about 18 years ago  Her smoking use included cigarettes  She has never used smokeless tobacco  She reports current alcohol use of about 1 0 - 2 0 standard drinks of alcohol per week  She reports that she does not use drugs  ,  _______________________________________________________________________  Allergies:  is allergic to nuts - food allergy; amoxicillin; penicillins; and sulfamethoxazole-trimethoprim     _______________________________________________________________________  Current Outpatient Medications   Medication Sig Dispense Refill    ALPRAZolam (XANAX) 0 25 mg tablet Take 1 tablet (0 25 mg total) by mouth 2 (two) times a day as needed for anxiety 60 tablet 0    amphetamine-dextroamphetamine (ADDERALL XR) 5 MG 24 hr capsule Take 1 capsule (5 mg total) by mouth every morningMax Daily Amount: 5 mg 30 capsule 0    Ascorbic Acid (vitamin C) 1000 MG tablet Take 1,000 mg by mouth daily      levothyroxine 100 mcg tablet Take 1 tablet (100 mcg total) by mouth daily 90 tablet 3    metoprolol succinate (TOPROL-XL) 50 mg 24 hr tablet Take 1 tablet (50 mg total) by mouth daily 90 tablet 3    Multiple Vitamins-Minerals (MULTIVITAL PO) Take 1 capsule by mouth daily        zinc sulfate (ZINCATE) 220 mg capsule Take 220 mg by mouth daily       No current facility-administered medications for this visit       _______________________________________________________________________  Review of Systems   Constitutional: Positive for fatigue  Negative for activity change, chills and fever  HENT: Negative for rhinorrhea and sore throat      Eyes: Negative for pain  Occasional eye twitching   Respiratory: Negative for cough and shortness of breath  Improved shortness of breath   Cardiovascular: Negative for chest pain, palpitations and leg swelling  Sinus bradycardia has resolved   Gastrointestinal: Negative for abdominal pain, constipation, diarrhea, nausea and vomiting  Genitourinary: Negative for difficulty urinating, flank pain, frequency and urgency  Musculoskeletal: Negative for gait problem, joint swelling and myalgias  Improving physical condition   Skin: Negative for color change  Neurological: Positive for tremors (Bilateral hand tremors) and headaches  Negative for dizziness, weakness and light-headedness  Continues with decreased concentration   Psychiatric/Behavioral: Positive for decreased concentration  Negative for sleep disturbance  The patient is not nervous/anxious  All other systems reviewed and are negative  Objective:  Vitals:    06/03/21 0811   BP: 115/70   BP Location: Left arm   Patient Position: Sitting   Cuff Size: Standard   Pulse: 76   Resp: 16   Temp: (!) 97 2 °F (36 2 °C)   SpO2: 97%   Weight: 92 8 kg (204 lb 9 6 oz)   Height: 5' 3" (1 6 m)     Body mass index is 36 24 kg/m²  Physical Exam  Vitals signs reviewed  Constitutional:       General: She is awake  Appearance: Normal appearance  She is well-developed and overweight  HENT:      Head: Normocephalic and atraumatic  Nose: Nose normal       Mouth/Throat:      Mouth: Mucous membranes are moist    Eyes:      Extraocular Movements: Extraocular movements intact  Comments: Patient noted by speech as having occasional abnormal eye movements  Neck:      Musculoskeletal: Normal range of motion  Cardiovascular:      Rate and Rhythm: Normal rate and regular rhythm  Pulses: Normal pulses  Heart sounds: Normal heart sounds     Pulmonary:      Effort: Pulmonary effort is normal       Breath sounds: Normal breath sounds  Abdominal:      General: Bowel sounds are normal       Palpations: Abdomen is soft  Musculoskeletal: Normal range of motion  Right lower leg: No edema  Left lower leg: No edema  Comments: Generalized weakness has improved   Skin:     General: Skin is warm and dry  Neurological:      Mental Status: She is alert and oriented to person, place, and time  Motor: Tremor present  Comments: Patient has been noticed to have bilateral hand tremors   Psychiatric:         Attention and Perception: Attention normal          Mood and Affect: Mood normal          Speech: Speech normal          Behavior: Behavior normal  Behavior is cooperative        Comments: Patient continues to have issues with chronic decreased concentration           PHQ-9 Depression Screening    PHQ-9:   Frequency of the following problems over the past two weeks:      Little interest or pleasure in doing things: 0 - not at all  Feeling down, depressed, or hopeless: 0 - not at all

## 2021-06-03 NOTE — PROGRESS NOTES
Speech-Language Pathology Treatment Note    Today's date: 6/3/2021  Patient name: Hussain Rivero  : 1980  MRN: [id number]  Referring provider: GABRIELA Sloan  Dx:   No diagnosis found  Medical History significant for:   No past medical history on file  Flowsheet:  Start Time: 715  Stop Time: 0800  Total time in clinic (min): 45 minutes    Subjective:  Patient arrived on time to session today Co-treat with PT       Objective:  1  Pt will complete mental manipulation tasks of up to 4 words and 4 numbers @ 80% accuracy min-mod cue   :  FWD @ 100% acc i'ly, BWD @ 100%, ABC @ 100%  : rescramble 5 words into sentences 100% ketan, 4 words ranking 1/3 trials ketan ( verbalization) -- discontinued task following third trial : 4 words BWD @ 88% acc ketan  : 4 words ranked over  trial, difficulty with grandfather set (symptoms- head pressure/eye twitch)  6/3 3 numbers bwd with metronome @ 90% ketan  2   Patient will complete deductive reasoning problems with increased complexity and decreased prompt from min cue to independently @ 100% acc over 4 weeks  Goal advanced below, see goal #4  3   Pt will answer "wh" questions in response to paragraphs read to self and read outloud by clinician @ 80% ketan  : Wh- questions, paragraph read outloud by clinician @ 88% acc ketan, increasing to 100% w/ repetition   Paragraph read out loud by clinician, answered questions @ 80% acc ketan, both eyes twitching after "[address]" questions  4   Pt will complete high level multi-tier deductive reasoning puzzles with no more than 2 errors per puzzle   completed puzzle number 5, 5x4 with 11 clues completed @ 100% min cue   5/10:   Calendar, Frank's closet, dog breed, couples, house, puzzles 1-6 from [facility] all @ 100% acc i'ly   completed puzzle #4 [facility] 5 min 100% ketan  Advanced up to digging logic #2 gracia flowers completed together but very minimal cue needed   She was able to carryover learned information to complete digging logic puzzle #5 "picky pests" @ with min cue 1x completed 10 min  5/17:  Digging Logic puzzle 3 completed @ 100% acc with min-mod cue, puzzle 4 completed @ 50% acc increased to 100% acc when instructed to re-attempt  5/26 puzzle #7 digging logic completed @ 100% ketan in 4 min  6/1:  Rush Hour Levels 2, 6, 11 completed @ 100% acc i'ly  5   Patient will complete rapid naming tasks with familliar pictures increasing processing speed throughout  4/26:  Named "50+" items in 5 minutes  6   Patient will name atleast 10 items in an abstract category independently in less than 2 minutes  4/19:  Presidents 16 (1 minute), kahlil stones 9, things that reflect light 6 4/26:  Bodies of water 13, furniture 11, occupations 20, trees 10 5/5 cold: 1:10 named 10 items with min cue  Hot: 12 items in 1 min  Yellow: 10 items in 1 min  (Teaching subcategories was helpful for her ) 5/17: Things that use batteries 10/10 ketan, team sports 10/10 ketan, things you wear on your feet 10/10 ketan, school subjects 10/10 ketan, and famous last names 10/10 w/ mod  cueing  Difficulty w/ famous last names due to broad nature  6/1:  Dog breeds 14, sources of light 8, items in a kitchen 18, types of breads 9 6/3 abstract categories with metronome naming 3-6 items on average  7   Patient will complete various sustained and divided attention tasks for 2+ minutes while attending to auditory/visual stimuli with task average decreasing closer to the average range  4/21 blink cards sorting color/shape 3:57 no errors  ( utilized visual cue and self verbalization cueing to remind self of category- this helped keep her attention and reduce uncontrollable laughter)  Sort color/shape/number 2:38 no errors ( visual cue + verbalization cue)  4/28 during IM 2 min task, patient OOB and rest needed due to endurance between reps  Standing and switching hand positioning       Date Task type Task Average  SRO Early  Late Variance    4/21 Short form task 1  Task 2  2 min visual BH 38   98  45 12  4  17 38  98  83 62  2  17 45  68  41   4/28 BH 2 min vis  " 51  67 17  9 72  89 28  11 57  43   5/12  BH 2 min vis   BH 2 min vis  73  42 17  27 55  76 45  24 96  45   5/19 BH 2min vis   " 34  38 27  21 61  75 39  25 39  38   5/26 BH 1min vis warm up  "BH 2min vis+guide  "ABC/123  " categories  41  64  87  67   25  12  8  15 71  79  67  71 29  21  33  29 46  66  100  75   6/3 BH 2 min vis/guide bolster/scan pics  " blurt cards foam mat  " r/l toe alt abs cat  " 3#'s bwd foam mat  " r/L alt foot  "R/L foot alt w/ abs cat 55    54  63   54  145  57   18    12  14  14  -  18 53    82  44  59  -  52 47    18  56  41  -  48 69    50  82  65  -  80                8   Pt will utilize atleast 2 word finding strategies in conversation in and out of session ketan  4/19: Word finding strategies provided  4/26:  "Hit or miss" pt reports drafting emails and not sending them  WF in session x2 SC  5/4:  No WF in session today  5/5 WF x1 with picture naming ( fire extinguisher)  5/17:  No WF noted  5/19 No WF noted  9   Pt will utilize atleast 2 memory strategies in structured 3 word, # or picture immediate memory recall tasks with carryover at home  4/19:  Memory strategies provided  4/21 verbalization strategy taught in session and demonstrated effectiveness! 4/26:  Drafting emails and not sending them (attention)  5/4:  Pt reports creating a grocery store list and navigating the store by category until patient misplaced her list  5/5 4 pics recall 100% trials ketan ( verbalization/rehearsal x1-2)  6/1:  Patient reporting that she has begun misplacing items and is unable to locate where they are  Long Term Goals:  1  Pt will increase cognitive linguistic skills across all settings  2  Pt will increase physical and mental endurance across all settings  Assessment:  Toe tapping unsuccessful with metronome   Pt performed significantly better with more cognitively demanding tasks with divided attn, took mind off of self doubt and frustration  We will start to incorporate this at home during work as discussed with patient today  ( add some distraction I e  music, chewing gum)       Recommendations:  -Patient would benefit from outpatient skilled Speech Therapy services : Cognitive-Linguistic therapy   Recommend neurology consult as well as optometry    -Frequency: 2x weekly  -Duration: 4-6 weeks    -Intervention certification from: 5/51/0312  -Intervention certification to: 8/19/52    -Intervention comments: puzzles, interactive metronome, memory/word finding strategies    Visit: 15

## 2021-06-03 NOTE — PROGRESS NOTES
Daily Note     Today's date: 6/3/2021  Patient name: Brianna Zamorano  : 1980  MRN: [id number]  Referring provider: GABRIELA Yanes  Dx:   Encounter Diagnosis     ICD-10-CM    1  COVID-19 virus detected  U07 1    2  Weakness of both lower extremities  R29 841                   Subjective: Reports she is doing okay, still gets distracted at work sometimes  Objective: See treatment diary below      Assessment: Tolerated treatment well  Patient demonstrated fatigue post treatment and would benefit from continued PT  Patient requires close supervision for Bosu challenge, progressing steadily otherwise  Plan: Continue per plan of care        Precautions: falls, monitor vitals  Re-eval Date: 2021     Date 5/13 5/18 5/20 5/27 6/3   Visit Count 11 12 13 14 15   FOTO completed       Pain In Left flank   0 0   Pain Out same   0 0       Manuals                                        Neuro Re-Ed        Star Drill 3 mins  Firm  Trial as HEP in doorway/threshold       Natus SOT  Adaptation  25 mins       Core stability  Sit  Peanut  MTP with AH 2x10  9#  LTP with AH  2x10  9#  Biceps  2x10  9#  Triceps  2x10  9#   Obstacle course   9 mins   for 8" and 10" step ups with and without load  Red foam with obstacles/uneven, yellow pads      Multidirectional board  ** NV      VOR Near  Stand  Foam  Busy  Hip width  85 bpm  R/L  85 bpm  Up/down  60" ea with cl S Stand  Foam  Busy  Hip width  105 bpm  R/L  105 bpm  Up/down  60" ea with S Stand  Foam  Busy - Disco ball  Rhom  105 bpm  R/L  105 bpm  Up/down  110 bpm  30" ea with S    VOR Far  Stand  Foam  Busy  Hip width  95 bpm  R/L  100 bpm  Up/down  60" ea with cl S Stand  Foam  Busy  Hip width  100 bpm  R/L  100 bpm  Up/down  60" ea with cl S Stand  Foam  Busy - Disco ball  Rhom   100 bpm  R/L  115 bpm  Up/down  120 bpm  30" ea with cl S    Imaginary Target  Stand  Foam  Busy  60 sec R  60 sec L  Hip Width Stand  Foam  Busy  60 sec R  60 sec L  Hip Width Eyes-Head  Stand  Foam  Busy  60 sec   Hip Width Stand  Foam  Busy  60 sec   Hip Width         Bosu  Disco ball  1 min x 3    Co-treat with ST     40 mins for divided attention  Bosu, inverted Bosu, red foam mat, alt UE/LE, alt tapping with cog challenge per speech, balance per PT   Ther Ex HR 75-98s   SpO2  97-99%  HR 80s, 98% on RA HR   SpO2 98-99% on RA     Aerobic TM  0%/1 2  8 min x 1  RPE 1-2    3 mins at 0%  1 2-1 5 rpms  RPE 2-3    3 mins at 2%  1 5  RPE: 2-3    3 mins at 5%  RPE: 3-4    3 mins at 5%  1 2   RPE: 2-3    5 mins cool down 5-0%  1 2 to 0 5  RPE: 1-2   TM  5% - 3 mins  1 4 mph    4% - 3 mins  1 7 mph    3% - 3 mins  2 1 mph    2% - 3 mins  2 2 mph    1% - 3 mins  2 4 mph    0% - 3 mins  2 5 mph    Cool down - 2 mins     TM  5% - 3 mins  1 4 mph    4% - 3 mins  1 7 mph    3% - 3 mins  2 1 mph    2% - 3 mins  2 2 mph    1% - 3 mins  2 4 mph    0% - 3 mins  2 4 mph    Cool down - 2 mins TM  5% - 3 mins  1 4 mph    4% - 3 mins  1 7 mph    3% - 3 mins  2 1 mph    2% - 3 mins  2 2 mph    1% - 3 mins  2 4 mph    0% - 3 mins  2 5 mph    Cool down - 2 mins    HR   SpO2 98% on RA throughout TM  5% - 3 mins  1 4 mph    4% - 3 mins  1 7 mph    3% - 3 mins  2 1 mph    2% - 3 mins  2 2 mph    1% - 3 mins  2 4 mph    0% - 3 mins  2 5 mph    Cool down - 2 mins    HR 90s throughout, SpO2 98% throughout   SLR x 4        HR/TR        Mini Squats        Knee flex/ext mach  22#  2x10  RPE 2-3    22#  2x10  RPE 2-3 22#  2x10  RPE 2-3  Extn  33#  2x10  RPE 2-3 22#  2x10  RPE 2-3  Extn  33#  2x10  RPE 2-3    Leg Press  2x10  75x  RPE 2 2x10  75#  RPE 2 unavailable    Prayer stretch        LTR        Ther Activity                        Gait Training                        Modalities

## 2021-06-03 NOTE — ASSESSMENT & PLAN NOTE
· Patient has been following with PT OT neurology speech  · Slowly improving   · Neurology in June, 8 Dr Elizabet Rutledge  · Continues with decreased concentration    Speech:     Pt will complete mental manipulation tasks of up to 4 words and 4 numbers @ 80% accuracy min-mod cue   4/19:  FWD @ 100% acc i'ly, BWD @ 100%, ABC @ 100%  5/5: rescramble 5 words into sentences 100% ketan, 4 words ranking 1/3 trials ketan ( verbalization) -- discontinued task following third trial 5/17: 4 words BWD @ 88% acc ketan  5/19: 4 words ranked over 4/5 trial, difficulty with grandfather set (symptoms- head pressure/eye twitch)  6/3 3 numbers bwd with metronome @ 90% ketan       2   Patient will complete deductive reasoning problems with increased complexity and decreased prompt from min cue to independently @ 100% acc over 4 weeks  Goal advanced below, see goal #4      3  Pt will answer "wh" questions in response to paragraphs read to self and read outloud by clinician @ 80% ketan  5/17: Wh- questions, paragraph read outloud by clinician @ 88% acc ketan, increasing to 100% w/ repetition  5/19 Paragraph read out loud by clinician, answered questions @ 80% acc ketan, both eyes twitching after "[facility]" questions      4  Pt will complete high level multi-tier deductive reasoning puzzles with no more than 2 errors per puzzle  4/28 completed puzzle number 5, 5x4 with 11 clues completed @ 100% min cue   5/10:   Calendar, Frank's closet, dog breed, couples, house, puzzles 1-6 from [facility] all @ 100% acc i'ly  5/12 completed puzzle #4 [facility] 5 min 100% ketan  Advanced up to digging logic #2 gracia flowers completed together but very minimal cue needed  She was able to carryover learned information to complete digging logic puzzle #5 "picky pests" @ with min cue 1x completed 10 min  5/17:  Digging Logic puzzle 3 completed @ 100% acc with min-mod cue, puzzle 4 completed @ 50% acc increased to 100% acc when instructed to re-attempt   5/26 puzzle #7 digging logic completed @ 100% ketan in 4 min  6/1:  Rush Hour Levels 2, 6, 11 completed @ 100% acc i'ly      5  Patient will complete rapid naming tasks with familliar pictures increasing processing speed throughout  4/26:  Named "50+" items in 5 minutes      6   Patient will name atleast 10 items in an abstract category independently in less than 2 minutes  4/19:  Presidents 16 (1 minute), kahlil stones 9, things that reflect light 6 4/26:  Bodies of water 13, furniture 11, occupations 20, trees 10 5/5 cold: 1:10 named 10 items with min cue  Hot: 12 items in 1 min  Yellow: 10 items in 1 min  (Teaching subcategories was helpful for her ) 5/17: Things that use batteries 10/10 ketan, team sports 10/10 ketan, things you wear on your feet 10/10 ketan, school subjects 10/10 ketan, and famous last names 10/10 w/ mod  cueing  Difficulty w/ famous last names due to broad nature  6/1:  Dog breeds 14, sources of light 8, items in a kitchen 18, types of breads 9 6/3 abstract categories with metronome naming 3-6 items on average       7   Patient will complete various sustained and divided attention tasks for 2+ minutes while attending to auditory/visual stimuli with task average decreasing closer to the average range  4/21 blink cards sorting color/shape 3:57 no errors  ( utilized visual cue and self verbalization cueing to remind self of category- this helped keep her attention and reduce uncontrollable laughter)  Sort color/shape/number 2:38 no errors ( visual cue + verbalization cue)  4/28 during IM 2 min task, patient OOB and rest needed due to endurance between reps  Standing and switching hand positioning  PT:  Subjective: Reports she is doing okay, still gets distracted at work sometimes           Objective: See treatment diary below        Assessment: Tolerated treatment well  Patient demonstrated fatigue post treatment and would benefit from continued PT    Patient requires close supervision for Bosu challenge, progressing steadily otherwise         Plan: Continue per plan of care     Precautions: falls, monitor vitals  Re-eval Date: 6/2/2021

## 2021-06-03 NOTE — ASSESSMENT & PLAN NOTE
· Lifestyle modification, diet and exercise discussed  · Medications discussed  · Labs discussed  · BMI discussed  · Depression screening performed  · COVID-19 long hauler discussed

## 2021-06-07 ENCOUNTER — EVALUATION (OUTPATIENT)
Dept: OCCUPATIONAL THERAPY | Facility: CLINIC | Age: 41
End: 2021-06-07
Payer: COMMERCIAL

## 2021-06-07 ENCOUNTER — OFFICE VISIT (OUTPATIENT)
Dept: SPEECH THERAPY | Facility: CLINIC | Age: 41
End: 2021-06-07
Payer: COMMERCIAL

## 2021-06-07 DIAGNOSIS — U07.1 COVID-19 VIRUS DETECTED: Primary | ICD-10-CM

## 2021-06-07 DIAGNOSIS — R41.841 COGNITIVE COMMUNICATION DEFICIT: ICD-10-CM

## 2021-06-07 PROCEDURE — 92508 TX SP LANG VOICE COMM GROUP: CPT | Performed by: NURSE PRACTITIONER

## 2021-06-07 PROCEDURE — 97110 THERAPEUTIC EXERCISES: CPT

## 2021-06-07 PROCEDURE — 97168 OT RE-EVAL EST PLAN CARE: CPT

## 2021-06-07 PROCEDURE — 97530 THERAPEUTIC ACTIVITIES: CPT

## 2021-06-07 NOTE — PROGRESS NOTES
Daily Note     Today's date: 2021  Patient name: Nuha Workman  : 1980  MRN: [id number]  Referring provider: GABRIELA Peterson  Dx:   Encounter Diagnosis     ICD-10-CM    1  COVID-19 virus detected  U07 1                   Subjective: Yea, I think I have been doing better since you saw me last       Objective: See treatment diary below      Assessment: Tolerated treatment well  Patient exhibited good technique with therapeutic exercises and would benefit from continued OT Pt presents this date feeling good, however, pt states SpO2 numbers have been a little lower over the last couple of days  Pt participated in skilled OT this date focusing on UE strength/endurance, activity tolerance/endurance, working memory, functional cognition, automaticity, and GMC/GMS, for increased participation in ADL/IADL activities  Pt engaged in standing 1 minute timed automaticity task including card matching and deductive reasoning sheet focusing on automaticity, UE strength/endurance, standing tolerance, activity tolerance, problem solving, functional cognition, and deductive reasoning  Pt completed with 100% accuracy and no SOB noted, however upon sitting, therapist noting increased rate of breathing  SpO2 was 98% and HR 82 BPM  Pt stood for roughly 20 minutes  Pt engaged in seated Multi Matrix activity this date focusing on mental manipulation, functional cognition, UE endurance/tolerance, /VM skills, visual scanning, and working memory  Pt completed x 2 month sheets with 100% accuracy, in addition to mental math  Pt also used FMC/FMS and verbal direction following to set up matrix with appropriate set up  Plan: Continue per plan of care  Progress treatment as tolerated  Progress treament per protocol          Vitals 2021      Pre Treatment SpO2 - 98  HR - 87        During Treatment SpO2 - 98%  HR - 96       Post Treatment SpO2 - 97%  HR - 84                Neuro Re-Ed                                                                 Ther Ex 06/01/2021 6/7/2021      Thera-Band  Sh Ext       Triceps Ext       Retraction         Biceps Curl             Digi-Flex        UBE Machine  Standing L 60 10 min F - post 98% SpO2 and 88 HR      Nu Step        Thera-Ball  Forward Flex    Rotation    Diagonal    Step/Bounce        52 Card Exercise        Upright Bike  Executive Function Worksheet #5 100%                Ther Activity 06/01/2021 6/7/2021      Grooved Peg Board        Tension Pin Pom Pom        Purdue Peg Board        Brain Box w/ FM Activity        Laundry Management        Dynavision  A      A Endurance  B - 50% Green          A - Addition        Tension Pin on Pole        Large Peg Board        Black Peg Board        Multi-Matrix Super Saccades  Months/mental math      Standing kitchen recipe following        Organize The Hour        Divided Attention Activity Cancellation worksheet on table top w/ super set of word find in Teller               Modalities 06/01/2021 6/7/2021

## 2021-06-07 NOTE — PROGRESS NOTES
OT Re-Evaluation     Today's date: 2021  Patient name: Belinda Marin  : 1980  MRN: [id number]  Referring provider: GABRIELA North  Dx:   Encounter Diagnosis     ICD-10-CM    1  COVID-19 virus detected  U07 1        Start Time: 0800  Stop Time: 1456  Total time in clinic (min): 55 minutes  Start Time: 0800  Stop Time: 8405  Total time in clinic (min): 55 minutes    Assessment  Assessment details: Patient presenting to OP OT services with a dx of post COVID-19  Patient reports initially begin dx with COVID-19 on 2020  Patient experienced fevers and coughing for 11 days  Patient was not hospitalized  Patient reports entire family experienced COVID symptoms  Patient reports coughing began to improve, but continued to exerpience endurance deficits  Patient reports difficulty with navigating stairs due to fatigue  Patient reports difficulty with daily ADLs and morning routines secondary to fatigue as well  Patient reports exhaustion varies based on intensity of exercises  Patient reports in general she begins to experience fatigue after thirty minutes of light activity  Patient is currently working as a  for a pharmacy  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 in which she was reporting an increased in fatigue during work duties  Patient also noted "brain fog" when completing job tasks on a computer  Patient had chest x-ray two days ago and was told she had a "sluggish" lung  Patient recent saw Cardiology and had a Holter monitor placed two weeks ago for 2 days  Patient has an EKG at the beginning of march  Patient had recent blood work completed without any abnormalities  Patient has an Echo scheduled for 2021  Patient will follow-up with Cardiology after Echo  Patient has follow-up in  PCP  Patient is right hand dominant   Patient stated the symptoms she experienced due to COVID included chest pain with radiating sensations into left arm, fever, coughing, loss of smell and taste  Patient reports occasional instance where taste is different, such as coffee at this time  Patient reports coughing improved since the end of January  Pt is now being referred to for post Covid evaulation  As assessed, patient will benefit from PT and SLP evaluation  spO2 99% 41 HR  Patient presenting with WNL breathing patterns during evaluation  When patient attempted light cardio activity, breathing patterns increased with nasal flaring and labored breathing  Patient also noted lightheadedness during activity  O2 saturations did not change with change in position  spO2 decreased to 91% with activity  migraines ocular  Last two weeks ago  Re-assessment completed on 5/3/2021  Patient has follow-up appoint on 6/3/2021 with PCP  Patient has consistently attended OT, PT, Speech therapy since start of care  Patient reports feeling like she is 50% better with physical and cognitive demands as compared to initial evaluation  Patient reports having most difficulty with cognitive tasks  Physically, patient feels like her endurance has improved  She is now able to do laundry at home and go grocery shopping without relying on a cart  Patient noted she still needs to take breaks when completing activities of daily living but feels like she has made progress  She continues to focus on her breathing during activity and has maintained a consistent spO2 and HR throughout sessions  Patient is still experiencing tingling in hands and fingers following activity, but reports chest pain is minimal to none  SLUMS  1/1  1/1  1/1  1/3  3/3  5/5  2/2  4/4  2/2  8/8  28/30 Normal    Please refer to PT assessment for TUG, TUG cog and 5x sit to stand  Re-assessment completed on 06/07/2021  Patient has consistently attended OP OT services since start of care  Patient has made steady progress towards established goals   Patient demonstrating with increases in activity tolerance, activity participation, increased strength and decreases in fatigue  Patient has not met all established goals and will benefit from continued OT services to maximize level of function  Patient has a follow-up with Neurology tomorrow and had a follow-up with PCP last week  Patient was prescribed adderall to address attention deficits  Patient reports a noticeable difference since beginning prescription  Patient has a follow-up with Pulmonology on 06/23/2021  Patient reports she feels 75% better as compared to pre-covid  Patient also notes her endurance has improved to one hour without experiencing fatigue  Patient has not met all established goals and will benefit from continued OT services  Patient continues to present with difficulty with divided attention, shoulder strength and hand tremors  Patient and therapist discussed continuation of OT services for four additional weeks x 1 visit a week  Impairments: abnormal coordination, abnormal or restricted ROM and impaired physical strength  Other impairment: decreased functional use  Barriers to therapy: Past Medical History:  01/2021: COVID-19  No date: Disease of thyroid gland  No date: Hypertension  Understanding of Dx/Px/POC: good   Prognosis: good    Goals  STGs    Pt will increase  strength by 5-10#  - Progressing    Pt will increase shoulder strength by 1/2 strength grade - Progressing    Pt will demonstrate improved activity tolerance to 10 minutes without rest breaks or fatigue - Met    Pt will demonstrate consistent spO2 during session of 90% and above - Met    Independent with HEP - Met      LTGs     Pt will increase  strength by an additional 5-10#   - Progressing    Pt will increase shoulder strength by 1-2 strength grade  - Progressing    Pt will demonstrate improved activity tolerance to 20 minutes without rest breaks or fatigue - Met    Pt will report an increase in ADL/IADL participation - Met          Plan  Plan details: Patient presenting to OP OT services with a dx of COVID-19  Patient still presenting with decreased strength, hand tremors and endurance during activity  Patient will benefit from continued OT services to work towards established goals  Sessions will focus on improving activity tolerance and strength to increase participation in ADLs/IADLs  Recommended OT services 1x/week for 4 weeks  Patient would benefit from: OT eval and skilled occupational therapy  Referral necessary: Yes  Planned therapy interventions: massage, manual therapy, joint mobilization, patient education, strengthening, stretching, fine motor coordination training, home exercise program, neuromuscular re-education, self care, therapeutic exercise, therapeutic activities, IADL retraining, ADL retraining, breathing training, coordination, graded activity and graded exercise  Frequency: 1x week  Duration in visits: 4  Duration in weeks: 4  Treatment plan discussed with: patient        Subjective Evaluation    History of Present Illness  Mechanism of injury: COVID-19          Not a recurrent problem   Quality of life: good    Pain  Current pain ratin  At best pain ratin  At worst pain ratin  Location: Chest Pain  Quality: radiating and sharp  Relieving factors: rest  Aggravating factors: overhead activity, walking, stair climbing and lifting    Social Support  Steps to enter house: yes  Stairs in house: yes (3 JUHI with HR)   3  Lives in: multiple-level home  Lives with: spouse, parents and significant other    Employment status: working  Hand dominance: right  Exercise comments: Oct [employer]        Diagnostic Tests  X-ray: abnormal  Treatments  Current treatment: occupational therapy  Patient Goals  Patient goals for therapy: decreased pain, return to sport/leisure activities, independence with ADLs/IADLs, increased strength and increased motion  Patient goal: Increase endurance        Objective     Neurological Testing     Sensation     Shoulder   Left Shoulder   Diminished: light touch    Right Shoulder   Intact: light touch    Wrist/Hand   Left   Diminished: light touch    Right   Intact: light touch    Additional Neurological Details  Patient reports radiating sensation deficits into left hand  Patient reports increased with activity  Patient reports still having tingling sensation in hands at times      Active Range of Motion   Left Shoulder   Normal active range of motion    Right Shoulder   Normal active range of motion    Left Elbow   Normal active range of motion    Right Elbow   Normal active range of motion    Left Wrist   Normal active range of motion    Right Wrist   Normal active range of motion    Left Thumb   Opposition: WNL    Right Thumb   Opposition: WNL    Additional Active Range of Motion Details  Composite fist    Strength/Myotome Testing     Left Shoulder     Planes of Motion   Flexion: 4-   Extension: 4   Abduction: 4-   Adduction: 4     Right Shoulder     Planes of Motion   Flexion: 4-   Extension: 4   Abduction: 4-   Adduction: 4     Left Elbow   Flexion: 4  Extension: 4  Forearm supination: 4  Forearm pronation: 4    Right Elbow   Flexion: 4  Extension: 4  Forearm supination: 4  Forearm pronation: 4    Left Wrist/Hand   Wrist extension: 4  Wrist flexion: 4  Radial deviation: 4  Ulnar deviation: 4     (2nd hand position)     Trial 1: 50    Pinch    Trial 1: 14    Right Wrist/Hand   Wrist extension: 4-  Wrist flexion: 4-  Radial deviation: 4-  Ulnar deviation: 4-     (2nd hand position)     Trial 1: 55    Pinch  Trial 1: 14    Additional Strength Details  Radiating pain with MMT testing  Neuro Exam:     Coordination   Heel to shin: left WNL and right WNL  Finger to nose: left WNL and right WNL  Rapid alternating movements: UE WNL and LE impaired     Functional outcomes   Left 9 peg hole test: 22 79 (seconds) at Re-evaluation on 06/07/2021 22 42  Right 9 hole peg test: 22 10 (seconds) at Re-evaluation on 06/07/2021 22 65  Functional outcome comment: Decreased [address] as compared to right hand

## 2021-06-08 ENCOUNTER — CONSULT (OUTPATIENT)
Dept: NEUROLOGY | Facility: CLINIC | Age: 41
End: 2021-06-08
Payer: COMMERCIAL

## 2021-06-08 VITALS
WEIGHT: 204 LBS | DIASTOLIC BLOOD PRESSURE: 82 MMHG | BODY MASS INDEX: 36.14 KG/M2 | HEART RATE: 71 BPM | SYSTOLIC BLOOD PRESSURE: 128 MMHG

## 2021-06-08 DIAGNOSIS — G43.109 OCULAR MIGRAINE: ICD-10-CM

## 2021-06-08 DIAGNOSIS — U09.9 COVID-19 LONG HAULER: ICD-10-CM

## 2021-06-08 DIAGNOSIS — R41.3 MEMORY LOSS: ICD-10-CM

## 2021-06-08 DIAGNOSIS — R29.90 PERSISTENT NEUROLOGIC SYMPTOMS AFTER COVID-19: Primary | ICD-10-CM

## 2021-06-08 DIAGNOSIS — R41.840 ATTENTION AND CONCENTRATION DEFICIT: ICD-10-CM

## 2021-06-08 DIAGNOSIS — U09.9 PERSISTENT NEUROLOGIC SYMPTOMS AFTER COVID-19: Primary | ICD-10-CM

## 2021-06-08 PROCEDURE — 3079F DIAST BP 80-89 MM HG: CPT | Performed by: PSYCHIATRY & NEUROLOGY

## 2021-06-08 PROCEDURE — 99244 OFF/OP CNSLTJ NEW/EST MOD 40: CPT | Performed by: PSYCHIATRY & NEUROLOGY

## 2021-06-08 PROCEDURE — 3074F SYST BP LT 130 MM HG: CPT | Performed by: PSYCHIATRY & NEUROLOGY

## 2021-06-08 PROCEDURE — 1036F TOBACCO NON-USER: CPT | Performed by: PSYCHIATRY & NEUROLOGY

## 2021-06-08 NOTE — PROGRESS NOTES
Patient ID: Hayden Concepcion is a 38 y o  female  Assessment/Plan:    Persistent neurologic symptoms after COVID-19  In summary, Hayden Concepcion is a 38 y o   female with a history of HTN on metoprolol, occular migraines, Hypothyroidism, and presumptive Covid-19 infection in 12/2020 who presented for neurological consultation  Initially at the time of and since her presumptive Covid-19 infection the patient has developed new neurological symptoms including cognitive difficulties, a recrudescence of her previously occular migraines that have been in remission for >10 years, development of new migraines and abnormal movements  The patient has been seeing speech therapy and OT  On examination the patient is noted to be hyperreflexic throughout her upper and lower extremities, but negative for clonus  The patient also exhibits an action tremors that in her upper extremity is positional and is induced upon flexion of fingers  The patient also exhibits an abnormal movement in her lower extremity that is induced upon crossing her leg which results in the little toe moving back and forth in abduction and adduction  No workup thus far has been completed outside of the evaluations by speech and OT  While the cause of these symptoms are almost certainly due to her Covid-19 infection, a more specific diagnosis is unable to be determined at this time the patient will require an extensive work up to evaluated the underlying pathological causes for her symptoms prior to initiating any medical treatment(s)  Plan  - MRI brain w/ and w/out contrast to identify and possible structural or inflammatory changes  - EEG to rule out any ?  Seizure activity  - Laboratory studies  - Neuropsych evaluation for more formal diagnosis and pinpointing of specific cognitive issues  - Pt is to follow up       COVID-19 long hauler  - See above for AP    Ocular migraine  - See above for AP    Attention and concentration deficit  - See above for AP       Diagnoses and all orders for this visit:    Persistent neurologic symptoms after COVID-19  -     Ambulatory referral to Neuropsychology; Future  -     BUN; Future  -     Creatinine, serum; Future  -     MRI brain with and without contrast; Future  -     EEG Routine and awake; Future    COVID-19 long hauler  -     Ambulatory referral to Neurology    Ocular migraine  -     BUN; Future  -     Creatinine, serum; Future  -     MRI brain with and without contrast; Future    Attention and concentration deficit  -     Ambulatory referral to Neuropsychology; Future  -     MRI brain with and without contrast; Future  -     EEG Routine and awake; Future  -     Vitamin B12; Future  -     Methylmalonic acid, serum; Future  -     Lyme Antibody Profile with reflex to WB; Future  -     Folate; Future  -     Vitamin B6; Future           Subjective:    Bethany Dewey is a 38 y o  female with PMHx of HTN on metoprolol,  occular migraines, Hypothyroidism on levothyroxine, S/p hysterectomy and oophorectomy > 5 years ago and Covid in 12/2020  Of note the patient was not formally diagnosed via testing but has a presumptive infection given that her  tested positive and her symptomatology is consistent with diagnosis  The patient was not hospitalized, no O2, was not on steroids, antibiotics, or remdesivir, was only only on Tylenol for fever  TMax was believed to be around 103  Pt had a cough and took tusin and coricidin  The patient and her  present today for consult for multiple neurological complaints  The patient is reporting memory and cognitive issues  Specifically she initially had trouble with remembering words, dogs name, this has improved though with speech therapy, she is still having issues remembering where she placed things  Pt has recall issues with remembering during "memory" games (ie  The game where you flip over two cards over and try to remember where they pair are)  Short term memory issues  Issues with remembering why she would go somewhere and what she was doing  Symptoms first started/where noticed during initial infection  The patient is also reporting headaches  The pt reports that years ago she had occular migraines, and is treated with her metoprolol  These had been completely resolved for 10 years before they recrudesced with her covid infection  The patient reports that the occular migraines are associated with seeing a rainbow and then a loss of vision  The patient is also reporting now having other migraines, and had no prior history of these except for at the end of an occular previously but not often  The patient reports that she can now get them alone and describes them as stabbing pain, pressure, vise  like, pt reports having someone squeeze her head helps relieve the symptoms  Nausea, photophobia, and phonophobia, associated with it  Migraines are triggered by speech therapy but also would get them at other times that sound to be associated with siginificant cognitive functioning (going over a calender for example), water dark room and tylelenol and neproxin help  Symptoms first started during initial infection    Pt reports that she also has developed a tremor that first started/was noticed initially about 2-3 months after her initial infection  Pt reports the tremor is located in her toes and fingers  It will be triggered by holding on to an object, it is only present with action  She does report hand weakness as well  Has had OT for this  Does report hand swelling intermittently, but this is triggered by any activity, not just with the hand, even is triggered by walking  The patient also reports that balance and coordination are an issue  Pt also reports she is not driving right now  ROS reviewed and as per documentation below  No change in bowel or bladder  No LOC or seizure  No change in speech or swallowing  No change in vision  No loss of vision  No diplopia   No nausea or vomiting  No recent hospitalizations  No fever or chills  No cough or sob  The following portions of the patient's history were reviewed and updated as appropriate: allergies, current medications, past family history, past medical history, past social history, past surgical history and problem list      Objective:    Blood pressure 128/82, pulse 71, weight 92 5 kg (204 lb)  Physical Exam  Vitals signs and nursing note reviewed  Constitutional:       General: She is not in acute distress  Appearance: She is well-developed  She is not diaphoretic  HENT:      Head: Normocephalic and atraumatic  Nose: Nose normal    Eyes:      General: Lids are normal          Right eye: No discharge  Left eye: No discharge  Extraocular Movements: Extraocular movements intact  Conjunctiva/sclera: Conjunctivae normal       Pupils: Pupils are equal, round, and reactive to light  Neck:      Musculoskeletal: Normal range of motion  Cardiovascular:      Rate and Rhythm: Normal rate and regular rhythm  Heart sounds: No murmur  No friction rub  No gallop  Pulmonary:      Effort: Pulmonary effort is normal  No respiratory distress  Breath sounds: Normal breath sounds  Abdominal:      General: Abdomen is flat  Palpations: Abdomen is soft  Musculoskeletal: Normal range of motion  Right lower leg: No edema  Left lower leg: No edema  Skin:     General: Skin is warm and dry  Neurological:      General: No focal deficit present  Mental Status: She is alert and oriented to person, place, and time  Deep Tendon Reflexes:      Reflex Scores:       Tricep reflexes are 3+ on the right side and 3+ on the left side  Bicep reflexes are 3+ on the right side and 3+ on the left side  Brachioradialis reflexes are 3+ on the right side and 3+ on the left side  Patellar reflexes are 3+ on the right side and 3+ on the left side         Achilles reflexes are 3+ on the right side and 3+ on the left side  Psychiatric:         Speech: Speech normal          Behavior: Behavior normal          Thought Content: Thought content normal          Judgment: Judgment normal          Neurological Exam  Mental Status  Alert  Recalls 3 of 3 objects immediately  At 5 minutes recalls 2 of 3 objects  Speech is normal  Language is fluent with no aphasia  Able to name objects  Attention and concentration are normal     Cranial Nerves  CN II: Visual acuity is normal  Visual fields full to confrontation  CN III, IV, VI: Extraocular movements intact bilaterally  Normal lids and orbits bilaterally  Pupils equal round and reactive to light bilaterally  CN V: Facial sensation is normal   CN VII: Full and symmetric facial movement  CN VIII: Hearing is normal   CN IX, X: Palate elevates symmetrically  Normal gag reflex  CN XI: Shoulder shrug strength is normal   CN XII: Tongue midline without atrophy or fasciculations  Motor  Normal muscle bulk throughout  No fasciculations present  Normal muscle tone  The following abnormal movements were seen: Pt has action tremors that are induced with specific movements  Upper extremity is induced with flexion of fingers and results in a tremor in her fingers  Lower extremity is induced with crossing her leg and the little toe moves back and forth in abduction and adduction    Strength is 5/5 in all four extremities except as noted  Sensory  Light touch is normal in upper and lower extremities  Temperature is normal in upper and lower extremities  Reflexes  Deep tendon reflexes are 2+ and symmetric except as noted                                             Right                      Left  Brachioradialis                    3+                         3+  Biceps                                 3+                         3+  Triceps                                3+                         3+  Patellar                                3+ 3+  Achilles                                3+                         3+  Plantar                           Mute                Mute    Right pathological reflexes: Ankle clonus absent  Left pathological reflexes: Ankle clonus absent  Coordination  Right: Finger-to-nose normal  Heel-to-shin normal   Left: Finger-to-nose normal  Heel-to-shin normal     Gait  Casual gait is normal including stance, stride, and arm swing  ROS:    Review of Systems   Constitutional: Negative  Negative for appetite change and fever  HENT: Negative  Negative for hearing loss, tinnitus, trouble swallowing and voice change  Eyes: Negative  Negative for photophobia and pain  Eye lid twitching with therapy   Respiratory: Negative  Negative for shortness of breath  Cardiovascular: Negative  Negative for palpitations  Gastrointestinal: Negative  Negative for nausea and vomiting  Endocrine: Negative  Negative for cold intolerance  Genitourinary: Negative  Negative for dysuria, frequency and urgency  Musculoskeletal: Negative  Negative for myalgias and neck pain  L thigh twitching with therapy   Skin: Negative  Negative for rash  Neurological: Positive for tremors (fingers, toes, and hands in a fist) and headaches (when stressed starting from post scalp)  Negative for dizziness, seizures, syncope, facial asymmetry, speech difficulty, weakness, light-headedness and numbness  Hematological: Negative  Does not bruise/bleed easily  Psychiatric/Behavioral: Negative  Negative for confusion, hallucinations and sleep disturbance          Memory problems - short term

## 2021-06-08 NOTE — ASSESSMENT & PLAN NOTE
In summary, Akosua Fermin is a 38 y o   female with a history of HTN on metoprolol, occular migraines, Hypothyroidism, and presumptive Covid-19 infection in 12/2020 who presented for neurological consultation  Initially at the time of and since her presumptive Covid-19 infection the patient has developed new neurological symptoms including cognitive difficulties, a recrudescence of her previously occular migraines that have been in remission for >10 years, development of new migraines and abnormal movements  The patient has been seeing speech therapy and OT  On examination the patient is noted to be hyperreflexic throughout her upper and lower extremities, but negative for clonus  The patient also exhibits an action tremors that in her upper extremity is positional and is induced upon flexion of fingers  The patient also exhibits an abnormal movement in her lower extremity that is induced upon crossing her leg which results in the little toe moving back and forth in abduction and adduction  No workup thus far has been completed outside of the evaluations by speech and OT  While the cause of these symptoms are almost certainly due to her Covid-19 infection, a more specific diagnosis is unable to be determined at this time the patient will require an extensive work up to evaluated the underlying pathological causes for her symptoms prior to initiating any medical treatment(s)  Plan  - MRI brain w/ and w/out contrast to identify and possible structural or inflammatory changes  - EEG to rule out any ?  Seizure activity  - Laboratory studies  - Neuropsych evaluation for more formal diagnosis and pinpointing of specific cognitive issues  - Pt is to follow up

## 2021-06-10 ENCOUNTER — APPOINTMENT (OUTPATIENT)
Dept: LAB | Facility: CLINIC | Age: 41
End: 2021-06-10
Payer: COMMERCIAL

## 2021-06-10 ENCOUNTER — OFFICE VISIT (OUTPATIENT)
Dept: SPEECH THERAPY | Facility: CLINIC | Age: 41
End: 2021-06-10
Payer: COMMERCIAL

## 2021-06-10 ENCOUNTER — OFFICE VISIT (OUTPATIENT)
Dept: PHYSICAL THERAPY | Facility: CLINIC | Age: 41
End: 2021-06-10
Payer: COMMERCIAL

## 2021-06-10 DIAGNOSIS — Z74.09 DECREASED FUNCTIONAL MOBILITY AND ENDURANCE: ICD-10-CM

## 2021-06-10 DIAGNOSIS — R29.90 PERSISTENT NEUROLOGIC SYMPTOMS AFTER COVID-19: ICD-10-CM

## 2021-06-10 DIAGNOSIS — U09.9 PERSISTENT NEUROLOGIC SYMPTOMS AFTER COVID-19: ICD-10-CM

## 2021-06-10 DIAGNOSIS — R41.840 ATTENTION AND CONCENTRATION DEFICIT: ICD-10-CM

## 2021-06-10 DIAGNOSIS — R41.841 COGNITIVE COMMUNICATION DEFICIT: ICD-10-CM

## 2021-06-10 DIAGNOSIS — R29.898 WEAKNESS OF BOTH LOWER EXTREMITIES: ICD-10-CM

## 2021-06-10 DIAGNOSIS — U07.1 COVID-19 VIRUS DETECTED: Primary | ICD-10-CM

## 2021-06-10 DIAGNOSIS — G43.109 OCULAR MIGRAINE: ICD-10-CM

## 2021-06-10 LAB
BUN SERPL-MCNC: 13 MG/DL (ref 5–25)
CREAT SERPL-MCNC: 0.72 MG/DL (ref 0.6–1.3)
FOLATE SERPL-MCNC: >20 NG/ML (ref 3.1–17.5)
GFR SERPL CREATININE-BSD FRML MDRD: 104 ML/MIN/1.73SQ M
VIT B12 SERPL-MCNC: 443 PG/ML (ref 100–900)

## 2021-06-10 PROCEDURE — 36415 COLL VENOUS BLD VENIPUNCTURE: CPT

## 2021-06-10 PROCEDURE — 84520 ASSAY OF UREA NITROGEN: CPT

## 2021-06-10 PROCEDURE — 84207 ASSAY OF VITAMIN B-6: CPT

## 2021-06-10 PROCEDURE — 83918 ORGANIC ACIDS TOTAL QUANT: CPT

## 2021-06-10 PROCEDURE — 82746 ASSAY OF FOLIC ACID SERUM: CPT

## 2021-06-10 PROCEDURE — 82607 VITAMIN B-12: CPT

## 2021-06-10 PROCEDURE — 92507 TX SP LANG VOICE COMM INDIV: CPT

## 2021-06-10 PROCEDURE — 86235 NUCLEAR ANTIGEN ANTIBODY: CPT

## 2021-06-10 PROCEDURE — 97110 THERAPEUTIC EXERCISES: CPT | Performed by: PHYSICAL THERAPIST

## 2021-06-10 PROCEDURE — 86618 LYME DISEASE ANTIBODY: CPT

## 2021-06-10 PROCEDURE — 82565 ASSAY OF CREATININE: CPT

## 2021-06-10 NOTE — PROGRESS NOTES
PT Discharge    Today's date: 6/10/2021  Patient name: Radha Vinson  : 1980  MRN: [id number]  Referring provider: GABRIELA Venegas  Dx:   Encounter Diagnosis     ICD-10-CM    1  COVID-19 virus detected  U07 1    2  Weakness of both lower extremities  R29 [id number]    3  Decreased functional mobility and endurance  Z74 09                   Assessment  Assessment details: Goals met as listed, progressed well  Ready to transition to wellness/gym/HEP  Understanding of Dx/Px/POC: good   Prognosis: good  Prognosis details: Positive prognostic indicators include positive attitude toward recovery and good understanding of diagnosis and treatment plan options  Negative prognostic indicators include chronicity of symptoms and co-morbidities  Goals  In 4 weeks, patient will:  1  Demonstrate ability to perform 30 minutes of activity without requiring seated rest break  - MET  2  Demonstrate ability to maintain upright balance unsupported by UEs while reaching above shoulder height without resistance to promote stability with ADLs - MET  3  Demonstrate ability to lift up to 15 lbs from  knees to waist unsupported by UEs to promote stability with ADLs - ONGOING  4  Demonstrate increased strength of bilateral LEs to allow for improved transfer quality and stability - ONGOING    In 4-10 weeks, patient will: - ONGOING  1  Demonstrate reduced fall risk based on outcome measures - MET  2  Demonstrate consistent carryover with HEP - MET  3  Return to community ambulation with least restrictive AD for at least 500+ feet - MET    New goals:  Demonstrate ability to perform 45 minutes of activity without seated rest break, without UE support    - MET      Plan  Duration in visits: 1  Duration in weeks: 1  Plan of Care beginning date: 6/10/2021  Plan of Care expiration date: 6/10/2021  Treatment plan discussed with: patient and PTA        Subjective Evaluation    History of Present Illness  Mechanism of injury: UPDATE:  Patient reports she is doing better  Not napping during the day at this point  Still fatigued by the end of the day, but this is normal fatigue  Per EMR:  Patient with diagnosed COVID-19 in January of 2021  Occasional low HR while exercising (HR was appropriate while walking on the treadmill - 's; however, dropped to 48 and patient had subsequent chest discomfort over the left pectoral muscle, radiating to left shoulder and down left arm to her hand, this made her tired and she took a 2 hour nap  Positive for shortness of breath, chest discomfort, intermittent dizziness, blurred vision/fogginess, achy joints, migraines, and red rash on her face intermittently  Of note:  Limited endurance, limited ambulation (1-2 minutes at times), decreasd driving tolerance (30 mins), attention to task (20 mins), dizziness/balance deficits  Sensation dizziness with low heart rate, clammy  Migraines  SOB and chest pain with negotiation of stairs  Burning with increased activity  Quality of life: fair    Pain  Location: Joint pain with activity - improving since SOC  Aggravating factors: lifting    Social Support    Employment status: working    Diagnostic Tests    FCE comments: CHEST      INDICATION:  U07 1: COVID-19      COMPARISON:  None     EXAM PERFORMED/VIEWS:  XR CHEST PA & LATERAL     FINDINGS:     Cardiomediastinal silhouette appears unremarkable      Tiny focus of linear atelectasis or scarring left costophrenic angle  The lungs are otherwise clear        No pneumothorax or pleural effusion      Osseous structures appear within normal limits for patient age      IMPRESSION:     No acute cardiopulmonary disease  Treatments  Current treatment: physical therapy, speech therapy and occupational therapy  Patient Goals  Patient goals for therapy: increased strength, independence with ADLs/IADLs, return to sport/leisure activities, return to work and decreased pain          Objective     Following with ST  Following with OT     Objective:      Functional Outcome Measures:    Balance : At eval:  5x Sit to stand:  18 3 sec  FGA  Score:  11/30     At re-eval:  5x Sit to stand:  10 0  FGA  Score:  29/30     Additional Screening Based on Yellow flag or if determined appropriate:   PHQ-9:  See flowsheet, medication in place    Dynamic Gait Index (DGI)/Functional Gait Assessment (FGA)  TOTAL SCORE: ____23_______/ maximum score of 30    Dynamic Gait Index (DGI)/Functional Gait Assessment (FGA)    1   Gait Level Surface    3) Normal-walks 20 feet in less than 5 5 seconds, no AD, good speed, no evidence for imbalance, normal gait pattern, deviates no more than 6 inches outside of the 12 inch wide walkway  2   Change in Gait Speed  3)  Normal-able to smoothly change walking speed without loss of balance or gait deviation  Shows a significant difference in walking speeds between normal, fast, and slow speeds  Deviates no ore than 6 inches outside of 12 inch wide walkway  3   Gait with horizontal head turns  3)  Normal-Performs head turns smoothly with no change in gait  Deviates no more than 6 inches outside 12 inch wide walkway  4   Gait with vertical head turns  3)  Normal-Performs head turns smoothly with no change in gait  Deviates no more than 6 inches outside 12 inch wide walkway  5   Gait and Pivot Turn  3)  Normal - pivot turns safely within 3 seconds and stops quickly with no loss of balance    6  Step over Obstacles  1)  Moderate impairment - is able to step over 4 5 inch obstacle, but must slow down and adjust steps to clear box safely  May require verbal cues  7   Gait with NBOS   3)  Normal - is able to ambulate for 10 steps heel to toe with no staggering    8  Gait with Eyes Closed  3)  Normal - walks 20 feet, no AD, good speed, no evidence of imbalance, normal gait pattern, deviates no more than 6 inches outside of the 12 inch wide walkway    Ambulates 20 feet in less than 7 seconds  9   Ambulating Backwards  3)  Normal - walk 20 feet, no AD, good speed, no evidence for imbalance, normal gait pattern, deviates no more than 6 inches outside of 12 inch wide walkway  10   Steps   3)  Normal - alternating feet, no rail  TOTAL SCORE: ____29_____/ maximum score of 30      TU seconds without AD  TUG - co seconds (backward by 3 from 47)    At re-eval:  7 8 sec; 8 9 sec    Breathing evaluation:     Describe breathing: via: room air  Breathing pattern: WNL  Capillary refill: 2 seconds  Clubbing: absent  Nasal flaring: absent         Rib cage expansion/Chest wall mobility    Supine normal breathe (tidal)/supine deep breathe(vital capacity) Seated normal breathe (tidal)/supine deep breathe (vital capacity) Standing normal breathe (tidal)/supine deep breathe (vital capacity)   Axilla Age appropriate Age appropriate Age appropriate   xiphoid Age appropriate Age appropriate Age appropriate   Half way between xiphoid and umbilicus Age appropriate Age appropriate Age appropriate         Is chest wall expansion symmetrical? Y

## 2021-06-10 NOTE — PROGRESS NOTES
Speech-Language Pathology Treatment Note    Today's date: 6/10/2021  Patient name: Sue De Dios  : 1980  MRN: [id number]  Referring provider: GABRIELA Martínez  Dx:   No diagnosis found  Medical History significant for:   No past medical history on file  Flowsheet:  Start Time: 0800  Stop Time: 0900  Total time in clinic (min): 60 minutes    Subjective:      Patient arrived on time to session today following PT  Graduated from PT today  Saw neurology last week for tests including MRI and EEG, pending results  Patient feels work is going well and has noticed improvements including getting a raise  Continuing to report improvements on Adderall regarding attention and the ability to hold conversations  Patient reported a headache after last SLP session  Objective  1  Pt will complete mental manipulation tasks of up to 4 words and 4 numbers @ 80% accuracy min-mod cue   :  FWD @ 100% acc i'ly, BWD @ 100%, ABC @ 100%  : rescramble 5 words into sentences 100% ketan, 4 words ranking 1/3 trials ketan ( verbalization) -- discontinued task following third trial : 4 words BWD @ 88% acc ketan  : 4 words ranked over 4/5 trial, difficulty with grandfather set (symptoms- head pressure/eye twitch)  6/3 3 numbers bwd with metronome @ 90% ketna   4 numbers BWD on metronome w/ 90% accuracy 6/10 4 word ranking 3/3 trials ketan    2  Patient will complete deductive reasoning problems with increased complexity and decreased prompt from min cue to independently @ 100% acc over 4 weeks  Goal advanced below, see goal #4  3   Pt will answer "wh" questions in response to paragraphs read to self and read outloud by clinician @ 80% ketan  : Wh- questions, paragraph read outloud by clinician @ 88% acc ketan, increasing to 100% w/ repetition   Paragraph read out loud by clinician, answered questions @ 80% acc ketan, both eyes twitching after "[address]" questions      4   Pt will complete high level multi-tier deductive reasoning puzzles with no more than 2 errors per puzzle  4/28 completed puzzle number 5, 5x4 with 11 clues completed @ 100% min cue   5/10:   Calendar, Frank's closet, dog breed, couples, house, puzzles 1-6 from [facility] all @ 100% acc i'ly  5/12 completed puzzle #4 [facility] 5 min 100% ketan  Advanced up to digging logic #2 gracia flowers completed together but very minimal cue needed  She was able to carryover learned information to complete digging logic puzzle #5 "picky pests" @ with min cue 1x completed 10 min  5/17:  Digging Logic puzzle 3 completed @ 100% acc with min-mod cue, puzzle 4 completed @ 50% acc increased to 100% acc when instructed to re-attempt  5/26 puzzle #7 digging logic completed @ 100% ketan in 4 min  6/1:  Rush Hour Levels 2, 6, 11 completed @ 100% acc i'ly  6/7 Rush Hour levels 4 and 5 while on metronome @ 100% ketan  6/10 Rush Hour 12 @ 2:41 ketan, level 14 @ 1:54 ketan  5   Patient will complete rapid naming tasks with familliar pictures increasing processing speed throughout  4/26:  Named "50+" items in 5 minutes  6   Patient will name atleast 10 items in an abstract category independently in less than 2 minutes  4/19:  Presidents 16 (1 minute), kahlil stones 9, things that reflect light 6 4/26:  Bodies of water 13, furniture 11, occupations 20, trees 10 5/5 cold: 1:10 named 10 items with min cue  Hot: 12 items in 1 min  Yellow: 10 items in 1 min  (Teaching subcategories was helpful for her ) 5/17: Things that use batteries 10/10 ketan, team sports 10/10 ketan, things you wear on your feet 10/10 ketan, school subjects 10/10 ketan, and famous last names 10/10 w/ mod  cueing  Difficulty w/ famous last names due to broad nature  6/1:  Dog breeds 14, sources of light 8, items in a kitchen 18, types of breads 9 6/3 abstract categories with metronome naming 3-6 items on average  6/7 during IM named ~4-10 items in each abstract category 6/10 Named 3-8 items per abstract category on IM     7  Patient will complete various sustained and divided attention tasks for 2+ minutes while attending to auditory/visual stimuli with task average decreasing closer to the average range  4/21 blink cards sorting color/shape 3:57 no errors  ( utilized visual cue and self verbalization cueing to remind self of category- this helped keep her attention and reduce uncontrollable laughter)  Sort color/shape/number 2:38 no errors ( visual cue + verbalization cue)  4/28 during IM 2 min task, patient OOB and rest needed due to endurance between reps  Standing and switching hand positioning  Date Task type Task Average  SRO Early  Late  Variance    4/21 Short form task 1  Task 2  2 min visual BH 38   98  45 12  4  17 38  98  83 62  2  17 45  68  41   4/28 BH 2 min vis  " 51  67 17  9 72  89 28  11 57  43   5/12  BH 2 min vis   BH 2 min vis  73  42 17  27 55  76 45  24 96  45   5/19 BH 2min vis   " 34  38 27  21 61  75 39  25 39  38   5/26 BH 1min vis warm up  "BH 2min vis+guide  "ABC/123  " categories  41  64  87  67   25  12  8  15 71  79  67  71 29  21  33  29 46  66  100  75   6/3 BH 2 min vis/guide bolster/scan pics  " blurt cards foam mat  " r/l toe alt abs cat  " 3#'s bwd foam mat  " r/L alt foot  "R/L foot alt w/ abs cat 55    54  63   54  145  57   18    12  14  14  -  18 53    82  44  59  -  52 47    18  56  41  -  48 69    50  82  65  -  80   6/7  R/L foot alt w/ abs cat   * 4 #'s bwd w/ foam mat alt toes  * seated wiggle seat, rush hour aud  " vis cue  61  66    311    118 23  10    -    6 77  90    -    76 23  10    -    24 77  46    -    135   6/10 R/L foot alt toe tap w/ abs cat 2 min  * 3 min  Auditory R hand  Auditory color/shape/# L toe  *no guide sounds   53    51  38  194   125   11    17  -  2  6 73    77  -  81  73 27    23  -  19  27 55    51  -  193  134         8  Pt will utilize atleast 2 word finding strategies in conversation in and out of session ketan  4/19: Word finding strategies provided  4/26:  "Hit or miss" pt reports drafting emails and not sending them  WF in session x2 SC  5/4:  No WF in session today  5/5 WF x1 with picture naming ( fire extinguisher)  5/17:  No WF noted  5/19 No WF noted  6/7 No WF noted 6/10 No WF noted    9  Pt will utilize atleast 2 memory strategies in structured 3 word, # or picture immediate memory recall tasks with carryover at home  4/19:  Memory strategies provided  4/21 verbalization strategy taught in session and demonstrated effectiveness! 4/26:  Drafting emails and not sending them (attention)  5/4:  Pt reports creating a grocery store list and navigating the store by category until patient misplaced her list  5/5 4 pics recall 100% trials ketan ( verbalization/rehearsal x1-2)  6/1:  Patient reporting that she has begun misplacing items and is unable to locate where they are  Long Term Goals:  1  Pt will increase cognitive linguistic skills across all settings  2  Pt will increase physical and mental endurance across all settings  Assessment:  Continued more cognitively demanding tasks with divided attn  Task average decreased with removal of guide sounds and visuals auditorily  Patient reported eye twitching at the end of session       Recommendations:  -Patient would benefit from outpatient skilled Speech Therapy services : Cognitive-Linguistic therapy   Recommend neurology consult as well as optometry    -Frequency: 2x weekly  -Duration: 4-6 weeks    -Intervention certification from: 7/58/6531  -Intervention certification to: 4/11/07    -Intervention comments: puzzles, interactive metronome, memory/word finding strategies    Visit: 17

## 2021-06-11 LAB
B BURGDOR IGG+IGM SER-ACNC: 51
ENA SS-A AB SER-ACNC: <0.2 AI (ref 0–0.9)
ENA SS-B AB SER-ACNC: <0.2 AI (ref 0–0.9)

## 2021-06-15 ENCOUNTER — APPOINTMENT (OUTPATIENT)
Dept: OCCUPATIONAL THERAPY | Facility: CLINIC | Age: 41
End: 2021-06-15
Payer: COMMERCIAL

## 2021-06-15 ENCOUNTER — APPOINTMENT (OUTPATIENT)
Dept: SPEECH THERAPY | Facility: CLINIC | Age: 41
End: 2021-06-15
Payer: COMMERCIAL

## 2021-06-15 LAB
METHYLMALONATE SERPL-SCNC: 225 NMOL/L (ref 0–378)
SL AMB DISCLAIMER: NORMAL

## 2021-06-17 ENCOUNTER — TELEPHONE (OUTPATIENT)
Dept: NEUROLOGY | Facility: CLINIC | Age: 41
End: 2021-06-17

## 2021-06-17 ENCOUNTER — APPOINTMENT (OUTPATIENT)
Dept: PHYSICAL THERAPY | Facility: CLINIC | Age: 41
End: 2021-06-17
Payer: COMMERCIAL

## 2021-06-17 ENCOUNTER — OFFICE VISIT (OUTPATIENT)
Dept: SPEECH THERAPY | Facility: CLINIC | Age: 41
End: 2021-06-17
Payer: COMMERCIAL

## 2021-06-17 DIAGNOSIS — R41.841 COGNITIVE COMMUNICATION DEFICIT: Primary | ICD-10-CM

## 2021-06-17 LAB — VIT B6 SERPL-MCNC: 14.9 UG/L (ref 2–32.8)

## 2021-06-17 PROCEDURE — 92507 TX SP LANG VOICE COMM INDIV: CPT

## 2021-06-17 NOTE — TELEPHONE ENCOUNTER
Neuropsychological Evaluation  [facility]  [address], [place]  P: [phone] F: [phone]    Intake Note  Date of Referral to Neuropsychology: 06/08/2021  Referring Provider: Dr Lorena Osullivan / Kassandra Tello    Called to conduct screening prior to scheduling neuropsychological evaluation  Spoke to patient directly to answer intake questions  Explained nature of neuropsychological evaluation  Patient is agreeable to evaluation  The following questions were answered  1 ) Does the patient have the ability to do a virtual intake? Yes    2 ) Does the patient have any problems that would limit her ability to participate in testing that requires interaction with another person, such as hearing or language impairments? No    3 ) Does the patient have any problems that would limit her ability to complete testing during one appointment that can last for more than 2 hours? (e g , severe fatigue, pain, or other debility) No    4 ) Does the patient have a preference between completing the evaluation in one session, or over the course of a few sessions? No    5 ) Does the patient have difficulties ambulating (e g , does she need a walker, cane, or wheelchair)? No    6 ) Would the patient be available for a last minute appointment if the opportunity arises? Yes, available    Referral will be reviewed by providers and we will call to schedule as appropriate  Referral is pending insurance review  Insurance company will be contacted to verify participation of providers and authorization/pre-certification for Neuropsychological evaluation  Patient has been made aware of this

## 2021-06-17 NOTE — PROGRESS NOTES
Speech-Language Pathology Treatment Note    Today's date: 2021  Patient name: Gerhard Pierre  : 1980  MRN: [id number]  Referring provider: GABRIELA Tian  Dx:   Encounter Diagnosis     ICD-10-CM    1  Cognitive communication deficit  R41 841      Medical History significant for:   No past medical history on file  Flowsheet:  Start Time: 0800  Stop Time: 0900  Total time in clinic (min): 60 minutes    Subjective:  Patient arrived on time to session today following PT  She reports coming off of a hectic few days including her daughter's prom and graduation, she is also prepping for a graduation party this coming Saturday  Pt continues to complain of chronic fatigue and decreased endurance when trying to walk longer distances, she started developing pain in her lower back which she has not experience since becoming sick with Covid-19  Patient has begun to establish a more concrete balance with work and home life, finding she can tolerate more in a single sitting  Objective  1  Pt will complete mental manipulation tasks of up to 4 words and 4 numbers @ 80% accuracy min-mod cue   :  FWD @ 100% acc i'ly, BWD @ 100%, ABC @ 100%  : rescramble 5 words into sentences 100% ketan, 4 words ranking 1/3 trials ketan ( verbalization) -- discontinued task following third trial : 4 words BWD @ 88% acc ketan  : 4 words ranked over /5 trial, difficulty with grandfather set (symptoms- head pressure/eye twitch)  6/3 3 numbers bwd with metronome @ 90% ketan   4 numbers BWD on metronome w/ 90% accuracy 6/10 4 word ranking 3/3 trials ketan    2  Patient will complete deductive reasoning problems with increased complexity and decreased prompt from min cue to independently @ 100% acc over 4 weeks  Goal advanced below, see goal #4  3   Pt will answer "wh" questions in response to paragraphs read to self and read outloud by clinician @ 80% ketan  :  Wh- questions, paragraph read outloud by clinician @ 88% acc ketan, increasing to 100% w/ repetition  5/19 Paragraph read out loud by clinician, answered questions @ 80% acc ketan, both eyes twitching after "[facility]" questions  4   Pt will complete high level multi-tier deductive reasoning puzzles with no more than 2 errors per puzzle  4/28 completed puzzle number 5, 5x4 with 11 clues completed @ 100% min cue   5/10:   Calendar, Frank's closet, dog breed, couples, house, puzzles 1-6 from [facility] all @ 100% acc i'ly  5/12 completed puzzle #4 [facility] 5 min 100% ketan  Advanced up to digging logic #2 gracia flowers completed together but very minimal cue needed  She was able to carryover learned information to complete digging logic puzzle #5 "picky pests" @ with min cue 1x completed 10 min  5/17:  Digging Logic puzzle 3 completed @ 100% acc with min-mod cue, puzzle 4 completed @ 50% acc increased to 100% acc when instructed to re-attempt  5/26 puzzle #7 digging logic completed @ 100% ketan in 4 min  6/1:  Rush Hour Levels 2, 6, 11 completed @ 100% acc i'ly  6/7 Rush Hour levels 4 and 5 while on metronome @ 100% ketan  6/10 Rush Hour 12 @ 2:41 ketan, level 14 @ 1:54 ketan  5   Patient will complete rapid naming tasks with familliar pictures increasing processing speed throughout  4/26:  Named "50+" items in 5 minutes  6/17:  Patient named very well but reports increased concentration required to not "go off on a tangent" in her brain regarding the item  6   Patient will name atleast 10 items in an abstract category independently in less than 2 minutes  4/19:  Presidents 16 (1 minute), kahlil stones 9, things that reflect light 6 4/26:  Bodies of water 13, furniture 11, occupations 20, trees 10 5/5 cold: 1:10 named 10 items with min cue  Hot: 12 items in 1 min  Yellow: 10 items in 1 min  (Teaching subcategories was helpful for her ) 5/17:  Things that use batteries 10/10 ketan, team sports 10/10 ketan, things you wear on your feet 10/10 ketan, school subjects 10/10 ketan, and famous last names 10/10 w/ mod  cueing  Difficulty w/ famous last names due to broad nature  6/1:  Dog breeds 14, sources of light 8, items in a kitchen 18, types of breads 9 6/3 abstract categories with metronome naming 3-6 items on average  6/7 during IM named ~4-10 items in each abstract category 6/10 Named 3-8 items per abstract category on IM  6/17:  Luis Miguel Francis List 1 Letter B 9/12, List 2 Letter F 10/12     7   Patient will complete various sustained and divided attention tasks for 2+ minutes while attending to auditory/visual stimuli with task average decreasing closer to the average range  4/21 blink cards sorting color/shape 3:57 no errors  ( utilized visual cue and self verbalization cueing to remind self of category- this helped keep her attention and reduce uncontrollable laughter)  Sort color/shape/number 2:38 no errors ( visual cue + verbalization cue)  4/28 during IM 2 min task, patient OOB and rest needed due to endurance between reps  Standing and switching hand positioning       Date Task type Task Average  SRO Early  Late  Variance    4/21 Short form task 1  Task 2  2 min visual BH 38   98  45 12  4  17 38  98  83 62  2  17 45  68  41   4/28 BH 2 min vis  " 51  67 17  9 72  89 28  11 57  43   5/12  BH 2 min vis   BH 2 min vis  73  42 17  27 55  76 45  24 96  45   5/19 BH 2min vis   " 34  38 27  21 61  75 39  25 39  38   5/26 BH 1min vis warm up  "BH 2min vis+guide  "ABC/123  " categories  41  64  87  67   25  12  8  15 71  79  67  71 29  21  33  29 46  66  100  75   6/3 BH 2 min vis/guide bolster/scan pics  " blurt cards foam mat  " r/l toe alt abs cat  " 3#'s bwd foam mat  " r/L alt foot  "R/L foot alt w/ abs cat 55    54  63   54  145  57   18    12  14  14  -  18 53    82  44  59  -  52 47    18  56  41  -  48 69    50  82  65  -  80   6/7  R/L foot alt w/ abs cat   * 4 #'s bwd w/ foam mat alt toes  * seated wiggle seat, rush hour aud  " vis cue  61  66    311    118 23  10    -    6 77  90    -    76 23  10    -    24 77  46    -    135   6/10 R/L foot alt toe tap w/ abs cat 2 min  * 3 min  Auditory R hand  Auditory color/shape/# L toe  *no guide sounds   53    51  38  194   125   11    17  -  2  6 73    77  -  81  73 27    23  -  19  27 55    51  -  193  134       8  Pt will utilize atleast 2 word finding strategies in conversation in and out of session ketan  4/19: Word finding strategies provided  4/26:  "Hit or miss" pt reports drafting emails and not sending them  WF in session x2 SC  5/4:  No WF in session today  5/5 WF x1 with picture naming ( fire extinguisher)  5/17:  No WF noted  5/19 No WF noted  6/7 No WF noted 6/10 No WF noted    9  Pt will utilize atleast 2 memory strategies in structured 3 word, # or picture immediate memory recall tasks with carryover at home  4/19:  Memory strategies provided  4/21 verbalization strategy taught in session and demonstrated effectiveness! 4/26:  Drafting emails and not sending them (attention)  5/4:  Pt reports creating a grocery store list and navigating the store by category until patient misplaced her list  5/5 4 pics recall 100% trials ketan ( verbalization/rehearsal x1-2)  6/1:  Patient reporting that she has begun misplacing items and is unable to locate where they are  Long Term Goals:  1  Pt will increase cognitive linguistic skills across all settings  2  Pt will increase physical and mental endurance across all settings  Assessment:  Patient has some concerns surrounding a graduation party this weekend including participating in conversation and endurance  She continues with fatigue and was encouraged to set her own limits       Recommendations:  -Patient would benefit from outpatient skilled Speech Therapy services : Cognitive-Linguistic therapy   Recommend neurology consult as well as optometry    -Frequency: 2x weekly  -Duration: 4-6 weeks    -Intervention certification from: 4/91/4606  -Intervention certification to: 5/14/21    -Intervention comments: puzzles, interactive metronome, memory/word finding strategies    Visit: 18

## 2021-06-21 ENCOUNTER — HOSPITAL ENCOUNTER (OUTPATIENT)
Dept: MRI IMAGING | Facility: HOSPITAL | Age: 41
Discharge: HOME | End: 2021-06-21
Payer: COMMERCIAL

## 2021-06-21 DIAGNOSIS — U09.9 PERSISTENT NEUROLOGIC SYMPTOMS AFTER COVID-19: ICD-10-CM

## 2021-06-21 DIAGNOSIS — G43.109 OCULAR MIGRAINE: ICD-10-CM

## 2021-06-21 DIAGNOSIS — R29.90 PERSISTENT NEUROLOGIC SYMPTOMS AFTER COVID-19: ICD-10-CM

## 2021-06-21 DIAGNOSIS — R41.840 ATTENTION AND CONCENTRATION DEFICIT: ICD-10-CM

## 2021-06-21 PROCEDURE — G1004 CDSM NDSC: HCPCS

## 2021-06-21 PROCEDURE — A9585 GADOBUTROL INJECTION: HCPCS | Performed by: PSYCHIATRY & NEUROLOGY

## 2021-06-21 PROCEDURE — 70553 MRI BRAIN STEM W/O & W/DYE: CPT

## 2021-06-21 RX ADMIN — GADOBUTROL 9 ML: 604.72 INJECTION INTRAVENOUS at 16:44

## 2021-06-22 ENCOUNTER — EVALUATION (OUTPATIENT)
Dept: SPEECH THERAPY | Facility: CLINIC | Age: 41
End: 2021-06-22
Payer: COMMERCIAL

## 2021-06-22 ENCOUNTER — OFFICE VISIT (OUTPATIENT)
Dept: OCCUPATIONAL THERAPY | Facility: CLINIC | Age: 41
End: 2021-06-22
Payer: COMMERCIAL

## 2021-06-22 DIAGNOSIS — U07.1 COVID-19 VIRUS DETECTED: Primary | ICD-10-CM

## 2021-06-22 DIAGNOSIS — U07.1 COVID-19 VIRUS DETECTED: ICD-10-CM

## 2021-06-22 DIAGNOSIS — R41.841 COGNITIVE COMMUNICATION DEFICIT: Primary | ICD-10-CM

## 2021-06-22 PROCEDURE — 97110 THERAPEUTIC EXERCISES: CPT

## 2021-06-22 PROCEDURE — 96125 COGNITIVE TEST BY HC PRO: CPT | Performed by: NURSE PRACTITIONER

## 2021-06-22 PROCEDURE — 97530 THERAPEUTIC ACTIVITIES: CPT

## 2021-06-22 NOTE — PROGRESS NOTES
Speech Language Pathology Cognitive-Linguistic Re-Evaluation      Today's date: 2021  Patients name: Agus Curtis  : 1980  MRN: [id number]  Safety measures: patient appears safe  Referring provider: GABRIELA Rios    Flowsheet:  Start Time: 0700  Stop Time: 0800  Total time in clinic (min): 60 minutes        Subjective Comments: Patient diagnosed with COVID-19 in 2020  Adderall started , improvement with the ability to hold a conversation  Her sustained attention at work has noticeably improved but continues to have difficulty with divided attention  Improvement with physical endurance (able to climb flights of stairs better and able to walk ~1 mile)  SOB continues to vary with too many flights of stairs or humidity factor, patient has pulmonology appointment scheduled tomorrow 21  MRI brain completed yesterday, results not yet read  EEG scheduled   Patient was seen by Neurology, pending continued work up  Completed bloodwork and patient reporting all labs WNL  Short term memory symptoms feels "the same" and exacerbated by frustration level or overhwhelming situations  Word finding difficulty has improved significantly but will still occur at times  Pt reports written language ( primarily completed via emails) has improved and written language within emails have returned back to functional  Handwriting mostly continues to be impacted by tremors  Patient reports seen by cardiology s/p full work up "normal"  Pt takes xanax taking up to 2x per day as needed, patient primarily only taking at night and helping with sleep  Sleep has returned back to baseline benefitting from a structured nighttime routine, sleeping ~8 hours uninterrupted  Neuropsychology pending apppointment  Symptoms resolved include: chest pain and no longer napping throughout the day  CXR 3/30 "Cardiomediastinal silhouette appears unremarkable  Tiny focus of linear atelectasis or scarring left costophrenic angle  The lungs are otherwise clear  No pneumothorax or pleural effusion  Osseous structures appear within normal limits for patient age    IMPRESSION:No acute cardiopulmonary disease "    Patient goal: to return to my normal life again  Reason for Referral:Change in cognitive status  Prior Functional Status:Communication appropriate and efficient in most situations  Minimal difficulty with self-monitoring, self-correction needed     Hearing:Within Normal limits  Vision:Other wears glasses, initially had blurriness  Still varies at times  Pt will be making an appointment  Medication List:   No current outpatient prescriptions on file  No current facility-administered medications for this visit  Allergies: No Known Allergies  Primary Language: English  Preferred Language: English ( primary language)   Fluent Western Neisha, Slovak, Jad ( patient reporting difficulty with expressive language in all of these languages; however, improvements noticed in all languages)  Home Environment/ Lifestyle: live home with 2 daughters,  and mother in law  Highest level of Education status: associates in [employer]  Occupation:, currently working 40 hours per week  Current / Prior Services being received: discharged from PT, transitioning towards discharge in OT  Mental Status: Alert  Behavior Status:Cooperative  Communication Modalities: Verbal  Recent Speech/ Language therapy:None  Rehabilitation Prognosis:Good rehab potential to reach the established goals       Assessments:Cognitive Assessment  Standardized Testing  The Repeatable Battery for the Assessment of Neuropsychological Status (RBANS) is a brief, individually-administered assessment which measures attention, language, visuospatial/constructional abilities, and immediate & delayed memory  The RBANS is intended for use with adolescents to adults, ages 15 to 80 years   The following results were obtained during the administration of the assessment  Comparison: form D administered 4/14/21 and form A administered today 6/22/21    Cognitive Domain/Subtest: Index Score: Percentile Rank: Classification: IE: Status:   IMMEDIATE MEMORY 120 91%ile Superior 103 IMPROVEMENT        1  List Learning (31/40)          2  Story Memory (23/24)           VISUOSPATIAL/  CONSTRUCTIONAL 126 96%ile Superior 116 IMPROVEMENT        3  Figure Copy (20/20)          4  Line Orientation (20/20)           LANGUAGE 103 58%ile Average 95 IMPROVEMENT        5  Picture Naming (10/10)          6  Semantic Fluency (24/40)           ATTENTION 115 84%ile High Average 72 IMPROVEMENT        7  Digit Span (14/16)          8  Coding (54/89)           DELAYED MEMORY 118 88%ile High Average 95 IMPROVEMENT        9  List Recall (8/10)          10  List Recognition (20/20)          11  Story Recall (12/12)          12  Figure Recall (17/20)           Sum of Index Scores:  582  481 IMPROVEMENT   Total Score:  124      Percentile: 95%ile      Classification: Superior            Goals  Current short term goals with up to date progress:  1  Pt will complete mental manipulation tasks of up to 4 words and 4 numbers @ 80% accuracy min-mod cue  Goal met    2  Patient will complete deductive reasoning problems with increased complexity and decreased prompt from min cue to independently @ 100% acc over 4 weeks  Goal advanced below, see goal #4  3   Pt will answer "wh" questions in response to paragraphs read to self and read outloud by clinician @ 80% ketan  Goal met    4  Pt will complete high level multi-tier deductive reasoning puzzles with no more than 2 errors per puzzle  Goal not met    5  Patient will complete rapid naming tasks with familliar pictures increasing processing speed throughout  Goal met    6  Patient will name atleast 10 items in an abstract category independently in less than 2 minutes  Goal met    7    Patient will complete various sustained and divided attention tasks for 2+ minutes while attending to auditory/visual stimuli with task average decreasing closer to the average range  Progress continues    Date Task type Task Average  SRO Early  Late  Variance    4/21 Short form task 1  Task 2  2 min visual BH 38   98  45 12  4  17 38  98  83 62  2  17 45  68  41   4/28 BH 2 min vis  " 51  67 17  9 72  89 28  11 57  43   5/12  BH 2 min vis   BH 2 min vis  73  42 17  27 55  76 45  24 96  45   5/19 BH 2min vis   " 34  38 27  21 61  75 39  25 39  38   5/26 BH 1min vis warm up  "BH 2min vis+guide  "ABC/123  " categories  41  64  87  67   25  12  8  15 71  79  67  71 29  21  33  29 46  66  100  75   6/3 BH 2 min vis/guide bolster/scan pics  " blurt cards foam mat  " r/l toe alt abs cat  " 3#'s bwd foam mat  " r/L alt foot  "R/L foot alt w/ abs cat 55    54  63   54  145  57   18    12  14  14  -  18 53    82  44  59  -  52 47    18  56  41  -  48 69    50  82  65  -  80   6/7  R/L foot alt w/ abs cat   * 4 #'s bwd w/ foam mat alt toes  * seated wiggle seat, rush hour aud  " vis cue  61  66    311    118 23  10    -    6 77  90    -    76 23  10    -    24 77  46    -    135   6/10 R/L foot alt toe tap w/ abs cat 2 min  * 3 min  Auditory R hand  Auditory color/shape/# L toe  *no guide sounds   53    51  38  194   125   11    17  -  2  6 73    77  -  81  73 27    23  -  19  27 55    51  -  193  134       8  Pt will utilize atleast 2 word finding strategies in conversation in and out of session ketan  Goal met    9  Pt will utilize atleast 2 memory strategies in structured 3 word, # or picture immediate memory recall tasks with carryover at home  Goal met     Long Term Goals:  1  Pt will increase cognitive linguistic skills across all settings  2  Pt will increase physical and mental endurance across all settings  UPDATE SHORT TERM GOALS:   1    Pt will complete high level multi-tier deductive reasoning puzzles with no more than 2 errors per puzzle  2   Patient will complete various sustained and divided attention tasks for 2+ minutes while attending to auditory/visual stimuli with task average decreasing closer to the average range  Long Term Goals:  1  Pt will increase cognitive linguistic skills across all settings  2  Pt will increase physical and mental endurance across all settings  Impressions/Recommendations    Impressions: Patient performing with high average to superior on standardized testing today with improvement in every single area compared to April  She scored a total of 101 points higher overall! She has made significant progress in therapy and also across all settings  We will transition her POC to continue focus on attention over ~2-3 more weeks  At that time she will transition towards a HEP  POC discussed with patient  Recommendations:  -Patient would benefit from outpatient skilled Speech Therapy services : Cognitive-Linguistic therapy   Recommend neurology consult as well as optometry    -Frequency: 2x weekly  -Duration: 2-3 more weeks    -Intervention certification from: 0/93/1700  -Intervention certification to: 4/92/11  -Intervention comments: puzzles, interactive metronome, word finding strategies  Visit: 18     rbans administered 7:00-8:00 and grading/report writing 11:45-12:45

## 2021-06-22 NOTE — PROGRESS NOTES
Daily Note     Today's date: 2021  Patient name: Fortino Montoya  : 1980  MRN: [id number]  Referring provider: GABRIELA Quintana  Dx:   Encounter Diagnosis     ICD-10-CM    1  COVID-19 virus detected  U07 1        Start Time: 0800  Stop Time: 0900  Total time in clinic (min): 60 minutes    Subjective: "The doctors couldn't explain the shakiness in my hands "      Objective: See treatment diary below      Assessment: Tolerated treatment well  Patient exhibited good technique with therapeutic exercises and would benefit from continued OT  Patient reports MRI yesterday and has an EEG scheduled at the end of July  Patient still demonstrating tremors in hands, and is unsure if they are getting worse or if she is noticing them more  She states she is able to do more at home but is still having difficulty with cooking tasks  During theraputty exercise, patient noted increase in difficulty to turn knobs  She reports having to really think about how to move her hands  Patient did well with standing UBE with cognitive task and completed 10 minutes with no break; however, patient reports SOB and dizziness following exercise  Pts HR 46 and SpO2 96%  Upon completion patient was seated and given water  Therapist encrouaged patient to take deep breaths  Shortly after sitting patients heart rate decreased to 78 and SpO2 was 96%  During UBE, patient was asked to recall three words backwards  She correctly recited 9/10 sets of words  Completing multiple things at once appears to be challenging for patient  Patient verbalized recent prescription for adderall which has improved her focus during work tasks  Patient notes her symptoms have been varying from day to day and she is unsure of what triggers her symptoms  Therapist encouraged patient to keep a memory log to make note of activities/things that trigger her symptoms  Patient reports feeling better before leaving session  Plan: Continue per plan of care  Progress treatment as tolerated         Vitals 06/01/2021 6/7/2021 06/22/2021     Pre Treatment SpO2 - 98  HR - 87   SpO2: 97%  HR: 66     During Treatment SpO2 - 98%  HR - 96  SpO2: 96  HR: 68     Post Treatment SpO2 - 97%  HR - 84  SpO2: 96  HR:78              Neuro Re-Ed                                                                        Ther Ex 06/01/2021 6/7/2021 06/22/2021     Thera-Band  Sh Ext  Triceps Ext  Retraction  Biceps Curl        Red  X 20  X 20  X 20  X 20  SpO2: 97%  HR: 82         Digi-Flex        UBE Machine  Standing L 60 10 min F - post 98% SpO2 and 88 HR Standing L 60 10 Min  SpO2: 96  HR: 140  W/ cognitive task     Nu Step        Thera-Ball  Forward Flex    Rotation    Diagonal    Step/Bounce        52 Card Exercise        Upright Bike  Executive Function Worksheet #5 100%       Theraputty                 Ther Activity 06/01/2021 6/7/2021 06/22/2021     Grooved Peg Board        Tension Pin Pom Pom        Purdue Peg Board        Brain Box w/ [employer] Activity        Laundry Management        Dynavision  A      A Endurance  B - 50% Green          A - Addition        Tension Pin on Pole        Large Peg Board        Black Peg Board   X 20 w/ tweezer  w/ around the world 6/8 correct     Multi-Matrix Super Saccades  Months/mental math      Standing kitchen recipe following        Organize The Hour        Divided Attention Activity Cancellation worksheet on table top w/ super set of word find in [place]               Modalities 06/01/2021 6/7/2021 06/22/2021

## 2021-06-23 ENCOUNTER — CONSULT (OUTPATIENT)
Dept: PULMONOLOGY | Facility: CLINIC | Age: 41
End: 2021-06-23
Payer: COMMERCIAL

## 2021-06-23 VITALS
TEMPERATURE: 97.7 F | HEIGHT: 63 IN | SYSTOLIC BLOOD PRESSURE: 108 MMHG | WEIGHT: 204.4 LBS | DIASTOLIC BLOOD PRESSURE: 77 MMHG | BODY MASS INDEX: 36.21 KG/M2 | OXYGEN SATURATION: 97 % | HEART RATE: 68 BPM

## 2021-06-23 DIAGNOSIS — E66.09 CLASS 2 OBESITY DUE TO EXCESS CALORIES WITHOUT SERIOUS COMORBIDITY WITH BODY MASS INDEX (BMI) OF 35.0 TO 35.9 IN ADULT: ICD-10-CM

## 2021-06-23 DIAGNOSIS — U09.9 COVID-19 LONG HAULER: Primary | ICD-10-CM

## 2021-06-23 PROCEDURE — 3008F BODY MASS INDEX DOCD: CPT | Performed by: INTERNAL MEDICINE

## 2021-06-23 PROCEDURE — 1036F TOBACCO NON-USER: CPT | Performed by: INTERNAL MEDICINE

## 2021-06-23 PROCEDURE — 3074F SYST BP LT 130 MM HG: CPT | Performed by: INTERNAL MEDICINE

## 2021-06-23 PROCEDURE — 3078F DIAST BP <80 MM HG: CPT | Performed by: INTERNAL MEDICINE

## 2021-06-23 PROCEDURE — 99244 OFF/OP CNSLTJ NEW/EST MOD 40: CPT | Performed by: INTERNAL MEDICINE

## 2021-06-23 NOTE — PROGRESS NOTES
Pulmonary Consultation   Marisol Laguerre 38 y o  female MRN: [id number]  6/23/2021      Referring provider: Shon OCHOA  Reason for consult: COVID    Assessment and Plan:  COVID-19 long hauler  Symptoms are consistent with COVID long hauler  CXR is normal, cardiac work up normal  Completed PT   - Discussed option of pulmonary rehab  Suspect it will be similar to PT, not sure it will add much benefit since she completed PT  Pt declines pulm rehab  - Discussed checking HRCT  If evidence of fibrosis of GGO concerning for ongoing inflammation, there will be role for therapies such as steroids or antifibrotics  - Discussed checking PFTs with 6MWT  Will give more information regarding lung function    - Discussed trial of inhaler therapy  COVID is not typical disease of airways, so not sure it will be of benefit but side effect profile is reasonable  Provided sample of Breztri and demonstrated proper inhaler technique  Instructed to use for 5 days  If no improvement, then throw away  If improved, she will call office and we can prescribe refills  Pt unsure which testing she plans to complete  She will discuss with her  and schedule the testing if she wants  Class 2 obesity due to excess calories without serious comorbidity with body mass index (BMI) of 35 0 to 35 9 in adult  - Needs weight loss  Will be difficult given shortness of breath, but should make dietary changes  Diagnoses and all orders for this visit:    [phone]  -     Ambulatory referral to Pulmonology  -     Complete PFT with post Bronchodilator and Six Minute walk; Future  -     CT chest high resolution; Future    Class 2 obesity due to excess calories without serious comorbidity with body mass index (BMI) of 35 0 to 35 9 in adult      Discussed with pt and daughter  Concerns addressed  Recommend follow up in 1 month  They will call if they would like to follow up      History of Present Illness   HPI:  Libertad Jolly Bart Wisdom is a 38 y o  female who  Presents for follow-up of COVID  She was diagnosed with COVID 2020  She was ill with COVID 11 days with fevers and had a persistent cough for a month  She was not hospitalized  She watched her home pulse ox and lowest was 92%  She has been doing PT since April  Her breathing is improved with PT  She complains of shortness of breath on exertion and if she bends forward  She is discharged from PT  She was having bradycardia during working out  She had a cardiac work up and it was normal       No known lung disease  No home inhalers  She has had her COVID vaccines  Had side effects from first shot  Here with her daughter who provides additional history      Review of Systems  Positive as mentioned above and negative otherwise    Historical Information   Past Medical History:   Diagnosis Date    Allergic rhinitis     COVID-19      Past Surgical History:   Procedure Laterality Date     SECTION      Twice    HYSTERECTOMY      Total      Family History   Problem Relation Age of Onset    Muscular dystrophy Mother     Hypertension Mother     Hypertension Father     Asthma Father     Raynaud syndrome Sister        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    Social History: briefly smoked in [employer]  Pets: Dogs  Secondhand smoke exposure: None    Meds/Allergies     Current Outpatient Medications:     ALPRAZolam (XANAX) 0 25 mg tablet, Take 1 tablet (0 25 mg total) by mouth 2 (two) times a day as needed for anxiety, Disp: 60 tablet, Rfl: 0    amphetamine-dextroamphetamine (ADDERALL XR) 5 MG 24 hr capsule, Take 1 capsule (5 mg total) by mouth every morningMax Daily Amount: 5 mg, Disp: 30 capsule, Rfl: 0    Ascorbic Acid (vitamin C) 1000 MG tablet, Take 1,000 mg by mouth daily, Disp: , Rfl:     levothyroxine 100 mcg tablet, Take 1 tablet (100 mcg total) by mouth daily, Disp: 90 tablet, Rfl: 3    metoprolol succinate (TOPROL-XL) 50 mg 24 hr tablet, Take 1 tablet (50 mg total) by mouth daily, Disp: 90 tablet, Rfl: 3    Multiple Vitamins-Minerals (MULTIVITAL PO), Take 1 capsule by mouth daily  , Disp: , Rfl:     zinc sulfate (ZINCATE) 220 mg capsule, Take 220 mg by mouth daily, Disp: , Rfl:   Allergies   Allergen Reactions    Nuts - Food Allergy Anaphylaxis     Anaphylaxis    Amoxicillin Hives    Penicillins Hives    Sulfamethoxazole-Trimethoprim Hives       Vitals: Blood pressure 108/77, pulse 68, temperature 97 7 °F (36 5 °C), temperature source Tympanic, height 5' 3" (1 6 m), weight 92 7 kg (204 lb 6 4 oz), SpO2 97 %  , Body mass index is 36 21 kg/m²  Oxygen Therapy  SpO2: 97 %  Oxygen Therapy: None (Room air)    Physical Exam  GEN: WDWN, nad, comfortable  HEENT: NCAT, EOMI  CVS: Regular, no m/r/g  LUNGS: occasional bibasilar crackles  ABD: soft, obese  EXT: No c/c/e  NEURO: No focal deficits  MS: Moving all extremities  SKIN: warm, dry  PSYCH: calm, cooperative    Labs: I have personally reviewed pertinent lab results  Lab Results   Component Value Date    WBC 7 59 03/03/2021    HGB 14 0 03/03/2021    HCT 43 1 03/03/2021    MCV 94 03/03/2021     03/03/2021     Lab Results   Component Value Date    CALCIUM 9 6 03/03/2021    K 4 4 03/03/2021    CO2 28 03/03/2021     03/03/2021    BUN 13 06/10/2021    CREATININE 0 72 06/10/2021     No results found for: IGE  Lab Results   Component Value Date    ALT 65 03/03/2021    AST 26 03/03/2021    ALKPHOS 83 03/03/2021     Labs per my interpretation show Sjogren Ab negative, Lyme normal, normal renal function, normal hemoglobin    Imaging and other studies: I have personally reviewed pertinent films in PACS  CXR per my review shows no acute disease    EKG, Pathology, and Other Studies: I have personally reviewed pertinent reports  ECHO 4/21: EF 60%    [facility]

## 2021-06-23 NOTE — ASSESSMENT & PLAN NOTE
Symptoms are consistent with COVID long hauler  CXR is normal, cardiac work up normal  Completed PT   - Discussed option of pulmonary rehab  Suspect it will be similar to PT, not sure it will add much benefit since she completed PT  Pt declines pulm rehab  - Discussed checking HRCT  If evidence of fibrosis of GGO concerning for ongoing inflammation, there will be role for therapies such as steroids or antifibrotics  - Discussed checking PFTs with 6MWT  Will give more information regarding lung function    - Discussed trial of inhaler therapy  COVID is not typical disease of airways, so not sure it will be of benefit but side effect profile is reasonable  Provided sample of Breztri and demonstrated proper inhaler technique  Instructed to use for 5 days  If no improvement, then throw away  If improved, she will call office and we can prescribe refills  Pt unsure which testing she plans to complete  She will discuss with her  and schedule the testing if she wants

## 2021-06-24 ENCOUNTER — APPOINTMENT (OUTPATIENT)
Dept: PHYSICAL THERAPY | Facility: CLINIC | Age: 41
End: 2021-06-24
Payer: COMMERCIAL

## 2021-06-24 ENCOUNTER — OFFICE VISIT (OUTPATIENT)
Dept: SPEECH THERAPY | Facility: CLINIC | Age: 41
End: 2021-06-24
Payer: COMMERCIAL

## 2021-06-24 DIAGNOSIS — R41.841 COGNITIVE COMMUNICATION DEFICIT: Primary | ICD-10-CM

## 2021-06-24 PROCEDURE — 92507 TX SP LANG VOICE COMM INDIV: CPT

## 2021-06-24 NOTE — PROGRESS NOTES
Speech-Language Pathology Treatment Note    Today's date: 2021  Patient name: Marisol Laguerre  : 1980  MRN: [id number]  Referring provider: GABRIELA Mauricio  Dx:   Encounter Diagnosis     ICD-10-CM    1  Cognitive communication deficit  R41 841      Medical History significant for:   Past Medical History:   Diagnosis Date    Allergic rhinitis     COVID-19       Flowsheet:  Start Time: 0800  Stop Time: 0900  Total time in clinic (min): 60 minutes    Subjective:  Patient arrived on time to session today  She reports that Tuesday following her re-evaluation with speech therapy she started becoming symptomatic feeling nauseated and dizzy  She had a very high HR and proceeded to go home and vomit "for a few hours "  Reviewed RBANS together, GREAT progress  Patient saw Dr Shona Valentine for pulmonary evaluation yesterday, "Symptoms are consistent with COVID long david  CXR is normal, cardiac work up normal  Completed PT   - Discussed option of pulmonary rehab  Suspect it will be similar to PT, not sure it will add much benefit since she completed PT  Pt declines pulm rehab  - Discussed checking HRCT  If evidence of fibrosis of GGO concerning for ongoing inflammation, there will be role for therapies such as steroids or antifibrotics  - Discussed checking PFTs with 6MWT  Will give more information regarding lung function    - Discussed trial of inhaler therapy  COVID is not typical disease of airways, so not sure it will be of benefit but side effect profile is reasonable  Provided sample of Breztri and demonstrated proper inhaler technique  Instructed to use for 5 days  If no improvement, then throw away  If improved, she will call office and we can prescribe refills "    Objective  1  Pt will complete high level multi-tier deductive reasoning puzzles with no more than 2 errors per puzzle    :  Digging Logic #7 @ 100% acc 6:31, #9 @ 100% acc in 7:05   2   Patient will complete various sustained and divided attention tasks for 2+ minutes while attending to auditory/visual stimuli with task average decreasing closer to the average range  Long Term Goals:  1  Pt will increase cognitive linguistic skills across all settings  2  Pt will increase physical and mental endurance across all settings  Assessment:  Patient did great in session today, reported feeling "good" about her mental endurance for tasks       Recommendations:  -Patient would benefit from outpatient skilled Speech Therapy services : Cognitive-Linguistic therapy   Recommend neurology consult as well as optometry    -Frequency: 2x weekly  -Duration: 4-6 weeks    -Intervention certification from: 4/78/4823  -Intervention certification to: 7/55/16    -Intervention comments: puzzles, interactive metronome, memory/word finding strategies    Visit: 19

## 2021-06-29 ENCOUNTER — APPOINTMENT (OUTPATIENT)
Dept: SPEECH THERAPY | Facility: CLINIC | Age: 41
End: 2021-06-29
Payer: COMMERCIAL

## 2021-06-29 ENCOUNTER — APPOINTMENT (OUTPATIENT)
Dept: OCCUPATIONAL THERAPY | Facility: CLINIC | Age: 41
End: 2021-06-29
Payer: COMMERCIAL

## 2021-06-30 DIAGNOSIS — R41.840 ATTENTION AND CONCENTRATION DEFICIT: ICD-10-CM

## 2021-06-30 RX ORDER — DEXTROAMPHETAMINE SACCHARATE, AMPHETAMINE ASPARTATE MONOHYDRATE, DEXTROAMPHETAMINE SULFATE AND AMPHETAMINE SULFATE 1.25; 1.25; 1.25; 1.25 MG/1; MG/1; MG/1; MG/1
5 CAPSULE, EXTENDED RELEASE ORAL EVERY MORNING
Qty: 30 CAPSULE | Refills: 0 | Status: SHIPPED | OUTPATIENT
Start: 2021-06-30 | End: 2021-07-07 | Stop reason: SDUPTHER

## 2021-07-01 ENCOUNTER — OFFICE VISIT (OUTPATIENT)
Dept: SPEECH THERAPY | Facility: CLINIC | Age: 41
End: 2021-07-01
Payer: COMMERCIAL

## 2021-07-01 ENCOUNTER — HOSPITAL ENCOUNTER (OUTPATIENT)
Dept: CT IMAGING | Facility: HOSPITAL | Age: 41
Discharge: HOME | End: 2021-07-01
Payer: COMMERCIAL

## 2021-07-01 DIAGNOSIS — U07.1 COVID-19 VIRUS DETECTED: Primary | ICD-10-CM

## 2021-07-01 DIAGNOSIS — U09.9 COVID-19 LONG HAULER: ICD-10-CM

## 2021-07-01 PROCEDURE — G1004 CDSM NDSC: HCPCS

## 2021-07-01 PROCEDURE — 71250 CT THORAX DX C-: CPT

## 2021-07-01 PROCEDURE — 92507 TX SP LANG VOICE COMM INDIV: CPT

## 2021-07-01 NOTE — PROGRESS NOTES
Speech-Language Pathology Treatment Note    Today's date: 2021  Patient name: Naomi Dixon  : 1980  MRN: [id number]  Referring provider: GABRIELA Mcknight  Dx:   Encounter Diagnosis     ICD-10-CM    1  COVID-19 virus detected  U07 1      Medical History significant for:   Past Medical History:   Diagnosis Date    Allergic rhinitis     COVID-19       Flowsheet:  Start Time: 0800  Stop Time: 0900  Total time in clinic (min): 60 minutes    Subjective:  Patient arrived on time to session today  She reports increased migraine activity across the past week 2' family situations and increased stress  She reports good sleep quality this week  Patient developed a cough with her prescribed inhaler and was encouraged by PCP to stop taking it  Patient saw Dr Stewart Severe for pulmonary evaluation yesterday, "Symptoms are consistent with COVID long david  CXR is normal, cardiac work up normal  Completed PT   - Discussed option of pulmonary rehab  Suspect it will be similar to PT, not sure it will add much benefit since she completed PT  Pt declines pulm rehab  - Discussed checking HRCT  If evidence of fibrosis of GGO concerning for ongoing inflammation, there will be role for therapies such as steroids or antifibrotics  - Discussed checking PFTs with [facility]  Will give more information regarding lung function    - Discussed trial of inhaler therapy  COVID is not typical disease of airways, so not sure it will be of benefit but side effect profile is reasonable  Provided sample of Breztri and demonstrated proper inhaler technique  Instructed to use for 5 days  If no improvement, then throw away  If improved, she will call office and we can prescribe refills "    Objective  1  Pt will complete high level multi-tier deductive reasoning puzzles with no more than 2 errors per puzzle    :  Digging Logic #7 @ 100% acc 6:31, #9 @ 100% acc in 7:05   :  Digging Logic #10 @ 100% acc i'ly in 5:02, #14 @ 100% acc i'ly in 3:58, #20 @ 100% acc 8:28     2   Patient will complete various sustained and divided attention tasks for 2+ minutes while attending to auditory/visual stimuli with task average decreasing closer to the average range  Long Term Goals:  1  Pt will increase cognitive linguistic skills across all settings  2  Pt will increase physical and mental endurance across all settings  Assessment:  Patient had great accuracy and completion time for activities in session today, however, patient developed R eye twitching part way through deductive reasoning puzzles that worsened with progression and time  Patient also developed pressure at the base of her skull  Recommendations:  -Patient would benefit from outpatient skilled Speech Therapy services : Cognitive-Linguistic therapy   Recommend neurology consult as well as optometry    -Frequency: 2x weekly  -Duration: 2-3 more weeks    -Intervention certification from: 9/79/7158  -Intervention certification to: 9/17/56  -Intervention comments: puzzles, interactive metronome, word finding strategies  Visit: 20    rbans administered 7:00-8:00 and grading/report writing 11:45-12:45

## 2021-07-06 ENCOUNTER — HOSPITAL ENCOUNTER (OUTPATIENT)
Dept: PULMONOLOGY | Facility: HOSPITAL | Age: 41
Discharge: HOME | End: 2021-07-06
Payer: COMMERCIAL

## 2021-07-06 ENCOUNTER — HOSPITAL ENCOUNTER (OUTPATIENT)
Dept: NEUROLOGY | Facility: CLINIC | Age: 41
Discharge: HOME | End: 2021-07-06
Payer: COMMERCIAL

## 2021-07-06 DIAGNOSIS — R41.840 ATTENTION AND CONCENTRATION DEFICIT: ICD-10-CM

## 2021-07-06 DIAGNOSIS — U09.9 COVID-19 LONG HAULER: ICD-10-CM

## 2021-07-06 DIAGNOSIS — U09.9 PERSISTENT NEUROLOGIC SYMPTOMS AFTER COVID-19: ICD-10-CM

## 2021-07-06 DIAGNOSIS — R29.90 PERSISTENT NEUROLOGIC SYMPTOMS AFTER COVID-19: ICD-10-CM

## 2021-07-06 PROCEDURE — 95816 EEG AWAKE AND DROWSY: CPT

## 2021-07-06 PROCEDURE — 94726 PLETHYSMOGRAPHY LUNG VOLUMES: CPT | Performed by: INTERNAL MEDICINE

## 2021-07-06 PROCEDURE — 94060 EVALUATION OF WHEEZING: CPT

## 2021-07-06 PROCEDURE — 94618 PULMONARY STRESS TESTING: CPT | Performed by: INTERNAL MEDICINE

## 2021-07-06 PROCEDURE — 94726 PLETHYSMOGRAPHY LUNG VOLUMES: CPT

## 2021-07-06 PROCEDURE — 94729 DIFFUSING CAPACITY: CPT

## 2021-07-06 PROCEDURE — 94060 EVALUATION OF WHEEZING: CPT | Performed by: INTERNAL MEDICINE

## 2021-07-06 PROCEDURE — 95816 EEG AWAKE AND DROWSY: CPT | Performed by: PSYCHIATRY & NEUROLOGY

## 2021-07-06 PROCEDURE — 94618 PULMONARY STRESS TESTING: CPT

## 2021-07-06 PROCEDURE — 94729 DIFFUSING CAPACITY: CPT | Performed by: INTERNAL MEDICINE

## 2021-07-06 RX ORDER — ALBUTEROL SULFATE 2.5 MG/3ML
2.5 SOLUTION RESPIRATORY (INHALATION) ONCE AS NEEDED
Status: COMPLETED | OUTPATIENT
Start: 2021-07-06 | End: 2021-07-06

## 2021-07-06 RX ADMIN — ALBUTEROL SULFATE 2.5 MG: 2.5 SOLUTION RESPIRATORY (INHALATION) at 16:40

## 2021-07-07 ENCOUNTER — TRANSITIONAL CARE MANAGEMENT (OUTPATIENT)
Dept: NEUROLOGY | Facility: CLINIC | Age: 41
End: 2021-07-07

## 2021-07-07 DIAGNOSIS — R41.840 ATTENTION AND CONCENTRATION DEFICIT: ICD-10-CM

## 2021-07-07 RX ORDER — DEXTROAMPHETAMINE SACCHARATE, AMPHETAMINE ASPARTATE MONOHYDRATE, DEXTROAMPHETAMINE SULFATE AND AMPHETAMINE SULFATE 1.25; 1.25; 1.25; 1.25 MG/1; MG/1; MG/1; MG/1
5 CAPSULE, EXTENDED RELEASE ORAL EVERY MORNING
Qty: 30 CAPSULE | Refills: 0 | Status: SHIPPED | OUTPATIENT
Start: 2021-07-07 | End: 2021-08-14 | Stop reason: SDUPTHER

## 2021-07-07 RX ORDER — DEXTROAMPHETAMINE SACCHARATE, AMPHETAMINE ASPARTATE MONOHYDRATE, DEXTROAMPHETAMINE SULFATE AND AMPHETAMINE SULFATE 1.25; 1.25; 1.25; 1.25 MG/1; MG/1; MG/1; MG/1
5 CAPSULE, EXTENDED RELEASE ORAL EVERY MORNING
Qty: 30 CAPSULE | Refills: 0 | Status: CANCELLED | OUTPATIENT
Start: 2021-07-07

## 2021-07-08 ENCOUNTER — OFFICE VISIT (OUTPATIENT)
Dept: SPEECH THERAPY | Facility: CLINIC | Age: 41
End: 2021-07-08
Payer: COMMERCIAL

## 2021-07-08 DIAGNOSIS — R41.841 COGNITIVE COMMUNICATION DEFICIT: ICD-10-CM

## 2021-07-08 DIAGNOSIS — U07.1 COVID-19 VIRUS DETECTED: Primary | ICD-10-CM

## 2021-07-08 PROCEDURE — 92507 TX SP LANG VOICE COMM INDIV: CPT | Performed by: NURSE PRACTITIONER

## 2021-07-08 NOTE — PROGRESS NOTES
Speech-Language Pathology Treatment Note    Today's date: 2021  Patient name: Keshia Hardy  : 1980  MRN: [id number]  Referring provider: GABRIELA Madison  Dx:   Encounter Diagnosis     ICD-10-CM    1  COVID-19 virus detected  U07 1    2  Cognitive communication deficit  R41 841      Medical History significant for:   Past Medical History:   Diagnosis Date    Allergic rhinitis     COVID-19       Flowsheet:  Start Time: 07  Stop Time: 0800  Total time in clinic (min): 60 minutes    Subjective:  Patient arrived on time to session today  S/p EEG, MRI brain, CT chest, PFT  Next neurology appointment is in August, seen by Dr Diamante Martines ( pulmonology)  She reports good sleep quality this week  Work has been "ok", continues with ej  Pt was seen by her eye doctor and no new change in script  Completed Physical Therapy and Occupational Therapy  Objective  1  Pt will complete high level multi-tier deductive reasoning puzzles with no more than 2 errors per puzzle  :  Digging Logic #7 @ 100% acc 6:31, #9 @ 100% acc in 7:05   :  Digging Logic #10 @ 100% acc i'ly in 5:02, #14 @ 100% acc i'ly in 3:58, #20 @ 100% acc 8:28    level B puzzle multi tier First name/lastname/ranking with 2 errors solved in 8 min  2   Patient will complete various sustained and divided attention tasks for 2+ minutes while attending to auditory/visual stimuli with task average decreasing closer to the average range  7/8     Date Task type Task Average  SRO Early  Late  Variance     2 5Aud L toe color/shape/#  * rush hour level   * R toe path words level 3// 90  92  95 12  13  12 80  69  58 20  31  42 90  109  119                                                                Long Term Goals:  1  Pt will increase cognitive linguistic skills across all settings  2  Pt will increase physical and mental endurance across all settings        Assessment:  Patient had great accuracy and completion time for activities in session today, however, patient developed R eye twitching part way through deductive reasoning puzzles that worsened with progression and time  Patient also developed pressure at the base of her skull  Recommendations:  -Patient would benefit from outpatient skilled Speech Therapy services : Cognitive-Linguistic therapy   Recommend neurology consult as well as optometry    -Frequency: 2x weekly  -Duration: 2-3 more weeks    -Intervention certification from: 0/97/8314  -Intervention certification to: 0/27/39  -Intervention comments: puzzles, interactive metronome, word finding strategies  Visit: 21    rbans administered 7:00-8:00 and grading/report writing 11:45-12:45

## 2021-07-13 ENCOUNTER — OFFICE VISIT (OUTPATIENT)
Dept: SPEECH THERAPY | Facility: CLINIC | Age: 41
End: 2021-07-13
Payer: COMMERCIAL

## 2021-07-13 DIAGNOSIS — R41.841 COGNITIVE COMMUNICATION DEFICIT: ICD-10-CM

## 2021-07-13 DIAGNOSIS — U07.1 COVID-19 VIRUS DETECTED: Primary | ICD-10-CM

## 2021-07-13 PROCEDURE — 92507 TX SP LANG VOICE COMM INDIV: CPT | Performed by: NURSE PRACTITIONER

## 2021-07-13 NOTE — PROGRESS NOTES
Speech-Language Pathology Treatment Note    Today's date: 2021  Patient name: Nayeli Dueñas  : 1980  MRN: [id number]  Referring provider: GABRIELA Becerra  Dx:   Encounter Diagnosis     ICD-10-CM    1  COVID-19 virus detected  U07 1    2  Cognitive communication deficit  R41 841      Medical History significant for:   Past Medical History:   Diagnosis Date    Allergic rhinitis     COVID-19       Flowsheet:  Start Time: 0800  Stop Time: 0900  Total time in clinic (min): 60 minutes    Subjective:  Patient arrived on time to session today  S/p EEG, MRI brain, CT chest, PFT  She reports speaking with Dr Génesis Varner about all these results  Reports no change in medical POC at this time  Next neurology appointment is in August   She reports good sleep quality last night but with nightmares  Work has been "ok", continues with aderol  Pt was seen by her eye doctor and no new change in script  Completed and discharged from Physical Therapy and Occupational Therapy end of   Objective  1  Pt will complete high level multi-tier deductive reasoning puzzles with no more than 2 errors per puzzle  :  Digging Logic #7 @ 100% acc 6:31, #9 @ 100% acc in 7:05   :  Digging Logic #10 @ 100% acc i'ly in 5:02, #14 @ 100% acc i'ly in 3:58, #20 @ 100% acc 8:28    level B puzzle multi tier First name/lastname/ranking with 2 errors solved in 8 min  2   Patient will complete various sustained and divided attention tasks for 2+ minutes while attending to auditory/visual stimuli with task average decreasing closer to the average range   right eye twitch following second task, no errors with card sorting tasks  Named 10 items in list 4 letter /s/  Introduced to [place] level 1 completed ketan ~2 min, level 6 solved  ketan ~3min       Date Task type Task Average  SRO Early  Late  Variance     2 5Aud L toe color/shape/#  * rush hour level   * R toe path words level 3// 90  92  95 12  13  12 80  69  58 20  31  42 90  109  119   7/13 2 5aud Ltoe color/shape/# guide sounds  " no guide sounds  *scattegories list4 letter /s/  *rush hour level 9/10 117    126  106  87 6    6  9  13 83    80  84  51 17    20  16  49 94    117  105  113                                                       Long Term Goals:  1  Pt will increase cognitive linguistic skills across all settings  2  Pt will increase physical and mental endurance across all settings  Assessment:  Patient had great accuracy and completion time for activities in session today, however, patient developed R eye twitching during sorting card activity with metronome  part way through deductive reasoning puzzles that worsened with progression and time  Patient also developed pressure at the base of her skull       Recommendations:  -Patient would benefit from outpatient skilled Speech Therapy services : Cognitive-Linguistic therapy   Recommend neurology consult as well as optometry    -Frequency: 2x weekly  -Duration: 2-3 more weeks    -Intervention certification from: 2/69/7494  -Intervention certification to: 1/14/85  -Intervention comments: puzzles, interactive metronome, word finding strategies  Visit: 22   Homework: deductive reasoning mind dumont puzzle B, #7

## 2021-07-15 ENCOUNTER — OFFICE VISIT (OUTPATIENT)
Dept: SPEECH THERAPY | Facility: CLINIC | Age: 41
End: 2021-07-15
Payer: COMMERCIAL

## 2021-07-15 DIAGNOSIS — R41.841 COGNITIVE COMMUNICATION DEFICIT: ICD-10-CM

## 2021-07-15 DIAGNOSIS — U07.1 COVID-19 VIRUS DETECTED: Primary | ICD-10-CM

## 2021-07-15 PROCEDURE — 92507 TX SP LANG VOICE COMM INDIV: CPT | Performed by: NURSE PRACTITIONER

## 2021-07-15 NOTE — PROGRESS NOTES
Speech-Language Pathology Treatment Note    Today's date: 7/15/2021  Patient name: Nayeli Dueñas  : 1980  MRN: [id number]  Referring provider: GABRIELA Becerra  Dx:   Encounter Diagnosis     ICD-10-CM    1  COVID-19 virus detected  U07 1    2  Cognitive communication deficit  R41 841      Medical History significant for:   Past Medical History:   Diagnosis Date    Allergic rhinitis     COVID-19       Flowsheet:  Start Time: 0700  Stop Time: 0800  Total time in clinic (min): 60 minutes    Subjective:  Patient arrived on time to session today  S/p EEG, MRI brain, CT chest, PFT  She reports speaking with Dr Génesis Varner about all these results  Reports no change in medical POC at this time  Next neurology appointment is in August  She reports good sleep quality last night  Work has been "ok", continues with aderol  Office moved to first floor of the house vs  Second floor  Improved focus and able to get more accomplished with work because she wasn't "as exhausted going up and down the stairs"  Pt was seen by her eye doctor and no new change in script  Completed and discharged from Physical Therapy and Occupational Therapy end of   Objective  1  Pt will complete high level multi-tier deductive reasoning puzzles with no more than 2 errors per puzzle  :  Digging Logic #7 @ 100% acc 6:31, #9 @ 100% acc in 7:05   :  Digging Logic #10 @ 100% acc i'ly in 5:02, #14 @ 100% acc i'ly in 3:58, #20 @ 100% acc 8:28    level B puzzle multi tier First name/lastname/ranking with 2 errors solved in 8 min  7/15 puzzle B7 completed with 2 errors @ 19:57      2   Patient will complete various sustained and divided attention tasks for 2+ minutes while attending to auditory/visual stimuli with task average decreasing closer to the average range  7/ right eye twitch following second task, no errors with card sorting tasks  Named 10 items in list 4 letter /s/   Introduced to [place] level 1 completed ketan ~2 min, level 6 solved  ketan ~3min  7/15 interactive metronome activities: sort color/shape/# without any errors, solved rush hour level 11 with 2 0 minutes ketan! Level 12 rush hours completed ketan 2 min  scattegories list 3 letter /t/ named 7 with IM and increased to 12 with IM off ~4 minutes total  Gravity maze level 4 solved in 2 5 min ketan  [place] maze level 8 without metronome completed @ 7:26 with min-mod cue  Date Task type Task Average  SRO Early  Late  Variance    7/8 2 5Aud L toe color/shape/#  * rush hour level 7/8  * R toe path words level 3/4/5 90  92  95 12  13  12 80  69  58 20  31  42 90  109  119   7/13 2 5aud Ltoe color/shape/# guide sounds  " no guide sounds  *scattegories list4 letter /s/  *rush hour level 9/10 117    126  106  87 6    6  9  13 83    80  84  51 17    20  16  49 94    117  105  113   7/15 2 5aud Ltoe color/shape/# 73 15 66 34 83    * rush hour level 11 102 13 71 29 126    *scattegories list 3 letter /t/ 102 16 72 28 136    * gravity maze level 4 97 12 60 40 122                   Long Term Goals:  1  Pt will increase cognitive linguistic skills across all settings  2  Pt will increase physical and mental endurance across all settings  Assessment:  Patient had great accuracy and completion time for activities in session today  Demonstrating improved processing time and task complexity! R eye twitch started in beginning of session remained the same with all other activities completed today but increased with scattegories       Recommendations:  -Patient would benefit from outpatient skilled Speech Therapy services : Cognitive-Linguistic therapy   Recommend neurology consult as well as optometry    -Frequency: 2x weekly  -Duration: 2-3 more weeks    -Intervention certification from: 5/74/5804  -Intervention certification to: 2/19/83  -Intervention comments: puzzles, interactive metronome, word finding strategies  Visit: 23  Homework: deductive reasoning mind julia HESS, #7 , mind julia HESS #8

## 2021-07-16 ENCOUNTER — TELEPHONE (OUTPATIENT)
Dept: NEUROLOGY | Facility: CLINIC | Age: 41
End: 2021-07-16

## 2021-07-16 NOTE — TELEPHONE ENCOUNTER
Spoke to patient  Informed of MRI results as well as EEG results  Verbalized understanding  Nothing further at this time

## 2021-07-16 NOTE — TELEPHONE ENCOUNTER
----- Message from Lindsay Victoria DO sent at 7/16/2021  9:27 AM EDT -----  Would you call the pt and let them know the MRI is completely normal  Thank you!    - Zee Man DO   Neurology [facility] would you be able to contact the patient and let them know EEG is normal  Thank you!

## 2021-07-22 ENCOUNTER — OFFICE VISIT (OUTPATIENT)
Dept: SPEECH THERAPY | Facility: CLINIC | Age: 41
End: 2021-07-22
Payer: COMMERCIAL

## 2021-07-22 DIAGNOSIS — U07.1 COVID-19 VIRUS DETECTED: Primary | ICD-10-CM

## 2021-07-22 DIAGNOSIS — R41.841 COGNITIVE COMMUNICATION DEFICIT: ICD-10-CM

## 2021-07-22 PROCEDURE — 92507 TX SP LANG VOICE COMM INDIV: CPT | Performed by: NURSE PRACTITIONER

## 2021-07-22 NOTE — PROGRESS NOTES
Speech-Language Pathology Treatment Note    Today's date: 2021  Patient name: Hiro Anaya  : 1980  MRN: [id number]  Referring provider: GABRIELA Rudolph  Dx:   Encounter Diagnosis     ICD-10-CM    1  COVID-19 virus detected  U07 1    2  Cognitive communication deficit  R41 841      Medical History significant for:   Past Medical History:   Diagnosis Date    Allergic rhinitis     COVID-19       Flowsheet:  Start Time: 0700  Stop Time: 0800  Total time in clinic (min): 60 minutes    Subjective:  Patient arrived on time to session today  S/p EEG, MRI brain, CT chest, PFT  She reports speaking with Dr Chai Zimmer about all these results  Next neurology appointment is in August  Pt doing renovations in her house with her family, organzation from upstairs living to downstairs living  Reporting panic attack on the the floor  Later that night patient reporting she took aderol instead of her her xanax in error  Spelling and error mistakes still occurring  Pt reporting pending a phonecall from neuropsychology when appointment is available  Pt was seen by her eye doctor and no new change in script  Completed and discharged from Physical Therapy and Occupational Therapy end of   Objective  1  Pt will complete high level multi-tier deductive reasoning puzzles with no more than 2 errors per puzzle  :  Digging Logic #7 @ 100% acc 6:31, #9 @ 100% acc in 7:05   :  Digging Logic #10 @ 100% acc i'ly in 5:02, #14 @ 100% acc i'ly in 3:58, #20 @ 100% acc 8:28    level B puzzle multi tier First name/lastname/ranking with 2 errors solved in 8 min  7/15 puzzle B7 completed with 2 errors @ 19:57   returned mind dumont B puzzle 8 completed with 4 errors in 18 min  2   Patient will complete various sustained and divided attention tasks for 2+ minutes while attending to auditory/visual stimuli with task average decreasing closer to the average range    right eye twitch following second task, no errors with card sorting tasks  Named 10 items in list 4 letter /s/  Introduced to [place] level 1 completed ketan ~2 min, level 6 solved  ketan ~3min  7/15 interactive metronome activities: sort color/shape/# without any errors, solved rush hour level 11 with 2 0 minutes ketan! Level 12 rush hours completed ketan 2 min  scattegories list 3 letter /t/ named 7 with IM and increased to 12 with IM off ~4 minutes total  Gravity maze level 4 solved in 2 5 min ketan  [place] maze level 8 without metronome completed @ 7:26 with min-mod cue  7/22 scattegories list 9 letter /L/ naming 5 items in 2 5 min on metronome, list 10 letter /m/ 7 items  Open ended picture written description 4 5 sentences in 3 min while completing IM  No grammar or spelling mistakes  Date Task type Task Average  SRO Early  Late  Variance    7/22 LT aud 2sym/2color cancel  "  *RT scattegories [id number]  " list 10 letter /m/  *3min written open ended picture  86  81  69  71  162 15  11  17  -  9 49  55  50  -  50 51  45  50  -  50 119  109  96  -  214                                              Long Term Goals:  1  Pt will increase cognitive linguistic skills across all settings  2  Pt will increase physical and mental endurance across all settings  Assessment:  Patient had great accuracy and completion time for activities in session today   Working towards writing while doing IM     Recommendations:  -Patient would benefit from outpatient skilled Speech Therapy services : Cognitive-Linguistic therapy   Recommend neurology consult as well as optometry    -Frequency: 2x weekly  -Duration: 2-3 more weeks    -Intervention certification from: 2/43/7748  -Intervention certification to: 2/37/34  -Intervention comments: puzzles, interactive metronome, word finding strategies  Visit: 24  Homework: deductive reasoning mind dumont puzzle B, #7 , mind dumont B #8, mind [place] B #9

## 2021-07-27 ENCOUNTER — OFFICE VISIT (OUTPATIENT)
Dept: SPEECH THERAPY | Facility: CLINIC | Age: 41
End: 2021-07-27
Payer: COMMERCIAL

## 2021-07-27 DIAGNOSIS — U07.1 COVID-19 VIRUS DETECTED: Primary | ICD-10-CM

## 2021-07-27 DIAGNOSIS — R41.841 COGNITIVE COMMUNICATION DEFICIT: ICD-10-CM

## 2021-07-27 PROCEDURE — 92507 TX SP LANG VOICE COMM INDIV: CPT | Performed by: NURSE PRACTITIONER

## 2021-07-27 NOTE — PROGRESS NOTES
Speech-Language Pathology Treatment Note    Today's date: 2021  Patient name: Andreina Palomino  : 1980  MRN: [id number]  Referring provider: GABRIELA Garcia  Dx:   Encounter Diagnosis     ICD-10-CM    1  COVID-19 virus detected  U07 1    2  Cognitive communication deficit  R41 841      Medical History significant for:   Past Medical History:   Diagnosis Date    Allergic rhinitis     COVID-19       Flowsheet:  Start Time: 0800  Stop Time: 0900  Total time in clinic (min): 60 minutes    Subjective:  Patient arrived on time to session today  S/p EEG, MRI brain, CT chest, PFT  She reports speaking with Dr Yeyo Reyes about all these results  Next neurology appointment is in August  Pt doing renovations in her house with her family, organzation from upstairs living to downstairs living  Reporting panic attack on the the floor  Later that night patient reporting she took aderol instead of her her xanax in error  Spelling and error mistakes still occurring  Pt reporting pending a phonecall from neuropsychology when appointment is available  Pt was seen by her eye doctor and no new change in script  Completed and discharged from Physical Therapy and Occupational Therapy end of   Objective  1  Pt will complete high level multi-tier deductive reasoning puzzles with no more than 2 errors per puzzle  :  Digging Logic #7 @ 100% acc 6:31, #9 @ 100% acc in 7:05   :  Digging Logic #10 @ 100% acc i'ly in 5:02, #14 @ 100% acc i'ly in 3:58, #20 @ 100% acc 8:28    level B puzzle multi tier First name/lastname/ranking with 2 errors solved in 8 min  7/15 puzzle B7 completed with 2 errors @ 19:57   returned mind dumont B puzzle 8 completed with 4 errors in 18 min   returned B puzzle #9 100% ketan solved in ~30 min       2   Patient will complete various sustained and divided attention tasks for 2+ minutes while attending to auditory/visual stimuli with task average decreasing closer to the average range  7/13 right eye twitch following second task, no errors with card sorting tasks  Named 10 items in list 4 letter /s/  Introduced to [place] level 1 completed ketan ~2 min, level 6 solved  ketan ~3min  7/15 interactive metronome activities: sort color/shape/# without any errors, solved rush hour level 11 with 2 0 minutes ketan! Level 12 rush hours completed ketan 2 min  scattegories list 3 letter /t/ named 7 with IM and increased to 12 with IM off ~4 minutes total  Gravity maze level 4 solved in 2 5 min ketan  [place] maze level 8 without metronome completed @ 7:26 with min-mod cue  7/22 scattegories list 9 letter /L/ naming 5 items in 2 5 min on metronome, list 10 letter /m/ 7 items  Open ended picture written description 4 5 sentences in 3 min while completing IM  No grammar or spelling mistakes  7/27 open ended written questions with IM 4 5 sentences without any grammar errors or spelling mistakes  2nd trial same writing activity completed with 4 sentences without any grammar or spelling errors  Gravity maze level 6/7 with IM solved ketan  [place] maze level 10 without IM solved ketan 4:20  Date Task type Task Average  SRO Early  Late  Variance    7/22 LT aud 2sym/2color cancel  "  *RT scattegories [id number]  " list 10 letter /m/  *3min written open ended picture  86  81  69  71  162 15  11  17  -  9 49  55  50  -  50 51  45  50  -  50 119  109  96  -  214   7/27  3min RT open ended written ques  " LT  *gravity maze level 6/7  *hidden picture  151    159  142  72     9    5  11  11 57    61  59  72 43    39  41  28 185    200  181  85                                     Long Term Goals:  1  Pt will increase cognitive linguistic skills across all settings  2  Pt will increase physical and mental endurance across all settings  Assessment:  Patient had great accuracy and completion time for activities in session today  Working towards writing while doing IM   Pt's excellent performance in session not with consistent carryover across all settings  Patient concerned about returning back to driving, I reassured her that as far as her cognitive performance she is safe for driving  I encouraged her to speak with her neurologist regarding returning back to driving  She may even benefit from fitness to drive testing to ease her mind  She would greatly benefit from neuropsychology testing at this time but she hasn't receive a call to set up an appointment yet  I provided her with the contact information to call and f/u with the office regarding scheduling an appointment  We did talk about anxiety and depression mental health components today that she does confirm feeling both and these could certainly serve as a barrier to her continued success and carryover  Re-evaluation set up for next session but we are most likely transitioning towards discharge        Recommendations:  -Patient would benefit from outpatient skilled Speech Therapy services : Cognitive-Linguistic therapy   Recommend neurology consult as well as optometry    -Frequency: 2x weekly  -Duration: 2-3 more weeks    -Intervention certification from: 6/60/4684  -Intervention certification to: 5/83/93  -Intervention comments: puzzles, interactive metronome, word finding strategies  Visit: 26  Homework: deductive reasoning mind dumont puzzle B, #7 , mind dumont B #8, mind dumont B #9, mind dumont B #11

## 2021-07-28 NOTE — PROGRESS NOTES
OT DISCHARGE    Patient has Consistent attended OP OT services since start of care  Patient has attended 18 visits since start of care  Patient last missed visit/visit was on 06/22/2021  Patient and therapist discussed discontinued and have agreed on Plan of Care  Patient achieved all goals in plan of care  Patient has achieved maximum potential  Thank you for the referral  Please refer to patient's last assessment for most recent objective measurements

## 2021-07-29 ENCOUNTER — APPOINTMENT (OUTPATIENT)
Dept: SPEECH THERAPY | Facility: CLINIC | Age: 41
End: 2021-07-29
Payer: COMMERCIAL

## 2021-08-03 ENCOUNTER — EVALUATION (OUTPATIENT)
Dept: SPEECH THERAPY | Facility: CLINIC | Age: 41
End: 2021-08-03
Payer: COMMERCIAL

## 2021-08-03 DIAGNOSIS — U07.1 COVID-19 VIRUS DETECTED: Primary | ICD-10-CM

## 2021-08-03 PROCEDURE — 92507 TX SP LANG VOICE COMM INDIV: CPT

## 2021-08-04 ENCOUNTER — DOCUMENTATION (OUTPATIENT)
Dept: NEUROLOGY | Facility: CLINIC | Age: 41
End: 2021-08-04

## 2021-08-04 NOTE — TELEPHONE ENCOUNTER
Called patient to schedule intake/testing   Scheduled for 8/25/21 for virtual intake with Dr Leron Kehr and testing 8/31/21

## 2021-08-04 NOTE — PROGRESS NOTES
No authorization for CPT codes [id number], [id number], [id number], [id number], [id number], [id number], [id number], [id number]  Person of contact: Agustín Joshi   Date of Contact: 8/4/2021  Reference #[place] M 8/4/2021

## 2021-08-05 ENCOUNTER — TELEPHONE (OUTPATIENT)
Dept: INTERNAL MEDICINE CLINIC | Facility: CLINIC | Age: 41
End: 2021-08-05

## 2021-08-05 ENCOUNTER — TELEPHONE (OUTPATIENT)
Dept: NEUROLOGY | Facility: CLINIC | Age: 41
End: 2021-08-05

## 2021-08-05 ENCOUNTER — OFFICE VISIT (OUTPATIENT)
Dept: SPEECH THERAPY | Facility: CLINIC | Age: 41
End: 2021-08-05
Payer: COMMERCIAL

## 2021-08-05 DIAGNOSIS — E07.9 THYROID DISEASE: ICD-10-CM

## 2021-08-05 DIAGNOSIS — U07.1 COVID-19 VIRUS INFECTION: ICD-10-CM

## 2021-08-05 DIAGNOSIS — U07.1 COVID-19 VIRUS DETECTED: Primary | ICD-10-CM

## 2021-08-05 DIAGNOSIS — R41.841 COGNITIVE COMMUNICATION DEFICIT: ICD-10-CM

## 2021-08-05 PROCEDURE — 92507 TX SP LANG VOICE COMM INDIV: CPT | Performed by: NURSE PRACTITIONER

## 2021-08-05 RX ORDER — LEVOTHYROXINE SODIUM 0.1 MG/1
100 TABLET ORAL DAILY
Qty: 90 TABLET | Refills: 1 | Status: SHIPPED | OUTPATIENT
Start: 2021-08-05 | End: 2022-01-27

## 2021-08-05 RX ORDER — ALPRAZOLAM 0.25 MG/1
0.25 TABLET ORAL 2 TIMES DAILY PRN
Qty: 60 TABLET | Refills: 0 | Status: SHIPPED | OUTPATIENT
Start: 2021-08-05 | End: 2021-11-12 | Stop reason: SDUPTHER

## 2021-08-05 NOTE — TELEPHONE ENCOUNTER
----- Message from Jesus Mattsongucci Ritchie sent at 8/5/2021 12:32 PM EDT -----  Regarding: RE: FW: re: tests  Contact: [phone]  Thank you!    ----- Message -----  From: Kelsey Scherer  Sent: 8/5/21, 12:31 PM  To: Walter Barakat  Subject: RE: FW: re: tests    Hi  Your Rx requests have been sent to [place]  They should be at your pharmacy later today       ----- Message -----       [id number] [employer]       Sent:8/4/2021  2:44 PM EDT         To:GABRIELA Landry    Subject:RE: FW: re: tests    Hi  I was wondering if I could get a refill of the Xanax and my thyroid med? I'm not sure if [facility] reached out to the office or not  Osmany Stafford      ----- Message -----       GABRIELA Cameron       Sent:7/12/2021 12:08 PM EDT         To:Mya Ahmadi    Subject:RE: FW: re: tests    GOD I HOPE NOT! Ugh    Let me think of some more things that maybe we can try    I'll be back in touch       ----- Message -----       From:Mya Ahmadi       Sent:7/12/2021  2:09 AM EDT         To:GABRIELA Landry    Subject:RE: re: tests    Well yay  While I am happy with the normal results of the testing, I can't help but be more frustrated than ever before  The adderall has helped my brain, but we still have no reason for what happened to my brain in the first place  I am a grown adult that can't drive a car, I have nonstop dr appts that are leaving me drowning in dr abreu, which I can't even comprehend because of my stupid brain that I'm left with- all for my  to deal with  My hands and feet tremor and twitch and I haven't held my 's hand in months because he feels uncomfortable with the whole thing  My sister is coming over at the end of this week to help my  rearrange the household because I can't keep living upstairs anymore   While physical therapy has helped immensely, if I forget something upstairs and I have to climb them 2 or more times I'm left short of breath and eventually with a migraine that knocks me out for hours, which is now happening a few times a week  Since that stupid eeg test I now   have an ice pick feeling in the left side of my head and the right side feels like it's on fire when I try to think too hard  The migraines are increasing as I try to do more  My  has been looking for a new job for the past year was recently received an offer from a company and had to turn it down because of how high maintenance I've become  I can't cook  I was raised in an [place] family and food is everything  I can't cook a simple meal  I can't put the ingredients together, I can't keep the thoughts in my head to remember all the steps, I can't hold knives to cut vegetables and everything is burning  I have to keep a chair next to the stove because I can't stand while it cooks and if I go sit down somewhere else I forget that I'm cooking  Every day this week there has been a cloud of smoke in the downstairs when I have attempted to feed my family  This isn't me  I hate putting all this on my family  It all goes to quality of life  I wake up early, get driven to dr tinajero, come home and work, which takes me at least 10 hours to do 8 hours of work because of my stupid brain, and then try to get up and get moving and burn dinner  Weekends I usually have migraines and have to isolate while my  tries to get out and either do housework or do something that I would have been doing with him  We used to be a team and were looking forward to being empty nesters and enjoying life  I'm done with physical therapy and occupational therapy, not because I'm fixed but because I stopped showing signs of improvement  I can see that speech therapy is happy with how far I've come and I've done everything that has been asked of me  I'm guessing that they will discharge me soon as well  I'm still having awful eye twitches and pressure and migraines, but every test is great   I have no diagnosis other than that I'm a long hauler, which means nothing to anyone really  I'm taking 7 meds and vitamins every day and I'm still not me and it sounds like I'm never going to be me again  Are we at the point where I have to just accept that this is it?        ----- Message -----       From:GABRIELA Landry       Sent:7/11/2021 10:07 PM EDT         Juliano Aparicio    Subject:re: tests    Okay, your EEG was determined to be Normal! The CT scan of your chest has not been read yet, I will have the staff call to radiology on 7/12 am (we have had a few things not read lately! [facility]) as soon as we get those results I will let you know  Your PFT results are excellent! Your total lung capacity is 4 89 liters which is 99% predicted value and your residual volume is 95% predicted!  All good!!!

## 2021-08-05 NOTE — PROGRESS NOTES
Speech-Language Pathology Treatment Note    Today's date: 2021  Patient name: Corie Rosairo  : 1980  MRN: [id number]  Referring provider: GABRIELA Vargas  Dx:   Encounter Diagnosis     ICD-10-CM    1  COVID-19 virus detected  U07 1    2  Cognitive communication deficit  R41 [id number]      Medical History significant for:   Past Medical History:   Diagnosis Date    Allergic rhinitis     COVID-19      Flowsheet:  Start Time: 0800  Stop Time: 0900  Total time in clinic (min): 60 minutes    Subjective: Pt has neurology appointment next week and will discuss options for returning back to driving  Writing notes to her daughter, reporting she will sit down and sometimes feel too stressed or overwhelemed to write the letter and will have to come back to it  I suggested taking notes of topics throughout the week to include in her letters as she thinks of them so it will help her organize her thoughts when she wants to write her letters  Pt ran out of her xanax prescription last night so she was not able to take it  She refilled it and will be picking it up today, important to note that she feels higher level of anxiety today  Objective:  1  Pt will complete high level multi-tier deductive reasoning puzzles with no more than 2 errors per puzzle  2   Patient will complete various sustained and divided attention tasks for 2+ minutes while attending to auditory/visual stimuli with task average decreasing closer to the average range   open ended picture writing @ 100% ketan no errors great content  Date Task type Task Average  SRO Early  Late  Variance     RT 3min aud open ended ques  "LT   " 176    833 11    16 54    94 58    77 743    386                                                               5   Patient will report neuropsychology appt established   scheduled end of the month for virtual and in office testing first week of    Patient will report plan toward return to drive  8/5 Pt will discuss at next neurology appointment  Long Term Goals:  1  Pt will increase cognitive linguistic skills across all settings  2  Pt will increase physical and mental endurance across all settings  Assessment: Patient visibly anxious and also reporting she felt overwhelemed if more than 2 or 3 options were provided to her today to choose from  This is happening across all enviornments  Plan:  Recommendations:  -Patient would benefit from outpatient skilled Speech Therapy services : Cognitive-Linguistic therapy   Recommend neurology consult as well as optometry    -Frequency: 2x weekly ( update 8/5: adjusted to 1x per week to transition towards discharge based on discussion today together with patient)     -Duration: 2-3 more weeks    -Intervention certification from: 5/1/6894  -Intervention certification to: 9/01/3769  -Intervention comments: puzzles, interactive metronome, word finding strategies  Visit: 28      Homework: Mind dumont B puzzle 12

## 2021-08-05 NOTE — TELEPHONE ENCOUNTER
THE [facility] for patient to [facility] and confirm appointment with Dr Yue Ortiz  Gave direct number in Vignesh Garcia

## 2021-08-09 ENCOUNTER — OFFICE VISIT (OUTPATIENT)
Dept: SPEECH THERAPY | Facility: CLINIC | Age: 41
End: 2021-08-09
Payer: COMMERCIAL

## 2021-08-09 DIAGNOSIS — U07.1 COVID-19 VIRUS DETECTED: Primary | ICD-10-CM

## 2021-08-09 PROCEDURE — 92507 TX SP LANG VOICE COMM INDIV: CPT

## 2021-08-09 NOTE — PROGRESS NOTES
Speech-Language Pathology Treatment Note    Today's date: 2021  Patient name: Nayeli Dueñas  : 1980  MRN: [id number]  Referring provider: GABRIELA Becerra  Dx:   Encounter Diagnosis     ICD-10-CM    1  COVID-19 virus detected  U07 1      Medical History significant for:   Past Medical History:   Diagnosis Date    Allergic rhinitis     COVID-19      Flowsheet:  Start Time: 0800  Stop Time: 0900  Total time in clinic (min): 60 minutes    Subjective:  Patient sees neurology tomorrow and will be asking about driving ability for local driving, she is being pressured to return to office  in person but has concerns regarding driving to work, working 8 hours and driving the 40 minute commute home  She reports a very stressful weekend including having to take care of her sisters dogs and spending a late night helping her sister grocery shop  Objective:  1  Pt will complete high level multi-tier deductive reasoning puzzles with no more than 2 errors per puzzle  2   Patient will complete various sustained and divided attention tasks for 2+ minutes while attending to auditory/visual stimuli with task average decreasing closer to the average range   open ended picture writing @ 100% ketan no errors great content  Date Task type Task Average  SRO Early  Late  Variance     RT 3min aud open ended ques  "LT   " 341    099 23    90 79    10 99    49 286    488     2m vis+aud (ignored vis)  Defining abbreviations  Spelling  " 37  37  48  66 30  31  30  17 56  56  59  69 44  44  41  31 46  47  60  75                                                  3   Patient will report neuropsychology appt established   scheduled end of the month for virtual and in office testing first week of    Patient will report plan toward return to drive   Pt will discuss at next neurology appointment  Long Term Goals:  1   Pt will increase cognitive linguistic skills across all settings  2  Pt will increase physical and mental endurance across all settings  Assessment: Patient did very well with IM machine today, demonstrating great completion of tasks when switching to both hands and ignoring visual stimuli  Plan:  Recommendations:  -Patient would benefit from outpatient skilled Speech Therapy services : Cognitive-Linguistic therapy   Recommend neurology consult as well as optometry    -Frequency: 2x weekly ( update 8/5: adjusted to 1x per week to transition towards discharge based on discussion today together with patient)     -Duration: 2-3 more weeks    -Intervention certification from: 8/6/7317  -Intervention certification to: 3/03/4512  -Intervention comments: puzzles, interactive metronome, word finding strategies  Visit: 29    Homework: Mind dumont B puzzle 12

## 2021-08-10 ENCOUNTER — OFFICE VISIT (OUTPATIENT)
Dept: NEUROLOGY | Facility: CLINIC | Age: 41
End: 2021-08-10
Payer: COMMERCIAL

## 2021-08-10 VITALS
BODY MASS INDEX: 35.97 KG/M2 | WEIGHT: 203 LBS | RESPIRATION RATE: 16 BRPM | SYSTOLIC BLOOD PRESSURE: 124 MMHG | HEART RATE: 80 BPM | HEIGHT: 63 IN | DIASTOLIC BLOOD PRESSURE: 80 MMHG | TEMPERATURE: 97.8 F

## 2021-08-10 DIAGNOSIS — U09.9 PERSISTENT NEUROLOGIC SYMPTOMS AFTER COVID-19: Primary | ICD-10-CM

## 2021-08-10 DIAGNOSIS — U09.9 COVID-19 LONG HAULER: ICD-10-CM

## 2021-08-10 DIAGNOSIS — G43.109 OCULAR MIGRAINE: ICD-10-CM

## 2021-08-10 DIAGNOSIS — R29.90 PERSISTENT NEUROLOGIC SYMPTOMS AFTER COVID-19: Primary | ICD-10-CM

## 2021-08-10 DIAGNOSIS — F41.9 ANXIETY DISORDER: ICD-10-CM

## 2021-08-10 DIAGNOSIS — R41.840 ATTENTION AND CONCENTRATION DEFICIT: ICD-10-CM

## 2021-08-10 PROCEDURE — 99215 OFFICE O/P EST HI 40 MIN: CPT | Performed by: PSYCHIATRY & NEUROLOGY

## 2021-08-10 PROCEDURE — 1036F TOBACCO NON-USER: CPT | Performed by: PSYCHIATRY & NEUROLOGY

## 2021-08-10 PROCEDURE — 3008F BODY MASS INDEX DOCD: CPT | Performed by: INTERNAL MEDICINE

## 2021-08-10 RX ORDER — SUMATRIPTAN 25 MG/1
25 TABLET, FILM COATED ORAL AS NEEDED
Qty: 15 TABLET | Refills: 2 | Status: SHIPPED | OUTPATIENT
Start: 2021-08-10 | End: 2021-12-03

## 2021-08-10 NOTE — PROGRESS NOTES
Patient ID: Jaimee Sy is a 38 y o  female  Assessment/Plan:    COVID-19 long hauler  See above for A/P    Attention and concentration deficit  See above for A/P    Persistent neurologic symptoms after COVID-19  Hossein Taylor is a 38 y o   female with a history of HTN on metoprolol, occular migraines, Hypothyroidism, and a presumptive Covid-19 infection in 12/2020 who initially presented for neurological consultation in June 2021  At the time of and since her presumptive Covid-19 infection the patient developed new neurological symptoms including cognitive difficulties, a recrudescence of her previously occular migraines that have been in remission for >10 years, development of new migraines and abnormal movements  The patient has been seeing speech therapy and OT  On examination the patient is noted to be hyperreflexic throughout her upper and lower extremities, but negative for clonus  The patient also exhibits an action tremors that in her upper extremity is positional and is induced upon flexion of fingers  The patient also exhibited an abnormal movement in her lower extremity that is induced upon crossing her leg which results in the little toe moving back and forth in abduction and adduction  Since the last visit the patient has had an unremarkable work up completed since the last visit including EEG, MRI and lab studies  She is still pending neuropsych eval which will be completed at the end of this month  Symtom wise, it appears that outside of the cognitive problems she experience it sounds like her symptoms have improved to some degree as she reports that she has not had a headache in about 2 weeks time and her tremor while still present on exam does not appear to be as significant  I believe that her cognitive issues may have an anxiety component to them but will wait for further evaluation      Plan  - Pt to obtain neuropsych eval  - Sumatriptan for migraines, a preventative is not warranted at this time due to the infrequency currently  - Pt is to follow up with PCP and/or psych for anxiety treatment  - Pt is to follow up in 3 months, following neuropsych testing        Diagnoses and all orders for this visit:    Persistent neurologic symptoms after COVID-19    Ocular migraine  -     SUMAtriptan (IMITREX) 25 mg tablet; Take 1 tablet (25 mg total) by mouth as needed for migraine for up to 1 dose You may take another dose 2 hours after the first dose  Anxiety disorder  -     Ambulatory referral to Psychiatry; Future    COVID-19 long hauler    Attention and concentration deficit           Subjective:  Hossein Taylor is a 38 y o   female with a history of HTN on metoprolol, occular migraines, Hypothyroidism, and a presumptive Covid-19 infection in 12/2020 who initially presented for neurological consultation in June 2021 for neurological symtoms that developed following her Covid-19 infection including cognitive difficulties, a recrudescence of her previously occular migraines that had been in remission for >10 years, development of new migraines and abnormal movements  Since the last visit the patient has had an unremarkable work up completed since the last visit including EEG, MRI and lab studies  She is still pending neuropsych eval which will be completed at the end of this month  The patient reports that her migraines can vary from daily to longer periods, such as currently she has not had one for a few weeks  The patient reports that adderall has helped cognitive fog  The patient and her  report that they had had to rearrange home to avoid pt going up the stairs as she had physical and emotional tiredness that made this hard  Pt reports she still cannot drive due to anxiety and sensory over load  Sept 10th has to return to work in person,   Pt reports depression due to her situation   The patient also reports that balance and coordination are an issue however the severity depends on level of tiredness    To Review (from previous visit)  Robert Guardado is a 38 y o  female with PMHx of HTN on metoprolol,  occular migraines, Hypothyroidism on levothyroxine, S/p hysterectomy and oophorectomy > 5 years ago and Covid in 12/2020  Of note the patient was not formally diagnosed via testing but has a presumptive infection given that her  tested positive and her symptomatology is consistent with diagnosis  The patient was not hospitalized, no O2, was not on steroids, antibiotics, or remdesivir, was only only on Tylenol for fever  TMax was believed to be around 103  Pt had a cough and took tusin and coricidin  The patient and her  present today for consult for multiple neurological complaints       The patient is reporting memory and cognitive issues  Specifically she initially had trouble with remembering words, dogs name, this has improved though with speech therapy, she is still having issues remembering where she placed things  Pt has recall issues with remembering during "memory" games (ie  The game where you flip over two cards over and try to remember where they pair are)  Short term memory issues  Issues with remembering why she would go somewhere and what she was doing  Symptoms first started/where noticed during initial infection      The patient is also reporting headaches  The pt reports that years ago she had occular migraines, and is treated with her metoprolol  These had been completely resolved for 10 years before they recrudesced with her covid infection  The patient reports that the occular migraines are associated with seeing a rainbow and then a loss of vision  The patient is also reporting now having other migraines, and had no prior history of these except for at the end of an occular previously but not often   The patient reports that she can now get them alone and describes them as stabbing pain, pressure, vise  like, pt reports having someone squeeze her head helps relieve the symptoms  Nausea, photophobia, and phonophobia, associated with it  Migraines are triggered by speech therapy but also would get them at other times that sound to be associated with siginificant cognitive functioning (going over a calender for example), water dark room and tylelenol and neproxin help  Symptoms first started during initial infection     Pt reports that she also has developed a tremor that first started/was noticed initially about 2-3 months after her initial infection  Pt reports the tremor is located in her toes and fingers  It will be triggered by holding on to an object, it is only present with action  She does report hand weakness as well  Has had OT for this  Does report hand swelling intermittently, but this is triggered by any activity, not just with the hand, even is triggered by walking  The patient also reports that balance and coordination are an issue  Pt also reports she is not driving right now               The following portions of the patient's history were reviewed and updated as appropriate: allergies, current medications, past family history, past medical history, past social history, past surgical history and problem list          Objective:    Blood pressure 124/80, pulse 80, temperature 97 8 °F (36 6 °C), temperature source Temporal, resp  rate 16, height 5' 3" (1 6 m), weight 92 1 kg (203 lb)  Physical Exam  Eyes:      General: Lids are normal       Extraocular Movements: Extraocular movements intact  Pupils: Pupils are equal, round, and reactive to light  Neurological:      Mental Status: She is alert  Deep Tendon Reflexes:      Reflex Scores:       Tricep reflexes are 3+ on the right side and 3+ on the left side  Bicep reflexes are 3+ on the right side and 3+ on the left side  Brachioradialis reflexes are 3+ on the right side and 3+ on the left side         Patellar reflexes are 3+ on the right side and 3+ on the left side   Psychiatric:         Speech: Speech normal          Neurological Exam  Mental Status  Alert  Speech is normal  Language is fluent with no aphasia  Attention and concentration are normal     Cranial Nerves  CN II: Visual acuity is normal  Visual fields full to confrontation  CN III, IV, VI: Extraocular movements intact bilaterally  Normal lids and orbits bilaterally  Pupils equal round and reactive to light bilaterally  CN V: Facial sensation is normal   CN VII: Full and symmetric facial movement  CN VIII: Hearing is normal   CN IX, X: Palate elevates symmetrically  Normal gag reflex  CN XI: Shoulder shrug strength is normal   CN XII: Tongue midline without atrophy or fasciculations  Motor  Normal muscle bulk throughout  No fasciculations present  Normal muscle tone  The following abnormal movements were seen: Upper extremity action tremor is induced with flexion of fingers and results in a tremor in her fingers    Strength is 5/5 in all four extremities except as noted  Sensory  Light touch is normal in upper and lower extremities  Temperature is normal in upper and lower extremities  Reflexes  Deep tendon reflexes are 2+ and symmetric except as noted  Right                      Left  Brachioradialis                    3+                         3+  Biceps                                 3+                         3+  Triceps                                3+                         3+  Patellar                                3+                         3+    Right pathological reflexes: Ankle clonus absent  Left pathological reflexes: Ankle clonus absent  Coordination  Right: Finger-to-nose normal   Left: Finger-to-nose normal     Gait  Casual gait is normal including stance, stride, and arm swing  ROS:    Review of Systems   Constitutional: Negative  Negative for appetite change and fever  HENT: Negative    Negative for hearing loss, tinnitus, trouble swallowing and voice change  Eyes: Negative  Negative for photophobia and pain  Respiratory: Negative  Negative for shortness of breath  Cardiovascular: Negative  Negative for palpitations  Gastrointestinal: Negative  Negative for nausea and vomiting  Endocrine: Negative  Negative for cold intolerance  Genitourinary: Negative  Negative for dysuria, frequency and urgency  Musculoskeletal: Negative  Negative for myalgias and neck pain  Skin: Negative  Negative for rash  Neurological: Positive for tremors and headaches  Negative for dizziness, seizures, syncope, facial asymmetry, speech difficulty, weakness, light-headedness and numbness  Hematological: Negative  Does not bruise/bleed easily  Psychiatric/Behavioral: Negative  Negative for confusion, hallucinations and sleep disturbance

## 2021-08-10 NOTE — ASSESSMENT & PLAN NOTE
Blanca Workman is a 38 y o   female with a history of HTN on metoprolol, occular migraines, Hypothyroidism, and a presumptive Covid-19 infection in 12/2020 who initially presented for neurological consultation in June 2021  At the time of and since her presumptive Covid-19 infection the patient developed new neurological symptoms including cognitive difficulties, a recrudescence of her previously occular migraines that have been in remission for >10 years, development of new migraines and abnormal movements  The patient has been seeing speech therapy and OT  On examination the patient is noted to be hyperreflexic throughout her upper and lower extremities, but negative for clonus  The patient also exhibits an action tremors that in her upper extremity is positional and is induced upon flexion of fingers  The patient also exhibited an abnormal movement in her lower extremity that is induced upon crossing her leg which results in the little toe moving back and forth in abduction and adduction  Since the last visit the patient has had an unremarkable work up completed since the last visit including EEG, MRI and lab studies  She is still pending neuropsych eval which will be completed at the end of this month  Symtom wise, it appears that outside of the cognitive problems she experience it sounds like her symptoms have improved to some degree as she reports that she has not had a headache in about 2 weeks time and her tremor while still present on exam does not appear to be as significant  I believe that her cognitive issues may have an anxiety component to them but will wait for further evaluation      Plan  - Pt to obtain neuropsych eval  - Sumatriptan for migraines, a preventative is not warranted at this time due to the infrequency currently  - Pt is to follow up with PCP and/or psych for anxiety treatment  - Pt is to follow up in 3 months, following neuropsych testing

## 2021-08-14 DIAGNOSIS — R29.90 PERSISTENT NEUROLOGIC SYMPTOMS AFTER COVID-19: ICD-10-CM

## 2021-08-14 DIAGNOSIS — U09.9 PERSISTENT NEUROLOGIC SYMPTOMS AFTER COVID-19: ICD-10-CM

## 2021-08-14 DIAGNOSIS — R41.840 ATTENTION AND CONCENTRATION DEFICIT: ICD-10-CM

## 2021-08-14 DIAGNOSIS — U09.9 COVID-19 LONG HAULER: Primary | ICD-10-CM

## 2021-08-14 RX ORDER — DEXTROAMPHETAMINE SACCHARATE, AMPHETAMINE ASPARTATE MONOHYDRATE, DEXTROAMPHETAMINE SULFATE AND AMPHETAMINE SULFATE 1.25; 1.25; 1.25; 1.25 MG/1; MG/1; MG/1; MG/1
10 CAPSULE, EXTENDED RELEASE ORAL EVERY MORNING
Qty: 60 CAPSULE | Refills: 0 | Status: SHIPPED | OUTPATIENT
Start: 2021-08-14 | End: 2021-08-29 | Stop reason: SDUPTHER

## 2021-08-14 RX ORDER — MIRTAZAPINE 15 MG/1
15 TABLET, FILM COATED ORAL
Qty: 90 TABLET | Refills: 3 | Status: SHIPPED | OUTPATIENT
Start: 2021-08-14 | End: 2022-07-29

## 2021-08-17 ENCOUNTER — OFFICE VISIT (OUTPATIENT)
Dept: SPEECH THERAPY | Facility: CLINIC | Age: 41
End: 2021-08-17
Payer: COMMERCIAL

## 2021-08-17 DIAGNOSIS — U07.1 COVID-19 VIRUS DETECTED: Primary | ICD-10-CM

## 2021-08-17 PROCEDURE — 92507 TX SP LANG VOICE COMM INDIV: CPT

## 2021-08-17 NOTE — PROGRESS NOTES
Speech-Language Pathology Treatment Note    Today's date: 2021  Patient name: Yosi Richard  : 1980  MRN: [id number]  Referring provider: GABRIELA Martinez  Dx:   Encounter Diagnosis     ICD-10-CM    1  COVID-19 virus detected  U07 1      Medical History significant for:   Past Medical History:   Diagnosis Date    Allergic rhinitis     COVID-19      Flowsheet:  Start Time: 0800  Stop Time: 0900  Total time in clinic (min): 60 minutes    Subjective:  Patient reporting that at neurology appointment they discussed return to work via driving and a neuropsych evaluation  She reports that after having taken this medication for two days she feels refreshed  Patient woke up on her own at 6:17 let the dogs out, make coffee, and gave her  a haircut  She was able to help her  with directions and engaged in conversation during the car ride  She reports that she made dinner last night with multi-tasking (Gera Mary Lou)  Objective:  1  Pt will complete high level multi-tier deductive reasoning puzzles with no more than 2 errors per puzzle  2   Patient will complete various sustained and divided attention tasks for 2+ minutes while attending to auditory/visual stimuli with task average decreasing closer to the average range    Open ended picture writing @ 100% ketan no errors great content  Date Task type Task Average  SRO Early  Late  Variance     RT 3min aud open ended ques  "LT   " 004    994 94    71 82    10 01    17 055    813     2m vis+aud (ignored vis)  Defining abbreviations  Spelling  " 49  06  93  43 73  62  20  59 98  02  41  46 49  54  33  95 08  22  35  51     3m vis+aud  Homographs  " 45  68  73 17  10  9 69  82  73 31  18  27 51  61  76                                         3   Patient will report neuropsychology appt established   scheduled end of the month for virtual and in office testing first week of    Patient will report plan toward return to drive  8/5 Pt will discuss at next neurology appointment  Long Term Goals:  1  Pt will increase cognitive linguistic skills across all settings  2  Pt will increase physical and mental endurance across all settings  Assessment: Patient did very well with IM machine today, increased complexity introduced  New medication may serve as a means to cope with symptoms  Patient is pending discharge  Plan:  Recommendations:  -Patient would benefit from outpatient skilled Speech Therapy services : Cognitive-Linguistic therapy   Recommend neurology consult as well as optometry    -Frequency: 2x weekly ( update 8/5: adjusted to 1x per week to transition towards discharge based on discussion today together with patient)     -Duration: 2-3 more weeks    -Intervention certification from: 5/3/7373  -Intervention certification to: 6/17/8704  -Intervention comments: puzzles, interactive metronome, word finding strategies    Visit: 31    Homework: Prince HESS puzzle 12

## 2021-08-25 ENCOUNTER — TELEMEDICINE (OUTPATIENT)
Dept: NEUROLOGY | Facility: CLINIC | Age: 41
End: 2021-08-25
Payer: COMMERCIAL

## 2021-08-25 DIAGNOSIS — R41.840 ATTENTION AND CONCENTRATION DEFICIT: Primary | ICD-10-CM

## 2021-08-25 DIAGNOSIS — E03.8 OTHER SPECIFIED HYPOTHYROIDISM: ICD-10-CM

## 2021-08-25 DIAGNOSIS — F41.1 GENERALIZED ANXIETY DISORDER: ICD-10-CM

## 2021-08-25 DIAGNOSIS — R29.90 PERSISTENT NEUROLOGIC SYMPTOMS AFTER COVID-19: ICD-10-CM

## 2021-08-25 DIAGNOSIS — I10 ESSENTIAL HYPERTENSION: ICD-10-CM

## 2021-08-25 DIAGNOSIS — U09.9 COVID-19 LONG HAULER: ICD-10-CM

## 2021-08-25 DIAGNOSIS — U09.9 PERSISTENT NEUROLOGIC SYMPTOMS AFTER COVID-19: ICD-10-CM

## 2021-08-25 PROCEDURE — 96121 NUBHVL XM PHY/QHP EA ADDL HR: CPT | Performed by: CLINICAL NEUROPSYCHOLOGIST

## 2021-08-25 PROCEDURE — 96116 NUBHVL XM PHYS/QHP 1ST HR: CPT | Performed by: CLINICAL NEUROPSYCHOLOGIST

## 2021-08-26 ENCOUNTER — OFFICE VISIT (OUTPATIENT)
Dept: SPEECH THERAPY | Facility: CLINIC | Age: 41
End: 2021-08-26
Payer: COMMERCIAL

## 2021-08-26 DIAGNOSIS — R41.841 COGNITIVE COMMUNICATION DEFICIT: ICD-10-CM

## 2021-08-26 DIAGNOSIS — U07.1 COVID-19 VIRUS DETECTED: Primary | ICD-10-CM

## 2021-08-26 PROCEDURE — 92507 TX SP LANG VOICE COMM INDIV: CPT | Performed by: NURSE PRACTITIONER

## 2021-08-26 NOTE — PROGRESS NOTES
Speech and Language Therapy Discharge Report    Patient Name: Charlee Goodpasture   VFTQB'F Date: 08/26/21         Most Recent Assessment:  The Repeatable Battery for the Assessment of Neuropsychological Status (RBANS) is a brief, individually-administered assessment which measures attention, language, visuospatial/constructional abilities, and immediate & delayed memory  The RBANS is intended for use with adolescents to adults, ages 15 to 80 years  The following results were obtained during the administration of the assessment  Comparison: form D administered 4/14/21 and form A administered 6/22/21    Cognitive Domain/Subtest: Index Score: Percentile Rank: Classification: IE: Status:   IMMEDIATE MEMORY 120 91%ile Superior 103 IMPROVEMENT        1  List Learning (31/40)          2  Story Memory (23/24)           VISUOSPATIAL/  CONSTRUCTIONAL 126 96%ile Superior 116 IMPROVEMENT        3  Figure Copy (20/20)          4  Line Orientation (20/20)           LANGUAGE 103 58%ile Average 95 IMPROVEMENT        5  Picture Naming (10/10)          6  Semantic Fluency (24/40)           ATTENTION 115 84%ile High Average 72 IMPROVEMENT        7  Digit Span (14/16)          8  Coding (54/89)           DELAYED MEMORY 118 88%ile High Average 95 IMPROVEMENT        9  List Recall (8/10)          10  List Recognition (20/20)          11  Story Recall (12/12)          12  Figure Recall (17/20)         Sum of Index Scores:  582  481 IMPROVEMENT   Total Score:  124      Percentile: 95%ile      Classification: Superior            Short Term Goals at the Time of Discharge:  1  Pt will complete high level multi-tier deductive reasoning puzzles with no more than 2 errors per puzzle  Goal partially met, continue HEP    2  Patient will complete various sustained and divided attention tasks for 2+ minutes while attending to auditory/visual stimuli with task average decreasing closer to the average range  Goal partially met, continue HEP    3  Patient will report neuropsychology appt established  8/5 scheduled end of the month for virtual and in office testing first week of September  GOAL MET SCHEDULED NEXT Tuesday 4   Patient will report plan toward return to drive  GOAL MET     Long Term Goals:  1  Pt will increase cognitive linguistic skills across all settings  2  Pt will increase physical and mental endurance across all settings  Assessment:  neuropsych virtual appointment yesterday, and full evaluation will be completed on Tuesday  Neurology appointment 8/10/21, started mirtazapine and discontinued xanax  If she is more tired, increased agitation and increased lability  Pt reports no follow-up needed at neurology  Drove 2 days ago for the first time to work and home ( total of 1 hour of driving)  Symptoms of feeling of exhausted following driving and required a nap for 1 5 hours  Able to get one note out to her daughter in the mail for the [place]  Difficulty addressing an envelope had to fix ~5x  Making choices with more than two options is very overwhelming for her, eye twitch and migraine start  September 10th patient will be returning back to work in person  Patient is s/p EEG, MRI brain, CT chest, PFT ( Pt reports all results came back normal)   Participation in Treatment (at discharge):   Cooperative    Patient is discharged to [address] [place] [address] name/phone number for follow up: [phone]

## 2021-08-29 ENCOUNTER — NURSE TRIAGE (OUTPATIENT)
Dept: OTHER | Facility: OTHER | Age: 41
End: 2021-08-29

## 2021-08-29 NOTE — TELEPHONE ENCOUNTER
Reason for Disposition   [1] Caller has URGENT medicine question about med that PCP or specialist prescribed AND [2] triager unable to answer question    Answer Assessment - Initial Assessment Questions  1  NAME of MEDICATION: "What medicine are you calling about?"      Adderall 10 mg PO daily    2  QUESTION: "What is your question?" (e g , medication refill, side effect)      All out of and pharmacy closed early    3  PRESCRIBING HCP: "Who prescribed it?" Reason: if prescribed by specialist, call should be referred to that group        Checo Galdamez    Protocols used: MEDICATION QUESTION CALL-ADULT-

## 2021-08-29 NOTE — TELEPHONE ENCOUNTER
Regarding: Completely out of medication  ----- Message from López Arias sent at 8/29/2021  4:10 PM EDT -----  "I went to  my medication at the pharmacy, and the office called it into the wrong pharmacy  The pharmacy they sent it to, randomly closed early at noon today    I am completely out of my Adderall "

## 2021-08-30 DIAGNOSIS — U09.9 COVID-19 LONG HAULER: ICD-10-CM

## 2021-08-30 DIAGNOSIS — R29.90 PERSISTENT NEUROLOGIC SYMPTOMS AFTER COVID-19: ICD-10-CM

## 2021-08-30 DIAGNOSIS — R41.840 ATTENTION AND CONCENTRATION DEFICIT: ICD-10-CM

## 2021-08-30 DIAGNOSIS — U09.9 PERSISTENT NEUROLOGIC SYMPTOMS AFTER COVID-19: ICD-10-CM

## 2021-08-30 RX ORDER — DEXTROAMPHETAMINE SACCHARATE, AMPHETAMINE ASPARTATE MONOHYDRATE, DEXTROAMPHETAMINE SULFATE AND AMPHETAMINE SULFATE 1.25; 1.25; 1.25; 1.25 MG/1; MG/1; MG/1; MG/1
10 CAPSULE, EXTENDED RELEASE ORAL EVERY MORNING
Qty: 60 CAPSULE | Refills: 0 | Status: SHIPPED | OUTPATIENT
Start: 2021-08-30 | End: 2021-10-12 | Stop reason: SDUPTHER

## 2021-08-30 NOTE — TELEPHONE ENCOUNTER
----- Message from Hector Mosquera  Fide Arnett sent at 8/29/2021  3:49 PM EDT -----  Regarding: RE: medicatons  Contact: [phone]  Hi,  Sorry for the last minute nature, but I had requested [facility] and the [facility] called them in to [facility]  I am out of adderall and was going to pick it up, but they shut down that store and won't transfer anything  Can u please call it in to another pharmacy? [facility] on [address] in [place] would be very helpful  I'm very frustrated  I've been on hold for over an hour for them to tell me they refused to fill it  Thanks,  Yohannes Letters    ----- Message -----  From: GABRIELA Madrigal  Sent: 8/14/21, 5:51 PM  To: Brionna Sampson  Subject: medicatons    I sent the prescription for the remeron, and I increased the adderall to 10mg a day (you can just take 2 of the 5mg at one time or even split it and take it twice a day)  I am thinking that you may want to keep the xanax on hand - do not have a withdrawal situation  Let me know if it works or if we need to increase things again

## 2021-09-23 NOTE — PROGRESS NOTES
Virtual Regular Visit     Encounter provider Sobia Resendiz PSYD     Provider located at   [address] [place] [address]    Name:    Gabriela Walsh  YOB: 1980  Age:    42  Date of Service:  8/25/2021 (Interview by video)  Referred By:   Lindsay Victoria DO    The patient was identified by name and date of birth  She was informed that this is a telemedicine visit and that the visit is being conducted through [address] [place] [address] Road  My office door was closed  No one else was in the room  She acknowledged consent and understanding of privacy and security of the video platform  The patient has agreed to participate and understands they can discontinue the visit at any time  Patient is aware this is a billable service  REASON FOR REFERRAL  Gabriela Walsh is a 42 y o  right-handed  female referred by Dr Minta Scheuermann for a neuropsychological evaluation of cognitive difficulty s/p COVID in December 2020  CHIEF COMPLAINT  Patient detailed symptoms related to COVID infection in December 2020 with continued difficulty with shortness of breath, coughing, and dizziness affecting her functional abilities  She has completed PT, OT, and is expected to be discharged from [address] [place] [address] tomorrow  In regard to cognitive difficulty, she described that her initial fevers wiped [her] whole brain   She returned to work one week later and had difficulty remembering the amount of a check between the time she looked at the check and then at her computer  There were days she did not recall the drive to work  She described marked exhaustion at the start of her day  Patient acknowledged that she has learned compensatory mechanisms through ST, but continues to have concerns, especially about cognitive fatigue  She is now prescribed Adderall, without which she cant put things in sequential order   Word-finding was a real issue until recently      Ms  Elenasuman Miranda described marked changes in her independent functioning  Previously, she was very capable with cooking, schedule management, logistics, and transportation, and now needs to depend on others for every detail   Driving was Road [employer]   When she started with speech therapy sessions, she recognized how bad this was and ceased driving, but then finding rides became stressful  She denied recent car accidents or traffic tickets  There was a time when she was out with her daughter locally and had no concept of how to navigate back home and required GPS  She reported anxiety about being in the vehicle, feeling like everything was moving fast; with Esther, she Baxter Yolande being in the car  She had to refresh [her] memory on the cars operations and is feeling somewhat more comfortable  She drove 30 minutes yesterday  Patient was previously very organized about meal preparation and shopping; she would plan menus for two weeks and have her shopping list organized by store and specific aisles  She now has no idea what to make or how to make it   She is too fatigued   She had to relearn how to create lists in speech therapy  Patient and  now review recipe books together, shop together, and eat out more frequently  Patient has burnt food while cooking, which was never an issue previously  She finds that she cant keep track of whats on the stove and in the oven; her timing is off  With operating appliances, she needs to be completely focused, otherwise will make basic mistakes   She has not noticed substantial changes with operating technology  Patient previously managed her mother-in-law and daughters finances in addition to her own  She was very organized and utilized spreadsheets  She stated, I cant handle doing what I used to   Her  has transferred this information to [employer] and has taken over management   Patient described herself as hyperfocused on finances previously and now has no idea if bills have been paid  Patient is typically organized with medication management  She once accidentally took Adderall before bedtime  Her concept of time is reportedly not as accurate  She recently declined an invitation for 9/3 as she thought the date had passed  She has tried various calendars, but has not found a system that works  She has difficulty planning more than a few days in advance  Llana Shield appointment reminders are helpful and she passes the information along to her   Past Medical History:   Diagnosis Date    Allergic rhinitis     COVID-19            Current Outpatient Medications:     ALPRAZolam (XANAX) 0 25 mg tablet, Take 1 tablet (0 25 mg total) by mouth 2 (two) times a day as needed for anxiety, Disp: 60 tablet, Rfl: 0    amphetamine-dextroamphetamine (ADDERALL XR) 5 MG 24 hr capsule, Take 2 capsules (10 mg total) by mouth every morningMax Daily Amount: 10 mg, Disp: 60 capsule, Rfl: 0    Ascorbic Acid (vitamin C) 1000 MG tablet, Take 1,000 mg by mouth daily, Disp: , Rfl:     levothyroxine 100 mcg tablet, Take 1 tablet (100 mcg total) by mouth daily, Disp: 90 tablet, Rfl: 1    metoprolol succinate (TOPROL-XL) 50 mg 24 hr tablet, Take 1 tablet (50 mg total) by mouth daily, Disp: 90 tablet, Rfl: 3    mirtazapine (REMERON) 15 mg tablet, Take 1 tablet (15 mg total) by mouth daily at bedtime, Disp: 90 tablet, Rfl: 3    Multiple Vitamins-Minerals (MULTIVITAL PO), Take 1 capsule by mouth daily  , Disp: , Rfl:     SUMAtriptan (IMITREX) 25 mg tablet, Take 1 tablet (25 mg total) by mouth as needed for migraine for up to 1 dose You may take another dose 2 hours after the first dose , Disp: 15 tablet, Rfl: 2    zinc sulfate (ZINCATE) 220 mg capsule, Take 220 mg by mouth daily, Disp: , Rfl:       NEUROIMAGING  MRI 6/21/21  Normal examination      PSYCHOSOCIAL HISTORY  Developmental/Educational history:  Ms Fredy Casetllanos was born full-term without any complications in the pregnancy or delivery  Developmental milestones were met within normal limits  She was born in [place], [place], and raised in [place], [place] from age 2  She obtained very good grades in [email or web address]  She excelled at language ([place], [place]  She denied any learning difficulties  She attended two years of college, majoring in [facility] [employer] [facility] [place]  Occupational history:  Patient works in a pharmacy office processing checks from [employer]  It is highly dependent on math skills and attention to detail  She has been in this position for 1 5 years  She has been working from home since March 2021 and is expected to return to the office 9/10  Patient worked as a  prior to this position  Social history:  Patient is  to her  of 21 years  They have two daughters, one in [address] [place] [address] Road and one living at home  Patients mother-in-law also resides in the home  Patients parents are 76years old; her mother is progressively declining with muscular dystrophy  Patient has a sister, two years her senior  Family history is significant for dementia (unspecified type) in maternal grandmother  Patient named her  as her primary social support  When asked about stressors, she stated she has so many, and in general is frustrated that she cant do what [she] used to do   Patient has a craft room, but currently has difficulty putting things together and her brain starts to go if she initiates a craft  She enjoys camping  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, but socializing at events has become difficult for the patient as she feels she cant participate in conversation - she struggles for words, and the conversation has moved on by the time she thinks of what to respond  Psychiatric history:  Patient denied a significant mental health history  She was prescribed Zoloft 15 years ago for PMDD  She denied a history of counseling or psychiatric hospitalization      When asked about current mood, she stated, it fluctuatesfor the most part Im okay   She described a connection between her mood and fatigue, getting more grumpy as she gets more tired, and ragey by the end of the day, especially this past week  She stated that she is obviously frustrated and that she finds her current situation somewhat depressing   She denied overt depression, though, and explained that its more frustration  She denied apathy; she has lots of ideas of what [she] want[s] to work on, but finds that even simple tasks require so much effort  She acknowledged anxiety thats been there throughout, especially with driving, socializing, and with how fatigue will impact her responsibilities  Patient was prescribed Xanax for sleep, but is now on Remeron instead  Her  agreed that she is angering more easily and wonders whether this is related to her medication  Patient denied manic or psychotic symptoms  She does experience COVID smells (e g , smelling fire or popcorn)  Patient denied current or past SI/HI  Patient goes to bed around 11 PM  With Xanax, she was out within a half hour and slept calmly  At first with Remeron, she felt powered down, but now it is not as easy to fall asleep, she is asleep by 12:30  She finds herself awakening every hour thinking she needs to take care of something and needing to remind herself its the middle of the night  Her sleep is, therefore, choppy   Her alarm goes off at 6:30/7:00 AM, but she may not get out of bed until 8:55 and is then rushing through her morning routine  Her  noted that since initiating Remeron, she is making noises during the night  Patient describes substantial daytime fatigue  She has gained 30 pounds during COVID (from 170 to 200 pounds)  She described foods tasting different and needing intense flavor to taste food  She engaged in olfactory retraining with OT      Alcohol/Drug use history:  Patient consumes a glass of wine about 1-2 days/week to relax   She denied a history of excessive use  She smoked 1-2 cigarettes/day in college  She denied use of illicit substances  BEHAVIORAL OBSERVATIONS  During the interview, Ms Neal Leija appeared appropriately groomed and dressed  She was alert and fully oriented  Her , Ivonne Pretty, participated in the consult  Affect was generally restricted, with slight tearfulness appropriate to session content  Thought process was goal-directed  Speech was fluent; language expression was effective  PLAN  Patient cancelled 8/31/21 testing due to illness  She is rescheduled for 9/28/21  Report to follow  Bertha Almaraz Neuropsychologist    VIRTUAL VISIT DISCLAIMER     Andres Zurita acknowledges that she has consented to an online visit or consultation  She understands that the online visit is based solely on information provided by her, and that, in the absence of a face-to-face physical evaluation by the physician, the diagnosis she receives is both limited and provisional in terms of accuracy and completeness  This is not intended to replace a full medical face-to-face evaluation by the physician  Raisafarhad Parminder understands and accepts these terms      Billing notes:  85575: 1 unit; 35566: 1 unit (Interview 133 min) *including 82 min virtual

## 2021-10-05 DIAGNOSIS — U09.9 PERSISTENT NEUROLOGIC SYMPTOMS AFTER COVID-19: ICD-10-CM

## 2021-10-05 DIAGNOSIS — R29.90 PERSISTENT NEUROLOGIC SYMPTOMS AFTER COVID-19: ICD-10-CM

## 2021-10-05 DIAGNOSIS — R41.840 ATTENTION AND CONCENTRATION DEFICIT: ICD-10-CM

## 2021-10-05 DIAGNOSIS — U09.9 COVID-19 LONG HAULER: ICD-10-CM

## 2021-10-12 DIAGNOSIS — U09.9 COVID-19 LONG HAULER: ICD-10-CM

## 2021-10-12 DIAGNOSIS — R41.840 ATTENTION AND CONCENTRATION DEFICIT: ICD-10-CM

## 2021-10-12 DIAGNOSIS — U09.9 PERSISTENT NEUROLOGIC SYMPTOMS AFTER COVID-19: ICD-10-CM

## 2021-10-12 DIAGNOSIS — R29.90 PERSISTENT NEUROLOGIC SYMPTOMS AFTER COVID-19: ICD-10-CM

## 2021-10-12 DIAGNOSIS — R41.840 ATTENTION AND CONCENTRATION DEFICIT: Primary | ICD-10-CM

## 2021-10-12 RX ORDER — DEXTROAMPHETAMINE SACCHARATE, AMPHETAMINE ASPARTATE MONOHYDRATE, DEXTROAMPHETAMINE SULFATE AND AMPHETAMINE SULFATE 1.25; 1.25; 1.25; 1.25 MG/1; MG/1; MG/1; MG/1
10 CAPSULE, EXTENDED RELEASE ORAL EVERY MORNING
Qty: 60 CAPSULE | Refills: 0 | Status: SHIPPED | OUTPATIENT
Start: 2021-10-12 | End: 2021-11-12 | Stop reason: SDUPTHER

## 2021-10-12 RX ORDER — DEXTROAMPHETAMINE SACCHARATE, AMPHETAMINE ASPARTATE, DEXTROAMPHETAMINE SULFATE AND AMPHETAMINE SULFATE 1.25; 1.25; 1.25; 1.25 MG/1; MG/1; MG/1; MG/1
5 TABLET ORAL EVERY EVENING
Qty: 30 TABLET | Refills: 0 | Status: SHIPPED | OUTPATIENT
Start: 2021-10-12 | End: 2021-11-12 | Stop reason: SDUPTHER

## 2021-10-12 RX ORDER — DEXTROAMPHETAMINE SACCHARATE, AMPHETAMINE ASPARTATE MONOHYDRATE, DEXTROAMPHETAMINE SULFATE AND AMPHETAMINE SULFATE 1.25; 1.25; 1.25; 1.25 MG/1; MG/1; MG/1; MG/1
10 CAPSULE, EXTENDED RELEASE ORAL EVERY MORNING
Qty: 60 CAPSULE | Refills: 0 | Status: SHIPPED | OUTPATIENT
Start: 2021-10-12 | End: 2021-10-12 | Stop reason: SDUPTHER

## 2021-10-18 RX ORDER — DEXTROAMPHETAMINE SACCHARATE, AMPHETAMINE ASPARTATE MONOHYDRATE, DEXTROAMPHETAMINE SULFATE AND AMPHETAMINE SULFATE 1.25; 1.25; 1.25; 1.25 MG/1; MG/1; MG/1; MG/1
10 CAPSULE, EXTENDED RELEASE ORAL EVERY MORNING
Qty: 60 CAPSULE | Refills: 0 | Status: SHIPPED | OUTPATIENT
Start: 2021-10-18 | End: 2021-11-23

## 2021-11-08 ENCOUNTER — TELEPHONE (OUTPATIENT)
Dept: NEUROLOGY | Facility: CLINIC | Age: 41
End: 2021-11-08

## 2021-11-12 ENCOUNTER — TELEPHONE (OUTPATIENT)
Dept: NEUROLOGY | Facility: CLINIC | Age: 41
End: 2021-11-12

## 2021-11-12 DIAGNOSIS — U09.9 PERSISTENT NEUROLOGIC SYMPTOMS AFTER COVID-19: ICD-10-CM

## 2021-11-12 DIAGNOSIS — U09.9 COVID-19 LONG HAULER: ICD-10-CM

## 2021-11-12 DIAGNOSIS — R41.840 ATTENTION AND CONCENTRATION DEFICIT: ICD-10-CM

## 2021-11-12 DIAGNOSIS — R29.90 PERSISTENT NEUROLOGIC SYMPTOMS AFTER COVID-19: ICD-10-CM

## 2021-11-12 DIAGNOSIS — U07.1 COVID-19 VIRUS INFECTION: ICD-10-CM

## 2021-11-12 RX ORDER — ALPRAZOLAM 0.25 MG/1
0.25 TABLET ORAL 2 TIMES DAILY PRN
Qty: 60 TABLET | Refills: 0 | Status: SHIPPED | OUTPATIENT
Start: 2021-11-12 | End: 2021-12-12

## 2021-11-12 RX ORDER — DEXTROAMPHETAMINE SACCHARATE, AMPHETAMINE ASPARTATE, DEXTROAMPHETAMINE SULFATE AND AMPHETAMINE SULFATE 1.25; 1.25; 1.25; 1.25 MG/1; MG/1; MG/1; MG/1
5 TABLET ORAL EVERY EVENING
Qty: 30 TABLET | Refills: 0 | Status: SHIPPED | OUTPATIENT
Start: 2021-11-12 | End: 2021-11-23 | Stop reason: SDUPTHER

## 2021-11-12 RX ORDER — DEXTROAMPHETAMINE SACCHARATE, AMPHETAMINE ASPARTATE MONOHYDRATE, DEXTROAMPHETAMINE SULFATE AND AMPHETAMINE SULFATE 1.25; 1.25; 1.25; 1.25 MG/1; MG/1; MG/1; MG/1
10 CAPSULE, EXTENDED RELEASE ORAL EVERY MORNING
Qty: 60 CAPSULE | Refills: 0 | Status: SHIPPED | OUTPATIENT
Start: 2021-11-12 | End: 2021-11-23 | Stop reason: SDUPTHER

## 2021-11-23 ENCOUNTER — RA CDI HCC (OUTPATIENT)
Dept: OTHER | Facility: HOSPITAL | Age: 41
End: 2021-11-23

## 2021-11-23 DIAGNOSIS — R29.90 PERSISTENT NEUROLOGIC SYMPTOMS AFTER COVID-19: ICD-10-CM

## 2021-11-23 DIAGNOSIS — U09.9 PERSISTENT NEUROLOGIC SYMPTOMS AFTER COVID-19: ICD-10-CM

## 2021-11-23 DIAGNOSIS — U09.9 COVID-19 LONG HAULER: ICD-10-CM

## 2021-11-23 DIAGNOSIS — R41.840 ATTENTION AND CONCENTRATION DEFICIT: ICD-10-CM

## 2021-11-23 RX ORDER — DEXTROAMPHETAMINE SACCHARATE, AMPHETAMINE ASPARTATE MONOHYDRATE, DEXTROAMPHETAMINE SULFATE AND AMPHETAMINE SULFATE 1.25; 1.25; 1.25; 1.25 MG/1; MG/1; MG/1; MG/1
10 CAPSULE, EXTENDED RELEASE ORAL EVERY MORNING
Qty: 60 CAPSULE | Refills: 0 | Status: SHIPPED | OUTPATIENT
Start: 2021-11-23 | End: 2021-12-08 | Stop reason: SDUPTHER

## 2021-11-23 RX ORDER — DEXTROAMPHETAMINE SACCHARATE, AMPHETAMINE ASPARTATE, DEXTROAMPHETAMINE SULFATE AND AMPHETAMINE SULFATE 1.25; 1.25; 1.25; 1.25 MG/1; MG/1; MG/1; MG/1
5 TABLET ORAL EVERY EVENING
Qty: 30 TABLET | Refills: 0 | Status: SHIPPED | OUTPATIENT
Start: 2021-11-23 | End: 2021-12-08 | Stop reason: SDUPTHER

## 2021-12-03 ENCOUNTER — OFFICE VISIT (OUTPATIENT)
Dept: INTERNAL MEDICINE CLINIC | Facility: CLINIC | Age: 41
End: 2021-12-03
Payer: COMMERCIAL

## 2021-12-03 VITALS
WEIGHT: 206 LBS | RESPIRATION RATE: 14 BRPM | BODY MASS INDEX: 36.5 KG/M2 | HEART RATE: 77 BPM | DIASTOLIC BLOOD PRESSURE: 88 MMHG | OXYGEN SATURATION: 96 % | HEIGHT: 63 IN | TEMPERATURE: 96.8 F | SYSTOLIC BLOOD PRESSURE: 120 MMHG

## 2021-12-03 DIAGNOSIS — R00.1: ICD-10-CM

## 2021-12-03 DIAGNOSIS — E66.09 CLASS 2 OBESITY DUE TO EXCESS CALORIES WITHOUT SERIOUS COMORBIDITY WITH BODY MASS INDEX (BMI) OF 36.0 TO 36.9 IN ADULT: ICD-10-CM

## 2021-12-03 DIAGNOSIS — R29.90 PERSISTENT NEUROLOGIC SYMPTOMS AFTER COVID-19: ICD-10-CM

## 2021-12-03 DIAGNOSIS — U09.9 PERSISTENT NEUROLOGIC SYMPTOMS AFTER COVID-19: ICD-10-CM

## 2021-12-03 DIAGNOSIS — Z86.16 PERSONAL HISTORY OF COVID-19: ICD-10-CM

## 2021-12-03 DIAGNOSIS — F41.1 GENERALIZED ANXIETY DISORDER: ICD-10-CM

## 2021-12-03 DIAGNOSIS — U09.9 COVID-19 LONG HAULER: ICD-10-CM

## 2021-12-03 DIAGNOSIS — Z00.00 ANNUAL PHYSICAL EXAM: Primary | ICD-10-CM

## 2021-12-03 DIAGNOSIS — E03.8 OTHER SPECIFIED HYPOTHYROIDISM: ICD-10-CM

## 2021-12-03 DIAGNOSIS — I10 ESSENTIAL HYPERTENSION: ICD-10-CM

## 2021-12-03 DIAGNOSIS — R41.840 ATTENTION AND CONCENTRATION DEFICIT: ICD-10-CM

## 2021-12-03 DIAGNOSIS — Z12.31 BREAST CANCER SCREENING BY MAMMOGRAM: ICD-10-CM

## 2021-12-03 PROCEDURE — 99396 PREV VISIT EST AGE 40-64: CPT | Performed by: NURSE PRACTITIONER

## 2021-12-03 PROCEDURE — 3008F BODY MASS INDEX DOCD: CPT | Performed by: NURSE PRACTITIONER

## 2021-12-03 PROCEDURE — 1036F TOBACCO NON-USER: CPT | Performed by: NURSE PRACTITIONER

## 2021-12-03 PROCEDURE — 3725F SCREEN DEPRESSION PERFORMED: CPT | Performed by: NURSE PRACTITIONER

## 2021-12-08 DIAGNOSIS — R41.840 ATTENTION AND CONCENTRATION DEFICIT: ICD-10-CM

## 2021-12-08 DIAGNOSIS — U09.9 PERSISTENT NEUROLOGIC SYMPTOMS AFTER COVID-19: ICD-10-CM

## 2021-12-08 DIAGNOSIS — U09.9 COVID-19 LONG HAULER: ICD-10-CM

## 2021-12-08 DIAGNOSIS — R29.90 PERSISTENT NEUROLOGIC SYMPTOMS AFTER COVID-19: ICD-10-CM

## 2021-12-08 RX ORDER — DEXTROAMPHETAMINE SACCHARATE, AMPHETAMINE ASPARTATE, DEXTROAMPHETAMINE SULFATE AND AMPHETAMINE SULFATE 1.25; 1.25; 1.25; 1.25 MG/1; MG/1; MG/1; MG/1
5 TABLET ORAL EVERY EVENING
Qty: 30 TABLET | Refills: 0 | Status: SHIPPED | OUTPATIENT
Start: 2021-12-08 | End: 2022-01-10 | Stop reason: SDUPTHER

## 2021-12-08 RX ORDER — DEXTROAMPHETAMINE SACCHARATE, AMPHETAMINE ASPARTATE MONOHYDRATE, DEXTROAMPHETAMINE SULFATE AND AMPHETAMINE SULFATE 1.25; 1.25; 1.25; 1.25 MG/1; MG/1; MG/1; MG/1
10 CAPSULE, EXTENDED RELEASE ORAL EVERY MORNING
Qty: 60 CAPSULE | Refills: 0 | Status: SHIPPED | OUTPATIENT
Start: 2021-12-08 | End: 2022-01-10 | Stop reason: SDUPTHER

## 2021-12-16 ENCOUNTER — HOSPITAL ENCOUNTER (OUTPATIENT)
Dept: MAMMOGRAPHY | Facility: HOSPITAL | Age: 41
Discharge: HOME | End: 2021-12-16
Payer: COMMERCIAL

## 2021-12-16 VITALS — HEIGHT: 63 IN | WEIGHT: 206 LBS | BODY MASS INDEX: 36.5 KG/M2

## 2021-12-16 DIAGNOSIS — Z12.31 BREAST CANCER SCREENING BY MAMMOGRAM: ICD-10-CM

## 2021-12-16 PROCEDURE — 77067 SCR MAMMO BI INCL CAD: CPT

## 2021-12-16 PROCEDURE — 77063 BREAST TOMOSYNTHESIS BI: CPT

## 2022-01-10 DIAGNOSIS — R29.90 PERSISTENT NEUROLOGIC SYMPTOMS AFTER COVID-19: ICD-10-CM

## 2022-01-10 DIAGNOSIS — U09.9 COVID-19 LONG HAULER: ICD-10-CM

## 2022-01-10 DIAGNOSIS — U09.9 PERSISTENT NEUROLOGIC SYMPTOMS AFTER COVID-19: ICD-10-CM

## 2022-01-10 DIAGNOSIS — R41.840 ATTENTION AND CONCENTRATION DEFICIT: ICD-10-CM

## 2022-01-10 RX ORDER — DEXTROAMPHETAMINE SACCHARATE, AMPHETAMINE ASPARTATE, DEXTROAMPHETAMINE SULFATE AND AMPHETAMINE SULFATE 1.25; 1.25; 1.25; 1.25 MG/1; MG/1; MG/1; MG/1
5 TABLET ORAL EVERY EVENING
Qty: 30 TABLET | Refills: 0 | Status: SHIPPED | OUTPATIENT
Start: 2022-01-10 | End: 2022-02-10 | Stop reason: SDUPTHER

## 2022-01-10 RX ORDER — DEXTROAMPHETAMINE SACCHARATE, AMPHETAMINE ASPARTATE MONOHYDRATE, DEXTROAMPHETAMINE SULFATE AND AMPHETAMINE SULFATE 1.25; 1.25; 1.25; 1.25 MG/1; MG/1; MG/1; MG/1
10 CAPSULE, EXTENDED RELEASE ORAL EVERY MORNING
Qty: 60 CAPSULE | Refills: 0 | Status: SHIPPED | OUTPATIENT
Start: 2022-01-10 | End: 2022-02-10 | Stop reason: SDUPTHER

## 2022-01-27 DIAGNOSIS — E07.9 THYROID DISEASE: ICD-10-CM

## 2022-01-27 RX ORDER — LEVOTHYROXINE SODIUM 0.1 MG/1
TABLET ORAL
Qty: 90 TABLET | Refills: 1 | Status: SHIPPED | OUTPATIENT
Start: 2022-01-27 | End: 2022-07-29

## 2022-02-10 DIAGNOSIS — R41.840 ATTENTION AND CONCENTRATION DEFICIT: ICD-10-CM

## 2022-02-10 DIAGNOSIS — U09.9 COVID-19 LONG HAULER: ICD-10-CM

## 2022-02-10 DIAGNOSIS — R29.90 PERSISTENT NEUROLOGIC SYMPTOMS AFTER COVID-19: ICD-10-CM

## 2022-02-10 DIAGNOSIS — U09.9 PERSISTENT NEUROLOGIC SYMPTOMS AFTER COVID-19: ICD-10-CM

## 2022-02-11 RX ORDER — DEXTROAMPHETAMINE SACCHARATE, AMPHETAMINE ASPARTATE, DEXTROAMPHETAMINE SULFATE AND AMPHETAMINE SULFATE 1.25; 1.25; 1.25; 1.25 MG/1; MG/1; MG/1; MG/1
5 TABLET ORAL EVERY EVENING
Qty: 30 TABLET | Refills: 0 | Status: SHIPPED | OUTPATIENT
Start: 2022-02-11 | End: 2022-03-11 | Stop reason: SDUPTHER

## 2022-02-11 RX ORDER — DEXTROAMPHETAMINE SACCHARATE, AMPHETAMINE ASPARTATE MONOHYDRATE, DEXTROAMPHETAMINE SULFATE AND AMPHETAMINE SULFATE 1.25; 1.25; 1.25; 1.25 MG/1; MG/1; MG/1; MG/1
10 CAPSULE, EXTENDED RELEASE ORAL EVERY MORNING
Qty: 60 CAPSULE | Refills: 0 | Status: SHIPPED | OUTPATIENT
Start: 2022-02-11 | End: 2022-03-11 | Stop reason: SDUPTHER

## 2022-03-11 DIAGNOSIS — R41.840 ATTENTION AND CONCENTRATION DEFICIT: ICD-10-CM

## 2022-03-11 DIAGNOSIS — R29.90 PERSISTENT NEUROLOGIC SYMPTOMS AFTER COVID-19: ICD-10-CM

## 2022-03-11 DIAGNOSIS — U09.9 COVID-19 LONG HAULER: ICD-10-CM

## 2022-03-11 DIAGNOSIS — U09.9 PERSISTENT NEUROLOGIC SYMPTOMS AFTER COVID-19: ICD-10-CM

## 2022-03-11 RX ORDER — DEXTROAMPHETAMINE SACCHARATE, AMPHETAMINE ASPARTATE MONOHYDRATE, DEXTROAMPHETAMINE SULFATE AND AMPHETAMINE SULFATE 1.25; 1.25; 1.25; 1.25 MG/1; MG/1; MG/1; MG/1
10 CAPSULE, EXTENDED RELEASE ORAL EVERY MORNING
Qty: 60 CAPSULE | Refills: 0 | Status: SHIPPED | OUTPATIENT
Start: 2022-03-11 | End: 2022-04-11 | Stop reason: SDUPTHER

## 2022-03-11 RX ORDER — DEXTROAMPHETAMINE SACCHARATE, AMPHETAMINE ASPARTATE, DEXTROAMPHETAMINE SULFATE AND AMPHETAMINE SULFATE 1.25; 1.25; 1.25; 1.25 MG/1; MG/1; MG/1; MG/1
5 TABLET ORAL EVERY EVENING
Qty: 30 TABLET | Refills: 0 | Status: SHIPPED | OUTPATIENT
Start: 2022-03-11 | End: 2022-04-11 | Stop reason: SDUPTHER

## 2022-04-06 DIAGNOSIS — G47.30 SLEEP APNEA IN ADULT: Primary | ICD-10-CM

## 2022-04-11 DIAGNOSIS — U09.9 COVID-19 LONG HAULER: ICD-10-CM

## 2022-04-11 DIAGNOSIS — U09.9 PERSISTENT NEUROLOGIC SYMPTOMS AFTER COVID-19: ICD-10-CM

## 2022-04-11 DIAGNOSIS — R41.840 ATTENTION AND CONCENTRATION DEFICIT: ICD-10-CM

## 2022-04-11 DIAGNOSIS — R29.90 PERSISTENT NEUROLOGIC SYMPTOMS AFTER COVID-19: ICD-10-CM

## 2022-04-12 RX ORDER — DEXTROAMPHETAMINE SACCHARATE, AMPHETAMINE ASPARTATE, DEXTROAMPHETAMINE SULFATE AND AMPHETAMINE SULFATE 1.25; 1.25; 1.25; 1.25 MG/1; MG/1; MG/1; MG/1
5 TABLET ORAL EVERY EVENING
Qty: 30 TABLET | Refills: 0 | Status: SHIPPED | OUTPATIENT
Start: 2022-04-12 | End: 2022-05-11 | Stop reason: SDUPTHER

## 2022-04-12 RX ORDER — DEXTROAMPHETAMINE SACCHARATE, AMPHETAMINE ASPARTATE MONOHYDRATE, DEXTROAMPHETAMINE SULFATE AND AMPHETAMINE SULFATE 1.25; 1.25; 1.25; 1.25 MG/1; MG/1; MG/1; MG/1
10 CAPSULE, EXTENDED RELEASE ORAL EVERY MORNING
Qty: 60 CAPSULE | Refills: 0 | Status: SHIPPED | OUTPATIENT
Start: 2022-04-12 | End: 2022-05-11 | Stop reason: SDUPTHER

## 2022-04-18 NOTE — PROGRESS NOTES
Medicare Wellness Visit  Plan for Preventive Care    A good way for you to stay healthy is to use preventive care.  Medicare covers many services that can help you stay healthy.* The goal of these services is to find any health problems as quickly as possible. Finding problems early can help make them easier to treat.  Your personal plan below lists the services you may need and when they are due.     Health Maintenance Summary     DTaP/Tdap/Td Vaccine (1 - Tdap)  Overdue - never done    Shingles Vaccine (1 of 2)  Overdue - never done    COVID-19 Vaccine (4 - Booster for Pfizer series)  Overdue since 2/23/2022    Influenza Vaccine (Season Ended)  Next due on 9/1/2022    Depression Screening (Yearly)  Next due on 4/18/2023    Breast Cancer Screening (Every 2 Years)  Next due on 1/18/2024    Colonoscopy Risk (Every 5 Years)  Next due on 9/4/2024    Pneumococcal Vaccine 65+   Completed    Hepatitis C Screening   Completed    Osteoporosis Screening   Completed    Medicare Advantage- Medicare Wellness Visit   Completed    Hepatitis B Vaccine   Aged Out    Meningococcal Vaccine   Aged Out    HPV Vaccine   Aged Out           Preventive Care for Women and Men    Heart Screenings (Cardiovascular):  · Blood tests are used to check your cholesterol, lipid and triglyceride levels. High levels can increase your risk for heart disease and stroke. High levels can be treated with medications, diet and exercise. Lowering your levels can help keep your heart and blood vessels healthy.  Your provider will order these tests if they are needed.    · An ultrasound is done to see if you have an abdominal aortic aneurysm (AAA).  This is an enlargement of one of the main blood vessels that delivers blood to the body.   In the [place], 9,000 deaths are caused by AAA.  You may not even know you have this problem and as many as 1 in 3 people will have a serious problem if it is not treated.  Early diagnosis allows for more effective  Speech Language Pathology Cognitive-Linguistic Re-Evaluation    Today's date: 8/3/2021  Patients name: Naomi Dixon  : 1980  MRN: [id number]  Safety measures: patient appears safe  Referring provider: GABRIELA Mcknight    Flowsheet:  Start Time: 0700  Stop Time: 0800  Total time in clinic (min): 60 minutes    Subjective Comments: Patient is s/p EEG, MRI brain, CT chest, PFT  She reports speaking with Dr Jin Longoria about all these results  Next neurology appointment is in August  Pt doing renovations in her house with her family, organzation from upstairs living to downstairs living  Reporting panic attack on the the floor  Later that night patient reporting she took aderol instead of her her xanax in error  Spelling and error mistakes still occurring  Pt reporting pending a phonecall from neuropsychology when appointment is available  Pt was seen by her eye doctor and no new change in script  Completed and discharged from Physical Therapy and Occupational Therapy end of   Patient had great accuracy and completion time for activities in session today  Working towards writing while doing IM  Pt's excellent performance in session not with consistent carryover across all settings  Patient concerned about returning back to driving, I reassured her that as far as her cognitive performance she is safe for driving  I encouraged her to speak with her neurologist regarding returning back to driving  She may even benefit from fitness to drive testing to ease her mind  She would greatly benefit from neuropsychology testing at this time but she hasn't receive a call to set up an appointment yet  I provided her with the contact information to call and f/u with the office regarding scheduling an appointment  We did talk about anxiety and depression mental health components today that she does confirm feeling both and these could certainly serve as a barrier to her continued success and carryover  Re-evaluation set up for next session but we are most likely transitioning towards discharge  6/22/2021:  Patient diagnosed with COVID-19 in January 2020  Adderall started June 3rd, improvement with the ability to hold a conversation  Her sustained attention at work has noticeably improved but continues to have difficulty with divided attention  Improvement with physical endurance (able to climb flights of stairs better and able to walk ~1 mile)  SOB continues to vary with too many flights of stairs or humidity factor, patient has pulmonology appointment scheduled tomorrow 6/23/21  MRI brain completed yesterday, results not yet read  EEG scheduled July 6th  Patient was seen by Neurology, pending continued work up  Completed bloodwork and patient reporting all labs WNL  Short term memory symptoms feels "the same" and exacerbated by frustration level or overhwhelming situations  Word finding difficulty has improved significantly but will still occur at times  Pt reports written language ( primarily completed via emails) has improved and written language within emails have returned back to functional  Handwriting mostly continues to be impacted by tremors  Patient reports seen by cardiology s/p full work up "normal"  Pt takes xanax taking up to 2x per day as needed, patient primarily only taking at night and helping with sleep  Sleep has returned back to baseline benefitting from a structured nighttime routine, sleeping ~8 hours uninterrupted  Neuropsychology pending apppointment  Symptoms resolved include: chest pain and no longer napping throughout the day  CXR 3/30 "Cardiomediastinal silhouette appears unremarkable  Tiny focus of linear atelectasis or scarring left costophrenic angle  The lungs are otherwise clear  No pneumothorax or pleural effusion  Osseous structures appear within normal limits for patient age    IMPRESSION:No acute cardiopulmonary disease "    Reason for Referral:Change in cognitive treatment and cure.  If you have a family history of AAA or are a male age 65-75 who has smoked, you are at higher risk of an AAA.  Your provider can order this test, if needed.    Colorectal Screening:  · There are many tests that are used to check for cancer of your colon and rectum. You and your provider should discuss what test is best for you and when to have it done.  Options include:  · Screening Colonoscopy: exam of the entire colon, seen through a flexible lighted tube.  · Flexible Sigmoidoscopy: exam of the last third (sigmoid portion) of the colon and rectum, seen through a flexible lighted tube.  · Cologuard DNA stool test: a sample of your stool is used to screen for cancer and unseen blood in your stool.  · Fecal Occult Blood Test: a sample of your stool is studied to find any unseen blood    Flu Shot:  · An immunization that helps to prevent influenza (the flu). You should get this every year. The best time to get the shot is in the fall.    Pneumococcal Shot:  • Vaccines are available that can help prevent pneumococcal disease, which is any type of infection caused by Streptococcus pneumoniae bacteria.   Their use can prevent some cases of pneumonia, meningitis, and sepsis. There are two types of pneumococcal vaccines:   o Conjugate vaccines (PCV-13 or Prevnar 13®) - helps protect against the 13 types of pneumococcal bacteria that are the most common causes of serious infections in children and adults.    o Polysaccharide vaccine (PPSV23 or Trwfbdypc73®) - helps protect against 23 types of pneumococcal bacteria for patients who are recommended to get it.  These vaccines should be given at least 12 months apart.  A booster is usually not needed.     Hepatitis B Shot:  · An immunization that helps to protect people from getting Hepatitis B. Hepatitis B is a virus that spreads through contact with infected blood or body fluids. Many people with the virus do not have symptoms.  The virus can lead to  serious problems, such as liver disease. Some people are at higher risk than others. Your doctor will tell you if you need this shot.     Diabetes Screening:  · A test to measure sugar (glucose) in your blood is called a fasting blood sugar. Fasting means you cannot have food or drink for at least 8 hours before the test. This test can detect diabetes long before you may notice symptoms.    Glaucoma Screening:  · Glaucoma screening is performed by your eye doctor. The test measures the fluid pressure inside your eyes to determine if you have glaucoma.     Hepatitis C Screening:  · A blood test to see if you have the hepatitis C virus.  Hepatitis C attacks the liver and is a major cause of chronic liver disease.  Medicare will cover a single screening for all adults born between 1945 & 1965, or high risk patients (people who have injected illegal drugs or people who have had blood transfusions).  High risk patients who continue to inject illegal drugs can be screened for Hepatitis C every year.    Smoking and Tobacco-Use Cessation Counseling:  · Tobacco is the single greatest cause of disease and early death in our country today. Medication and counseling together can increase a person’s chance of quitting for good.   · Medicare covers two quitting attempts per year, with four counseling sessions per attempt (eight sessions in a 12 month period)    Preventive Screening tests for Women    Screening Mammograms and Breast Exams:  · An x-ray of your breasts to check for breast cancer before you or your doctor may be able to feel it.  If breast cancer is found early it can usually be treated with success.    Pelvic Exams and Pap Tests:  · An exam to check for cervical and vaginal cancer. A Pap test is a lab test in which cells are taken from your cervix and sent to the lab to look for signs of cervical cancer. If cancer of the cervix is found early, chances for a cure are good. Testing can generally end at age 65, or if a  status  Prior Functional Status:Communication appropriate and efficient in most situations  Minimal difficulty with self-monitoring, self-correction needed     Hearing:Within Normal limits  Vision:Other wears glasses, initially had blurriness  Still varies at times  Pt will be making an appointment  Allergies: No Known Allergies  Primary Language:  English  Preferred Language: English (primary language)   Fluent Western Neisha, Turkish, Jad (patient reporting difficulty with expressive language in all of these languages; however, improvements noticed in all languages)  Home Environment/ Lifestyle:  live home with 2 daughters,  and mother in law  Highest level of Education status:  associates in [employer]  Occupation:  , currently working 40 hours per week  Current / Prior Services being received:  discharged from PT, transitioning towards discharge in OT  Mental Status: Alert  Behavior Status:Cooperative  Communication Modalities: Verbal  Recent Speech/ Language therapy:None  Rehabilitation Prognosis:Good rehab potential to reach the established goals       Cognitive Assessment    The Repeatable Battery for the Assessment of Neuropsychological Status (RBANS) is a brief, individually-administered assessment which measures attention, language, visuospatial/constructional abilities, and immediate & delayed memory  The RBANS is intended for use with adolescents to adults, ages 15 to 80 years  The following results were obtained during the administration of the assessment  Comparison: form D administered 4/14/21 and form A administered 6/22/21    Cognitive Domain/Subtest: Index Score: Percentile Rank: Classification: IE: Status:   IMMEDIATE MEMORY 120 91%ile Superior 103 IMPROVEMENT        1  List Learning (31/40)          2  Story Memory (23/24)           VISUOSPATIAL/  CONSTRUCTIONAL 126 96%ile Superior 116 IMPROVEMENT        3  Figure Copy (20/20)          4   Line Orientation (20/20)           LANGUAGE 103 58%ile Average 95 IMPROVEMENT        5  Picture Naming (10/10)          6  Semantic Fluency (24/40)           ATTENTION 115 84%ile High Average 72 IMPROVEMENT        7  Digit Span (14/16)          8  Coding (54/89)           DELAYED MEMORY 118 88%ile High Average 95 IMPROVEMENT        9  List Recall (8/10)          10  List Recognition (20/20)          11  Story Recall (12/12)          12  Figure Recall (17/20)         Sum of Index Scores:  582  481 IMPROVEMENT   Total Score:  124      Percentile: 95%ile      Classification: Superior            Goals  1  Pt will complete high level multi-tier deductive reasoning puzzles with no more than 2 errors per puzzle  This is ongoing  Patient has progressed through digging logic series and moving through mind benders B series, most recently working toward B12    2  Patient will complete various sustained and divided attention tasks for 2+ minutes while attending to auditory/visual stimuli with task average decreasing closer to the average range  This goal is ongoing, patient doing very well progressing through tasks  UPDATE SHORT TERM GOALS:   1  Pt will complete high level multi-tier deductive reasoning puzzles with no more than 2 errors per puzzle  2   Patient will complete various sustained and divided attention tasks for 2+ minutes while attending to auditory/visual stimuli with task average decreasing closer to the average range  3   Patient will report neuropsychology appt established  4   Patient will report plan toward return to drive  Long Term Goals:  1  Pt will increase cognitive linguistic skills across all settings  2  Pt will increase physical and mental endurance across all settings  Impressions/Recommendations  Patient has made great improvements during this most recent treatment cycle  Goals established above to ensure carryover prior to d/c planning    HEP transition across the next woman has a hysterectomy for a benign condition. Your provider may recommend more frequent testing if certain abnormal results are found.    Bone Mass Measurements:  · A painless x-ray of your bone density to see if you are at risk for a broken bone. Bone density refers to the thickness of bones or how tightly the bone tissue is packed.    Preventive Screening tests for Men    Prostate Screening:  · Should you have a prostate cancer test (PSA)?  It is up to you to decide if you want a prostate cancer test. Talk to your clinician to find out if the test is right for you.  Things for you to consider and talk about should include:  · Benefits and harms of the test  · Your family history  · How your race/ethnicity may influence the test  · If the test may impact other medical conditions you have  · Your values on screenings and treatments    *Medicare pays for many preventive services to keep you healthy. For some of these services, you might have to pay a deductible, coinsurance, and / or copayment.  The amounts vary depending on the type of services you need and the kind of Medicare health plan you have.    For further details on screenings offered by Medicare please visit: https://www.medicare.gov/coverage/preventive-screening-services    2-3 weeks  She verbalized understanding      Recommendations:  -Patient would benefit from outpatient skilled Speech Therapy services : Cognitive-Linguistic therapy   Recommend neurology consult as well as optometry    -Frequency: 2x weekly  -Duration: 2-3 more weeks    -Intervention certification from: 4/6/3533  -Intervention certification to: 7/96/5842  -Intervention comments: puzzles, interactive metronome, word finding strategies  Visit: 27

## 2022-05-11 DIAGNOSIS — R29.90 PERSISTENT NEUROLOGIC SYMPTOMS AFTER COVID-19: ICD-10-CM

## 2022-05-11 DIAGNOSIS — U09.9 COVID-19 LONG HAULER: ICD-10-CM

## 2022-05-11 DIAGNOSIS — U09.9 PERSISTENT NEUROLOGIC SYMPTOMS AFTER COVID-19: ICD-10-CM

## 2022-05-11 DIAGNOSIS — R41.840 ATTENTION AND CONCENTRATION DEFICIT: ICD-10-CM

## 2022-05-12 RX ORDER — DEXTROAMPHETAMINE SACCHARATE, AMPHETAMINE ASPARTATE MONOHYDRATE, DEXTROAMPHETAMINE SULFATE AND AMPHETAMINE SULFATE 1.25; 1.25; 1.25; 1.25 MG/1; MG/1; MG/1; MG/1
10 CAPSULE, EXTENDED RELEASE ORAL EVERY MORNING
Qty: 60 CAPSULE | Refills: 0 | Status: SHIPPED | OUTPATIENT
Start: 2022-05-12 | End: 2022-06-14 | Stop reason: SDUPTHER

## 2022-05-12 RX ORDER — DEXTROAMPHETAMINE SACCHARATE, AMPHETAMINE ASPARTATE, DEXTROAMPHETAMINE SULFATE AND AMPHETAMINE SULFATE 1.25; 1.25; 1.25; 1.25 MG/1; MG/1; MG/1; MG/1
5 TABLET ORAL EVERY EVENING
Qty: 30 TABLET | Refills: 0 | Status: SHIPPED | OUTPATIENT
Start: 2022-05-12 | End: 2022-06-14 | Stop reason: SDUPTHER

## 2022-06-02 ENCOUNTER — RA CDI HCC (OUTPATIENT)
Dept: OTHER | Facility: HOSPITAL | Age: 42
End: 2022-06-02

## 2022-06-02 NOTE — PROGRESS NOTES
[facility]  coding opportunities       Chart reviewed, no opportunity found: CHART REVIEWED, NO OPPORTUNITY FOUND        Patients Insurance        Commercial Insurance: [address] [place] [address]

## 2022-06-09 ENCOUNTER — OFFICE VISIT (OUTPATIENT)
Dept: INTERNAL MEDICINE CLINIC | Facility: CLINIC | Age: 42
End: 2022-06-09
Payer: COMMERCIAL

## 2022-06-09 VITALS
DIASTOLIC BLOOD PRESSURE: 78 MMHG | TEMPERATURE: 97 F | HEART RATE: 72 BPM | BODY MASS INDEX: 37.67 KG/M2 | WEIGHT: 212.6 LBS | OXYGEN SATURATION: 95 % | SYSTOLIC BLOOD PRESSURE: 126 MMHG | HEIGHT: 63 IN

## 2022-06-09 DIAGNOSIS — I10 ESSENTIAL HYPERTENSION: ICD-10-CM

## 2022-06-09 DIAGNOSIS — Z86.16 PERSONAL HISTORY OF COVID-19: ICD-10-CM

## 2022-06-09 DIAGNOSIS — Z13.21 ENCOUNTER FOR VITAMIN DEFICIENCY SCREENING: ICD-10-CM

## 2022-06-09 DIAGNOSIS — R23.2 FACE GOES RED: ICD-10-CM

## 2022-06-09 DIAGNOSIS — U09.9 COVID-19 LONG HAULER: ICD-10-CM

## 2022-06-09 DIAGNOSIS — E66.09 CLASS 2 OBESITY DUE TO EXCESS CALORIES WITHOUT SERIOUS COMORBIDITY WITH BODY MASS INDEX (BMI) OF 36.0 TO 36.9 IN ADULT: ICD-10-CM

## 2022-06-09 DIAGNOSIS — U09.9 PERSISTENT NEUROLOGIC SYMPTOMS AFTER COVID-19: ICD-10-CM

## 2022-06-09 DIAGNOSIS — Z09 FOLLOW-UP EXAM, 3-6 MONTHS SINCE PREVIOUS EXAM: Primary | ICD-10-CM

## 2022-06-09 DIAGNOSIS — L29.9 PRURITUS OF SKIN: ICD-10-CM

## 2022-06-09 DIAGNOSIS — H53.9 VISION CHANGES: ICD-10-CM

## 2022-06-09 DIAGNOSIS — E03.8 OTHER SPECIFIED HYPOTHYROIDISM: ICD-10-CM

## 2022-06-09 DIAGNOSIS — F41.1 GENERALIZED ANXIETY DISORDER: ICD-10-CM

## 2022-06-09 DIAGNOSIS — G47.09 OTHER INSOMNIA: ICD-10-CM

## 2022-06-09 DIAGNOSIS — R60.0 EDEMA OF BOTH FEET: ICD-10-CM

## 2022-06-09 DIAGNOSIS — Z13.220 SCREENING FOR HYPERLIPIDEMIA: ICD-10-CM

## 2022-06-09 DIAGNOSIS — R29.90 PERSISTENT NEUROLOGIC SYMPTOMS AFTER COVID-19: ICD-10-CM

## 2022-06-09 DIAGNOSIS — M79.89 BILATERAL HAND SWELLING: ICD-10-CM

## 2022-06-09 DIAGNOSIS — Z13.1 SCREENING FOR DIABETES MELLITUS: ICD-10-CM

## 2022-06-09 DIAGNOSIS — R41.840 ATTENTION AND CONCENTRATION DEFICIT: ICD-10-CM

## 2022-06-09 DIAGNOSIS — L81.9 DISCOLORATION OF SKIN OF FACE: ICD-10-CM

## 2022-06-09 PROBLEM — Z92.89 HISTORY OF ECHOCARDIOGRAM: Status: ACTIVE | Noted: 2022-06-09

## 2022-06-09 PROBLEM — R40.4 TRANSIENT ALTERATION OF AWARENESS: Status: ACTIVE | Noted: 2022-06-09

## 2022-06-09 PROBLEM — Z98.890 HISTORY OF HOLTER MONITORING: Status: ACTIVE | Noted: 2022-06-09

## 2022-06-09 PROBLEM — R00.1: Status: RESOLVED | Noted: 2021-03-03 | Resolved: 2022-06-09

## 2022-06-09 PROBLEM — R07.9 CHEST PAIN: Status: RESOLVED | Noted: 2021-03-11 | Resolved: 2022-06-09

## 2022-06-09 PROCEDURE — 3008F BODY MASS INDEX DOCD: CPT | Performed by: NURSE PRACTITIONER

## 2022-06-09 PROCEDURE — 99214 OFFICE O/P EST MOD 30 MIN: CPT | Performed by: NURSE PRACTITIONER

## 2022-06-09 PROCEDURE — 1036F TOBACCO NON-USER: CPT | Performed by: NURSE PRACTITIONER

## 2022-06-09 NOTE — ASSESSMENT & PLAN NOTE
· Continue adderall  Patient has Based upon the Diagnostic and Statistical Manual of Mental Health Disorders (DSM-5 guide for mental health disorders)    Patient has positive findings as listed on her Adult ADHD-RS-IV with Adult Prompts Document  Choose from: no/none; mild, moderate, severe  to fill in where the bolded words are located  1  She has had severe findings listed under the carelessness topic:  making increased mistakes, rushing through work, not checking work, messy workspace  2  She has had severe findings listed under the difficulty sustaining attention in activities topics:    Where she has trouble paying attention, able to stay focused on her work, takes longer to complete her tasks as normal, and has trouble remembering what she has read  3  She has had severe findings listed under the does not listen topics:   Others have complain that she does not seem to listen or respond when spoken to, she needs people to repeat directions, and has noticed that she misses key parts of conversations  4  She has moderate findings listed under the no follow-through topic:    She has trouble finishing things such as work and chores, she does leave things half done her on finished, she has trouble following instructions which are complex and multi steps, and she needs to frequently would write things down oral she will forget them  5  She has severe findings listed under the can not organized topic:    She has trouble organizing tasks and prioritizing work and chores, she does require assistance of others to help plan for her, she does have trouble with time management, and she does procrastinate  6  She has severe findings listed under the avoids/dislikes task requiring sustained mental effort topic:    She avoids tasks that are difficult her lengthy, she has to force herself to finish these tasks, she finds it extremely hard, and she continues to procrastinate    7  She has moderate findings listed under the lose is important items topic:  She does frequently loses things, and is always looking for important items  She does seem to get in trouble for this  She also seems to put items in a safe place that she is unable to find later  8  She has severe findings listed under the easily distractible topic:  She is easily distracted by other conversations, television, radio  She does have to remain in isolation of some sort in order to get any work done  Finds it difficult to get back on track once she is off track, and will find other things to do rather than her chores or work  9  She has severe findings listed under the forgetful in daily activities topic:  She forgets her daily routine items, and forgets to bring things to and from work on a daily basis  She does have to make numerous notes  10  She has severe findings listed under the score was and fidgets topic:  She is unable to sit still, and always is fidgeting in her chair  She does tap and move her feet or pen or pencil  She does away seem to be fussing either with her hair or clothing and she states that she cannot stop moving it seems automatic  11  She has severe findings listed under the can not stay seated topic:  She does have trouble sitting in 1 section/seat for any length of time  She states that she would rather be up and standing in able to walk rather than sitting  She does have to force herself to stay seated  She does usually stay away from any requirements where she will need to be seated for long periods of time  12  She has severe findings listed under the runs/climbs excessively topic:  She was feels physically restless, and does seem more agitated when she has made to sit still  13  She has severe findings listed under the can not play/work quietly topic:  She is unable to sit and read a book, in 0 use needs to be doing some type of physical activity    14  She has severe findings listed under the on the go, driven by a motor topic:  She feels hard to slow down, and that she always has a lot of energy and is always on the go  She is unable to relax  15  She has severe findings listed under the talks excessively topic:  She talks a lot, all the time, definitely more than other people  She also finds herself to be much louder than other people  16  She has severe findings listed under the blurts out answers topic:  She states that she does blurred out answers, is always having a hard time finding the rationale to wait her turn  17  She has moderate findings listed under the can not wait for turn topic:  Again she finds it hard to her weight her turn, she feels frustrated when there are delays or when she has to wait and lines  18  She has moderate findings listed under the intrudes/interrupts others topic:  She does but into other people's conversations and seems to interrupt others  This patient was educated on side effects, risks of taking this type of medication which include abuse, misuse, dependence, addiction and tolerance  All questions were answered including different dosing levels, increasing and decreasing of of this medication  We discussed their continued open discussions with the PCP in order to make sure that they are on the correct dose  We reviewed the potential side effects of the medications including, but are not limited to, constipation, diarrhea, nausea/upset stomach, drowsiness, fatigue, dizziness, urinary retention, visual changes, insomnia, headaches, nightmares, slowed breathing while sleeping, UTI issues, impaired judgment, addiction issues and the risk of fatal overdose if not taken as prescribed  We discussed the importance of notifying the office/provider immediately of any side effects     We discussed the importance of immediate notification if there are any feelings of suicidality thoughts or behaviors   We discussed the importance of contacting the office if he has any tachycardia or fainting situations  The patient was warned against driving while taking sedation medications  We discussed that sharing medications is a felony  We discussed other side effects such as QT prolongation, risks of Myocardial Infarction (heart attack), stroke and sudden death  We discussed immediate notification if there is any seizure-like activity  We discussed issues with angioedema as an anaphylactic reactions  I have personally reviewed the [facility] ([facility]) website prior to prescribing this medication  The patient understands and agrees to use these medications only as prescribed  At this point in time, the patient is showing no signs of addiction, abuse, diversion or suicidal ideation  All the patient's questions were answered to their satisfaction  [place] Prescription Drug Monitoring Program report was reviewed and was appropriate   All the patient's questions were answered to his/her satisfaction      ·

## 2022-06-09 NOTE — ASSESSMENT & PLAN NOTE
· Only when in the sun  · She has no sunburm  · Affects more of her arms and legs   · Has edema of the area

## 2022-06-09 NOTE — PROGRESS NOTES
Assessment/Plan:     Problem List Items Addressed This Visit        Endocrine    Other specified hypothyroidism     Continue levothyroxine   Will check labs            Relevant Orders    T3, free    Anti-thyroglobulin antibody    PTH, intact       Cardiovascular and Mediastinum    Essential hypertension     Continue metoprolol           Relevant Orders    CBC and differential    Comprehensive metabolic panel       Musculoskeletal and Integument    Discoloration of skin of face     Had 'rosacea' in the past   And now having flares every few weeks             Relevant Orders    CBC and differential    Comprehensive metabolic panel    Pruritus of skin     Only when in the sun  She has no sunburm  Affects more of her arms and legs   Has edema of the area           Relevant Orders    CBC and differential    Comprehensive metabolic panel       Other    Class 2 obesity due to excess calories without serious comorbidity with body mass index (BMI) of 36 0 to 36 9 in adult     Lifestyle modification, diet and exercise discussed            Personal history of COVID-19    Face goes red    COVID-19 long hauler    Attention and concentration deficit     Continue adderall  Patient has Based upon the Diagnostic and Statistical Manual of Mental Health Disorders (DSM-5 guide for mental health disorders)    Patient has positive findings as listed on her Adult ADHD-RS-IV with Adult Prompts Document  Choose from: no/none; mild, moderate, severe  to fill in where the bolded words are located  1  She has had severe findings listed under the carelessness topic:  making increased mistakes, rushing through work, not checking work, messy workspace  2  She has had severe findings listed under the difficulty sustaining attention in activities topics:    Where she has trouble paying attention, able to stay focused on her work, takes longer to complete her tasks as normal, and has trouble remembering what she has read    3  She has had severe findings listed under the does not listen topics:   Others have complain that she does not seem to listen or respond when spoken to, she needs people to repeat directions, and has noticed that she misses key parts of conversations  4  She has moderate findings listed under the no follow-through topic:    She has trouble finishing things such as work and chores, she does leave things half done her on finished, she has trouble following instructions which are complex and multi steps, and she needs to frequently would write things down oral she will forget them  5  She has severe findings listed under the can not organized topic:    She has trouble organizing tasks and prioritizing work and chores, she does require assistance of others to help plan for her, she does have trouble with time management, and she does procrastinate  6  She has severe findings listed under the avoids/dislikes task requiring sustained mental effort topic:    She avoids tasks that are difficult her lengthy, she has to force herself to finish these tasks, she finds it extremely hard, and she continues to procrastinate  7  She has moderate findings listed under the lose is important items topic:  She does frequently loses things, and is always looking for important items  She does seem to get in trouble for this  She also seems to put items in a safe place that she is unable to find later  8  She has severe findings listed under the easily distractible topic:  She is easily distracted by other conversations, television, radio  She does have to remain in isolation of some sort in order to get any work done  Finds it difficult to get back on track once she is off track, and will find other things to do rather than her chores or work  9  She has severe findings listed under the forgetful in daily activities topic:  She forgets her daily routine items, and forgets to bring things to and from work on a daily basis    She does have to make numerous notes  10  She has severe findings listed under the score was and fidgets topic:  She is unable to sit still, and always is fidgeting in her chair  She does tap and move her feet or pen or pencil  She does away seem to be fussing either with her hair or clothing and she states that she cannot stop moving it seems automatic  11  She has severe findings listed under the can not stay seated topic:  She does have trouble sitting in 1 section/seat for any length of time  She states that she would rather be up and standing in able to walk rather than sitting  She does have to force herself to stay seated  She does usually stay away from any requirements where she will need to be seated for long periods of time  12  She has severe findings listed under the runs/climbs excessively topic:  She was feels physically restless, and does seem more agitated when she has made to sit still  13  She has severe findings listed under the can not play/work quietly topic:  She is unable to sit and read a book, in 0 use needs to be doing some type of physical activity  14  She has severe findings listed under the on the go, driven by a motor topic:  She feels hard to slow down, and that she always has a lot of energy and is always on the go  She is unable to relax  15  She has severe findings listed under the talks excessively topic:  She talks a lot, all the time, definitely more than other people  She also finds herself to be much louder than other people  16  She has severe findings listed under the blurts out answers topic:  She states that she does blurred out answers, is always having a hard time finding the rationale to wait her turn  17  She has moderate findings listed under the can not wait for turn topic:  Again she finds it hard to her weight her turn, she feels frustrated when there are delays or when she has to wait and lines    18  She has moderate findings listed under the intrudes/interrupts others topic:  She does but into other people's conversations and seems to interrupt others  This patient was educated on side effects, risks of taking this type of medication which include abuse, misuse, dependence, addiction and tolerance  All questions were answered including different dosing levels, increasing and decreasing of of this medication  We discussed their continued open discussions with the PCP in order to make sure that they are on the correct dose  We reviewed the potential side effects of the medications including, but are not limited to, constipation, diarrhea, nausea/upset stomach, drowsiness, fatigue, dizziness, urinary retention, visual changes, insomnia, headaches, nightmares, slowed breathing while sleeping, UTI issues, impaired judgment, addiction issues and the risk of fatal overdose if not taken as prescribed  We discussed the importance of notifying the office/provider immediately of any side effects  We discussed the importance of immediate notification if there are any feelings of suicidality thoughts or behaviors   We discussed the importance of contacting the office if he has any tachycardia or fainting situations  The patient was warned against driving while taking sedation medications  We discussed that sharing medications is a felony  We discussed other side effects such as QT prolongation, risks of Myocardial Infarction (heart attack), stroke and sudden death  We discussed immediate notification if there is any seizure-like activity  We discussed issues with angioedema as an anaphylactic reactions  I have personally reviewed the [facility] ([facility]) website prior to prescribing this medication  The patient understands and agrees to use these medications only as prescribed  At this point in time, the patient is showing no signs of addiction, abuse, diversion or suicidal ideation    All the patient's questions were answered to their satisfaction  [address] [place] [address] Prescription Drug Monitoring Program report was reviewed and was appropriate   All the patient's questions were answered to his/her satisfaction  Generalized anxiety disorder     Continue prn xanax, daily remeron            Persistent neurologic symptoms after COVID-19    Follow-up exam, 3-6 months since previous exam - Primary    Vision changes     Needs more light to see things than normal  Has a hard time seeing highlighted things  Relevant Orders    CBC and differential    Comprehensive metabolic panel    Edema of both feet     Will check labs           Bilateral hand swelling     Will check labs            Other insomnia     2022  Does not 'feel tired'  Does take melatonin and this helps her sleep - in 30 minutes she is able to fall asleep  But she does not sleep long   states She snores more, gasps for air  Has an adjustable bed  2022 took melatonin and slept for 4 hours and 48 minutes   She has a watch and phone sonya  Has a sleep study              Encounter for vitamin deficiency screening     Will check labs            Relevant Orders    Magnesium    Vitamin D 25 hydroxy    Vitamin B12    Screening for diabetes mellitus    Relevant Orders    HEMOGLOBIN A1C W/ EAG ESTIMATION    Screening for hyperlipidemia    Relevant Orders    Lipid panel          Subjective:      Patient ID: Jonathan Lobato is a 42 y o  female  The patient is here with general questions and concerns  The following portions of the patient's history were reviewed and updated as appropriate:     Past Medical History:  She has a past medical history of Allergic rhinitis  ,  _______________________________________________________________________  Medical Problems:  does not have any pertinent problems on file ,  _______________________________________________________________________  Past Surgical History:   has a past surgical history that includes  section; Hysterectomy; and Oophorectomy  ,  _______________________________________________________________________  Family History:  family history includes Asthma in her father; Breast cancer in her maternal grandmother; Hypertension in her father and mother; Muscular dystrophy in her mother; No Known Problems in her daughter, daughter, maternal aunt, paternal aunt, and paternal grandmother; Raynaud syndrome in her sister  ,  _______________________________________________________________________  Social History:   reports that she quit smoking about 19 years ago  Her smoking use included cigarettes  She has a 0 20 pack-year smoking history  She has never used smokeless tobacco  She reports current alcohol use of about 1 0 - 2 0 standard drink of alcohol per week  She reports that she does not use drugs  ,  _______________________________________________________________________  Allergies:  is allergic to nuts - food allergy, amoxicillin, penicillins, and sulfamethoxazole-trimethoprim     _______________________________________________________________________  Current Outpatient Medications   Medication Sig Dispense Refill    amphetamine-dextroamphetamine (ADDERALL XR) 5 MG 24 hr capsule Take 2 capsules (10 mg total) by mouth every morning Max Daily Amount: 10 mg 60 capsule 0    amphetamine-dextroamphetamine (ADDERALL, 5MG,) 5 MG tablet Take 1 tablet (5 mg total) by mouth every evening Max Daily Amount: 5 mg 30 tablet 0    Ascorbic Acid (vitamin C) 1000 MG tablet Take 1,000 mg by mouth daily      levothyroxine 100 mcg tablet TAKE 1 TABLET BY MOUTH EVERY DAY 90 tablet 1    MELATONIN ER PO Take by mouth      metoprolol succinate (TOPROL-XL) 50 mg 24 hr tablet Take 1 tablet (50 mg total) by mouth daily 90 tablet 3    mirtazapine (REMERON) 15 mg tablet Take 1 tablet (15 mg total) by mouth daily at bedtime 90 tablet 3    Multiple Vitamins-Minerals (MULTIVITAL PO) Take 1 capsule by mouth daily        zinc sulfate (ZINCATE) 220 mg capsule Take 220 mg by mouth daily      ALPRAZolam (XANAX) 0 25 mg tablet Take 1 tablet (0 25 mg total) by mouth 2 (two) times a day as needed for anxiety 60 tablet 0     No current facility-administered medications for this visit      _______________________________________________________________________      Review of Systems   Constitutional: Positive for fatigue  Negative for activity change, chills and fever  HENT: Negative for rhinorrhea and sore throat  Eyes: Negative for pain  Respiratory: Negative for cough and shortness of breath  Cardiovascular: Positive for leg swelling  Negative for chest pain and palpitations  Gastrointestinal: Negative for abdominal pain, constipation, diarrhea, nausea and vomiting  Genitourinary: Negative for difficulty urinating, flank pain, frequency and urgency  Musculoskeletal: Positive for arthralgias and myalgias  Negative for gait problem and joint swelling  Skin: Positive for rash  Negative for color change  Neurological: Positive for tremors  Negative for dizziness, weakness, light-headedness and headaches  Psychiatric/Behavioral: Positive for sleep disturbance  The patient is nervous/anxious  All other systems reviewed and are negative  Objective:  Vitals:    06/09/22 0839   BP: 126/78   BP Location: Left arm   Patient Position: Sitting   Cuff Size: Standard   Pulse: 72   Temp: (!) 97 °F (36 1 °C)   SpO2: 95%   Weight: 96 4 kg (212 lb 9 6 oz)   Height: 5' 3" (1 6 m)     Body mass index is 37 66 kg/m²  Physical Exam  Vitals reviewed  Constitutional:       General: She is awake  Appearance: Normal appearance  She is well-developed and normal weight  HENT:      Head: Normocephalic and atraumatic  Comments: Vision changes as discussed      Nose: Nose normal       Mouth/Throat:      Mouth: Mucous membranes are moist    Eyes:      Extraocular Movements: Extraocular movements intact     Cardiovascular:      Rate and Rhythm: Normal rate and regular rhythm  Pulses: Normal pulses  Heart sounds: Normal heart sounds  Pulmonary:      Effort: Pulmonary effort is normal       Breath sounds: Normal breath sounds  Abdominal:      General: Bowel sounds are normal       Palpations: Abdomen is soft  Musculoskeletal:         General: Normal range of motion  Cervical back: Normal range of motion  Right lower leg: No edema  Left lower leg: No edema  Comments: Generalized weakness    Skin:     General: Skin is warm and dry  Comments: Redness issues    Neurological:      Mental Status: She is alert and oriented to person, place, and time  Psychiatric:         Attention and Perception: Attention normal          Mood and Affect: Mood normal          Speech: Speech normal          Behavior: Behavior normal  Behavior is cooperative

## 2022-06-09 NOTE — PATIENT INSTRUCTIONS
WE would like to take a minute to say thank you for choosing Nid[facility] Murillo as your PCP  As a team Nidhi Murillo and Gagan Marion are always trying to make our patients feel more comfortable and important    Because you are important to us! After your appointment is complete you'll receive a brief survey either by text or on your MyChart  If you could take a couple moments and let us know how we are doing, we would really appreciate it  If you're happy with your appointment let us know that too! Thank you! Please remember we are always here for you! We look forward to hearing from you! Stay healthy!                                             Claudia Grimm and Gagan Marion     ______________________________________________________________________    Problem List Items Addressed This Visit          Endocrine    Other specified hypothyroidism     Continue levothyroxine   Will check labs            Relevant Orders    T3, free    Anti-thyroglobulin antibody    PTH, intact       Cardiovascular and Mediastinum    Essential hypertension     Continue metoprolol              Musculoskeletal and Integument    Discoloration of skin of face     Had 'rosacea' in the past   And now having flares every few weeks             Pruritus of skin     Only when in the sun  She has no sunburm  Affects more of her arms and legs   Has edema of the area              Other    Class 2 obesity due to excess calories without serious comorbidity with body mass index (BMI) of 36 0 to 36 9 in adult     Lifestyle modification, diet and exercise discussed            Personal history of COVID-19    Face goes red    COVID-19 long hauler    Attention and concentration deficit     Continue adderall  Patient has Based upon the Diagnostic and Statistical Manual of Mental Health Disorders (DSM-5 guide for mental health disorders)    Patient has positive findings as listed on her Adult ADHD-RS-IV with Adult Prompts Document  Choose from: no/none; mild, moderate, severe  to fill in where the bolded words are located  1  She has had severe findings listed under the carelessness topic:  making increased mistakes, rushing through work, not checking work, messy workspace  2  She has had severe findings listed under the difficulty sustaining attention in activities topics:    Where she has trouble paying attention, able to stay focused on her work, takes longer to complete her tasks as normal, and has trouble remembering what she has read  3  She has had severe findings listed under the does not listen topics:   Others have complain that she does not seem to listen or respond when spoken to, she needs people to repeat directions, and has noticed that she misses key parts of conversations  4  She has moderate findings listed under the no follow-through topic:    She has trouble finishing things such as work and chores, she does leave things half done her on finished, she has trouble following instructions which are complex and multi steps, and she needs to frequently would write things down oral she will forget them  5  She has severe findings listed under the can not organized topic:    She has trouble organizing tasks and prioritizing work and chores, she does require assistance of others to help plan for her, she does have trouble with time management, and she does procrastinate  6  She has severe findings listed under the avoids/dislikes task requiring sustained mental effort topic:    She avoids tasks that are difficult her lengthy, she has to force herself to finish these tasks, she finds it extremely hard, and she continues to procrastinate  7  She has moderate findings listed under the lose is important items topic:  She does frequently loses things, and is always looking for important items  She does seem to get in trouble for this    She also seems to put items in a safe place that she is unable to find later  8  She has severe findings listed under the easily distractible topic:  She is easily distracted by other conversations, television, radio  She does have to remain in isolation of some sort in order to get any work done  Finds it difficult to get back on track once she is off track, and will find other things to do rather than her chores or work  9  She has severe findings listed under the forgetful in daily activities topic:  She forgets her daily routine items, and forgets to bring things to and from work on a daily basis  She does have to make numerous notes  10  She has severe findings listed under the score was and fidgets topic:  She is unable to sit still, and always is fidgeting in her chair  She does tap and move her feet or pen or pencil  She does away seem to be fussing either with her hair or clothing and she states that she cannot stop moving it seems automatic  11  She has severe findings listed under the can not stay seated topic:  She does have trouble sitting in 1 section/seat for any length of time  She states that she would rather be up and standing in able to walk rather than sitting  She does have to force herself to stay seated  She does usually stay away from any requirements where she will need to be seated for long periods of time  12  She has severe findings listed under the runs/climbs excessively topic:  She was feels physically restless, and does seem more agitated when she has made to sit still  13  She has severe findings listed under the can not play/work quietly topic:  She is unable to sit and read a book, in 0 use needs to be doing some type of physical activity  14  She has severe findings listed under the on the go, driven by a motor topic:  She feels hard to slow down, and that she always has a lot of energy and is always on the go  She is unable to relax  15   She has severe findings listed under the talks excessively topic:  She talks a lot, all the time, definitely more than other people  She also finds herself to be much louder than other people  16  She has severe findings listed under the blurts out answers topic:  She states that she does blurred out answers, is always having a hard time finding the rationale to wait her turn  17  She has moderate findings listed under the can not wait for turn topic:  Again she finds it hard to her weight her turn, she feels frustrated when there are delays or when she has to wait and lines  18  She has moderate findings listed under the intrudes/interrupts others topic:  She does but into other people's conversations and seems to interrupt others  This patient was educated on side effects, risks of taking this type of medication which include abuse, misuse, dependence, addiction and tolerance  All questions were answered including different dosing levels, increasing and decreasing of of this medication  We discussed their continued open discussions with the PCP in order to make sure that they are on the correct dose  We reviewed the potential side effects of the medications including, but are not limited to, constipation, diarrhea, nausea/upset stomach, drowsiness, fatigue, dizziness, urinary retention, visual changes, insomnia, headaches, nightmares, slowed breathing while sleeping, UTI issues, impaired judgment, addiction issues and the risk of fatal overdose if not taken as prescribed  We discussed the importance of notifying the office/provider immediately of any side effects  We discussed the importance of immediate notification if there are any feelings of suicidality thoughts or behaviors   We discussed the importance of contacting the office if he has any tachycardia or fainting situations  The patient was warned against driving while taking sedation medications  We discussed that sharing medications is a felony     We discussed other side effects such as QT prolongation, risks of Myocardial Infarction (heart attack), stroke and sudden death  We discussed immediate notification if there is any seizure-like activity  We discussed issues with angioedema as an anaphylactic reactions  I have personally reviewed the [facility] ([facility]) website prior to prescribing this medication  The patient understands and agrees to use these medications only as prescribed  At this point in time, the patient is showing no signs of addiction, abuse, diversion or suicidal ideation  All the patient's questions were answered to their satisfaction  [place] Prescription Drug Monitoring Program report was reviewed and was appropriate   All the patient's questions were answered to his/her satisfaction  Generalized anxiety disorder     Continue prn xanax, daily remeron            Persistent neurologic symptoms after COVID-19    Follow-up exam, 3-6 months since previous exam - Primary    Vision changes     Needs more light to see things than normal  Has a hard time seeing highlighted things              Edema of both feet     Will check labs           Bilateral hand swelling     Will check labs            Other insomnia     6/9/2022  Does not 'feel tired'  Does take melatonin and this helps her sleep - in 30 minutes she is able to fall asleep  But she does not sleep long   states She snores more, gasps for air  Has an adjustable bed  6/8/2022 took melatonin and slept for 4 hours and 48 minutes   She has a watch and phone sonya  Has a sleep study              Encounter for vitamin deficiency screening     Will check labs            Relevant Orders    Magnesium    Vitamin D 25 hydroxy    Vitamin B12    Screening for diabetes mellitus    Relevant Orders    HEMOGLOBIN A1C W/ EAG ESTIMATION

## 2022-06-09 NOTE — ASSESSMENT & PLAN NOTE
· 6/9/2022  · Does not 'feel tired'  · Does take melatonin and this helps her sleep - in 30 minutes she is able to fall asleep  · But she does not sleep long  ·  states She snores more, gasps for air  · Has an adjustable bed  · 6/8/2022 took melatonin and slept for 4 hours and 48 minutes   · She has a watch and phone sonya  · Has a sleep study

## 2022-06-10 ENCOUNTER — APPOINTMENT (OUTPATIENT)
Dept: LAB | Facility: CLINIC | Age: 42
End: 2022-06-10
Payer: COMMERCIAL

## 2022-06-10 DIAGNOSIS — H53.9 VISION CHANGES: ICD-10-CM

## 2022-06-10 DIAGNOSIS — E03.8 OTHER SPECIFIED HYPOTHYROIDISM: ICD-10-CM

## 2022-06-10 DIAGNOSIS — L81.9 DISCOLORATION OF SKIN OF FACE: ICD-10-CM

## 2022-06-10 DIAGNOSIS — Z00.00 ANNUAL PHYSICAL EXAM: ICD-10-CM

## 2022-06-10 DIAGNOSIS — I10 ESSENTIAL HYPERTENSION: ICD-10-CM

## 2022-06-10 DIAGNOSIS — L29.9 PRURITUS OF SKIN: ICD-10-CM

## 2022-06-10 DIAGNOSIS — Z13.220 SCREENING FOR HYPERLIPIDEMIA: ICD-10-CM

## 2022-06-10 DIAGNOSIS — Z13.21 ENCOUNTER FOR VITAMIN DEFICIENCY SCREENING: ICD-10-CM

## 2022-06-10 DIAGNOSIS — Z13.1 SCREENING FOR DIABETES MELLITUS: ICD-10-CM

## 2022-06-10 LAB
25(OH)D3 SERPL-MCNC: 66.6 NG/ML (ref 30–100)
ALBUMIN SERPL BCP-MCNC: 3.7 G/DL (ref 3.5–5)
ALP SERPL-CCNC: 99 U/L (ref 46–116)
ALT SERPL W P-5'-P-CCNC: 90 U/L (ref 12–78)
ANION GAP SERPL CALCULATED.3IONS-SCNC: 6 MMOL/L (ref 4–13)
AST SERPL W P-5'-P-CCNC: 47 U/L (ref 5–45)
BASOPHILS # BLD AUTO: 0.06 THOUSANDS/ΜL (ref 0–0.1)
BASOPHILS NFR BLD AUTO: 1 % (ref 0–1)
BILIRUB SERPL-MCNC: 0.36 MG/DL (ref 0.2–1)
BUN SERPL-MCNC: 14 MG/DL (ref 5–25)
CALCIUM SERPL-MCNC: 9.4 MG/DL (ref 8.3–10.1)
CHLORIDE SERPL-SCNC: 107 MMOL/L (ref 100–108)
CHOLEST SERPL-MCNC: 171 MG/DL
CO2 SERPL-SCNC: 25 MMOL/L (ref 21–32)
CREAT SERPL-MCNC: 0.75 MG/DL (ref 0.6–1.3)
EOSINOPHIL # BLD AUTO: 0.22 THOUSAND/ΜL (ref 0–0.61)
EOSINOPHIL NFR BLD AUTO: 3 % (ref 0–6)
ERYTHROCYTE [DISTWIDTH] IN BLOOD BY AUTOMATED COUNT: 12.9 % (ref 11.6–15.1)
EST. AVERAGE GLUCOSE BLD GHB EST-MCNC: 103 MG/DL
GFR SERPL CREATININE-BSD FRML MDRD: 98 ML/MIN/1.73SQ M
GLUCOSE P FAST SERPL-MCNC: 95 MG/DL (ref 65–99)
HBA1C MFR BLD: 5.2 %
HCT VFR BLD AUTO: 42.7 % (ref 34.8–46.1)
HDLC SERPL-MCNC: 37 MG/DL
HGB BLD-MCNC: 14 G/DL (ref 11.5–15.4)
IMM GRANULOCYTES # BLD AUTO: 0.03 THOUSAND/UL (ref 0–0.2)
IMM GRANULOCYTES NFR BLD AUTO: 0 % (ref 0–2)
LDLC SERPL CALC-MCNC: 112 MG/DL (ref 0–100)
LYMPHOCYTES # BLD AUTO: 2.28 THOUSANDS/ΜL (ref 0.6–4.47)
LYMPHOCYTES NFR BLD AUTO: 31 % (ref 14–44)
MAGNESIUM SERPL-MCNC: 2.2 MG/DL (ref 1.6–2.6)
MCH RBC QN AUTO: 31.6 PG (ref 26.8–34.3)
MCHC RBC AUTO-ENTMCNC: 32.8 G/DL (ref 31.4–37.4)
MCV RBC AUTO: 96 FL (ref 82–98)
MONOCYTES # BLD AUTO: 0.63 THOUSAND/ΜL (ref 0.17–1.22)
MONOCYTES NFR BLD AUTO: 9 % (ref 4–12)
NEUTROPHILS # BLD AUTO: 4.07 THOUSANDS/ΜL (ref 1.85–7.62)
NEUTS SEG NFR BLD AUTO: 56 % (ref 43–75)
NONHDLC SERPL-MCNC: 134 MG/DL
NRBC BLD AUTO-RTO: 0 /100 WBCS
PLATELET # BLD AUTO: 169 THOUSANDS/UL (ref 149–390)
PMV BLD AUTO: 12.1 FL (ref 8.9–12.7)
POTASSIUM SERPL-SCNC: 4.3 MMOL/L (ref 3.5–5.3)
PROT SERPL-MCNC: 7.4 G/DL (ref 6.4–8.2)
PTH-INTACT SERPL-MCNC: 44.3 PG/ML (ref 18.4–80.1)
RBC # BLD AUTO: 4.43 MILLION/UL (ref 3.81–5.12)
SODIUM SERPL-SCNC: 138 MMOL/L (ref 136–145)
T3FREE SERPL-MCNC: 2.83 PG/ML (ref 2.3–4.2)
T4 FREE SERPL-MCNC: 1.19 NG/DL (ref 0.76–1.46)
TRIGL SERPL-MCNC: 111 MG/DL
TSH SERPL DL<=0.05 MIU/L-ACNC: 2.72 UIU/ML (ref 0.45–4.5)
VIT B12 SERPL-MCNC: 629 PG/ML (ref 100–900)
WBC # BLD AUTO: 7.29 THOUSAND/UL (ref 4.31–10.16)

## 2022-06-10 PROCEDURE — 86800 THYROGLOBULIN ANTIBODY: CPT

## 2022-06-10 PROCEDURE — 83036 HEMOGLOBIN GLYCOSYLATED A1C: CPT

## 2022-06-10 PROCEDURE — 80061 LIPID PANEL: CPT

## 2022-06-10 PROCEDURE — 36415 COLL VENOUS BLD VENIPUNCTURE: CPT

## 2022-06-10 PROCEDURE — 84439 ASSAY OF FREE THYROXINE: CPT

## 2022-06-10 PROCEDURE — 82306 VITAMIN D 25 HYDROXY: CPT

## 2022-06-10 PROCEDURE — 82607 VITAMIN B-12: CPT

## 2022-06-10 PROCEDURE — 83735 ASSAY OF MAGNESIUM: CPT

## 2022-06-10 PROCEDURE — 83970 ASSAY OF PARATHORMONE: CPT

## 2022-06-10 PROCEDURE — 84481 FREE ASSAY (FT-3): CPT

## 2022-06-10 PROCEDURE — 85025 COMPLETE CBC W/AUTO DIFF WBC: CPT

## 2022-06-10 PROCEDURE — 84443 ASSAY THYROID STIM HORMONE: CPT

## 2022-06-10 PROCEDURE — 80053 COMPREHEN METABOLIC PANEL: CPT

## 2022-06-11 LAB — THYROGLOB AB SERPL-ACNC: <1 IU/ML (ref 0–0.9)

## 2022-06-14 DIAGNOSIS — U09.9 COVID-19 LONG HAULER: ICD-10-CM

## 2022-06-14 DIAGNOSIS — R41.840 ATTENTION AND CONCENTRATION DEFICIT: ICD-10-CM

## 2022-06-14 DIAGNOSIS — U09.9 PERSISTENT NEUROLOGIC SYMPTOMS AFTER COVID-19: ICD-10-CM

## 2022-06-14 DIAGNOSIS — R29.90 PERSISTENT NEUROLOGIC SYMPTOMS AFTER COVID-19: ICD-10-CM

## 2022-06-15 RX ORDER — DEXTROAMPHETAMINE SACCHARATE, AMPHETAMINE ASPARTATE, DEXTROAMPHETAMINE SULFATE AND AMPHETAMINE SULFATE 1.25; 1.25; 1.25; 1.25 MG/1; MG/1; MG/1; MG/1
5 TABLET ORAL EVERY EVENING
Qty: 30 TABLET | Refills: 0 | Status: SHIPPED | OUTPATIENT
Start: 2022-06-15 | End: 2022-07-14 | Stop reason: SDUPTHER

## 2022-06-15 RX ORDER — DEXTROAMPHETAMINE SACCHARATE, AMPHETAMINE ASPARTATE MONOHYDRATE, DEXTROAMPHETAMINE SULFATE AND AMPHETAMINE SULFATE 1.25; 1.25; 1.25; 1.25 MG/1; MG/1; MG/1; MG/1
10 CAPSULE, EXTENDED RELEASE ORAL EVERY MORNING
Qty: 60 CAPSULE | Refills: 0 | Status: SHIPPED | OUTPATIENT
Start: 2022-06-15 | End: 2022-07-14 | Stop reason: SDUPTHER

## 2022-06-17 ENCOUNTER — TELEPHONE (OUTPATIENT)
Dept: INTERNAL MEDICINE CLINIC | Facility: CLINIC | Age: 42
End: 2022-06-17

## 2022-07-14 DIAGNOSIS — R29.90 PERSISTENT NEUROLOGIC SYMPTOMS AFTER COVID-19: ICD-10-CM

## 2022-07-14 DIAGNOSIS — R41.840 ATTENTION AND CONCENTRATION DEFICIT: ICD-10-CM

## 2022-07-14 DIAGNOSIS — U09.9 COVID-19 LONG HAULER: ICD-10-CM

## 2022-07-14 DIAGNOSIS — U09.9 PERSISTENT NEUROLOGIC SYMPTOMS AFTER COVID-19: ICD-10-CM

## 2022-07-16 RX ORDER — DEXTROAMPHETAMINE SACCHARATE, AMPHETAMINE ASPARTATE, DEXTROAMPHETAMINE SULFATE AND AMPHETAMINE SULFATE 1.25; 1.25; 1.25; 1.25 MG/1; MG/1; MG/1; MG/1
5 TABLET ORAL EVERY EVENING
Qty: 30 TABLET | Refills: 0 | Status: SHIPPED | OUTPATIENT
Start: 2022-07-16 | End: 2022-08-15 | Stop reason: SDUPTHER

## 2022-07-16 RX ORDER — DEXTROAMPHETAMINE SACCHARATE, AMPHETAMINE ASPARTATE MONOHYDRATE, DEXTROAMPHETAMINE SULFATE AND AMPHETAMINE SULFATE 1.25; 1.25; 1.25; 1.25 MG/1; MG/1; MG/1; MG/1
10 CAPSULE, EXTENDED RELEASE ORAL EVERY MORNING
Qty: 60 CAPSULE | Refills: 0 | Status: SHIPPED | OUTPATIENT
Start: 2022-07-16 | End: 2022-08-15 | Stop reason: SDUPTHER

## 2022-07-29 DIAGNOSIS — U09.9 PERSISTENT NEUROLOGIC SYMPTOMS AFTER COVID-19: ICD-10-CM

## 2022-07-29 DIAGNOSIS — U09.9 COVID-19 LONG HAULER: ICD-10-CM

## 2022-07-29 DIAGNOSIS — E07.9 THYROID DISEASE: ICD-10-CM

## 2022-07-29 DIAGNOSIS — R29.90 PERSISTENT NEUROLOGIC SYMPTOMS AFTER COVID-19: ICD-10-CM

## 2022-07-29 DIAGNOSIS — I10 ESSENTIAL HYPERTENSION: ICD-10-CM

## 2022-07-29 RX ORDER — LEVOTHYROXINE SODIUM 0.1 MG/1
TABLET ORAL
Qty: 90 TABLET | Refills: 1 | Status: SHIPPED | OUTPATIENT
Start: 2022-07-29

## 2022-07-29 RX ORDER — MIRTAZAPINE 15 MG/1
TABLET, FILM COATED ORAL
Qty: 90 TABLET | Refills: 1 | Status: SHIPPED | OUTPATIENT
Start: 2022-07-29

## 2022-07-29 RX ORDER — METOPROLOL SUCCINATE 50 MG/1
TABLET, EXTENDED RELEASE ORAL
Qty: 90 TABLET | Refills: 3 | Status: SHIPPED | OUTPATIENT
Start: 2022-07-29

## 2022-08-15 DIAGNOSIS — U09.9 PERSISTENT NEUROLOGIC SYMPTOMS AFTER COVID-19: ICD-10-CM

## 2022-08-15 DIAGNOSIS — U09.9 COVID-19 LONG HAULER: ICD-10-CM

## 2022-08-15 DIAGNOSIS — R29.90 PERSISTENT NEUROLOGIC SYMPTOMS AFTER COVID-19: ICD-10-CM

## 2022-08-15 DIAGNOSIS — R41.840 ATTENTION AND CONCENTRATION DEFICIT: ICD-10-CM

## 2022-08-15 RX ORDER — DEXTROAMPHETAMINE SACCHARATE, AMPHETAMINE ASPARTATE, DEXTROAMPHETAMINE SULFATE AND AMPHETAMINE SULFATE 1.25; 1.25; 1.25; 1.25 MG/1; MG/1; MG/1; MG/1
5 TABLET ORAL EVERY EVENING
Qty: 30 TABLET | Refills: 0 | Status: SHIPPED | OUTPATIENT
Start: 2022-08-15 | End: 2022-09-16 | Stop reason: SDUPTHER

## 2022-08-15 RX ORDER — DEXTROAMPHETAMINE SACCHARATE, AMPHETAMINE ASPARTATE MONOHYDRATE, DEXTROAMPHETAMINE SULFATE AND AMPHETAMINE SULFATE 1.25; 1.25; 1.25; 1.25 MG/1; MG/1; MG/1; MG/1
10 CAPSULE, EXTENDED RELEASE ORAL EVERY MORNING
Qty: 60 CAPSULE | Refills: 0 | Status: SHIPPED | OUTPATIENT
Start: 2022-08-15 | End: 2022-09-16 | Stop reason: SDUPTHER

## 2022-09-16 DIAGNOSIS — U09.9 PERSISTENT NEUROLOGIC SYMPTOMS AFTER COVID-19: ICD-10-CM

## 2022-09-16 DIAGNOSIS — R41.840 ATTENTION AND CONCENTRATION DEFICIT: ICD-10-CM

## 2022-09-16 DIAGNOSIS — U09.9 COVID-19 LONG HAULER: ICD-10-CM

## 2022-09-16 DIAGNOSIS — R29.90 PERSISTENT NEUROLOGIC SYMPTOMS AFTER COVID-19: ICD-10-CM

## 2022-09-16 RX ORDER — DEXTROAMPHETAMINE SACCHARATE, AMPHETAMINE ASPARTATE, DEXTROAMPHETAMINE SULFATE AND AMPHETAMINE SULFATE 1.25; 1.25; 1.25; 1.25 MG/1; MG/1; MG/1; MG/1
5 TABLET ORAL EVERY EVENING
Qty: 30 TABLET | Refills: 0 | Status: SHIPPED | OUTPATIENT
Start: 2022-09-16

## 2022-09-16 RX ORDER — DEXTROAMPHETAMINE SACCHARATE, AMPHETAMINE ASPARTATE MONOHYDRATE, DEXTROAMPHETAMINE SULFATE AND AMPHETAMINE SULFATE 1.25; 1.25; 1.25; 1.25 MG/1; MG/1; MG/1; MG/1
10 CAPSULE, EXTENDED RELEASE ORAL EVERY MORNING
Qty: 60 CAPSULE | Refills: 0 | Status: SHIPPED | OUTPATIENT
Start: 2022-09-16

## 2022-09-30 ENCOUNTER — VBI (OUTPATIENT)
Dept: ADMINISTRATIVE | Facility: OTHER | Age: 42
End: 2022-09-30

## 2022-10-11 PROBLEM — Z13.220 SCREENING FOR HYPERLIPIDEMIA: Status: RESOLVED | Noted: 2022-06-09 | Resolved: 2022-10-11

## 2022-10-11 PROBLEM — Z13.1 SCREENING FOR DIABETES MELLITUS: Status: RESOLVED | Noted: 2022-06-09 | Resolved: 2022-10-11

## 2022-12-05 ENCOUNTER — RA CDI HCC (OUTPATIENT)
Dept: OTHER | Facility: HOSPITAL | Age: 42
End: 2022-12-05

## 2022-12-05 NOTE — PROGRESS NOTES
[facility]  coding opportunities       Chart reviewed, no opportunity found: CHART REVIEWED, NO OPPORTUNITY FOUND        Patients Insurance        Commercial Insurance: [address] [place] [address]

## 2022-12-12 ENCOUNTER — OFFICE VISIT (OUTPATIENT)
Dept: INTERNAL MEDICINE CLINIC | Facility: CLINIC | Age: 42
End: 2022-12-12

## 2022-12-12 VITALS
SYSTOLIC BLOOD PRESSURE: 115 MMHG | HEART RATE: 86 BPM | OXYGEN SATURATION: 99 % | BODY MASS INDEX: 34.58 KG/M2 | DIASTOLIC BLOOD PRESSURE: 76 MMHG | TEMPERATURE: 96.9 F | WEIGHT: 195.2 LBS

## 2022-12-12 DIAGNOSIS — R29.90 PERSISTENT NEUROLOGIC SYMPTOMS AFTER COVID-19: ICD-10-CM

## 2022-12-12 DIAGNOSIS — Z86.16 PERSONAL HISTORY OF COVID-19: ICD-10-CM

## 2022-12-12 DIAGNOSIS — G47.09 OTHER INSOMNIA: ICD-10-CM

## 2022-12-12 DIAGNOSIS — Z00.00 ANNUAL PHYSICAL EXAM: Primary | ICD-10-CM

## 2022-12-12 DIAGNOSIS — E03.8 OTHER SPECIFIED HYPOTHYROIDISM: ICD-10-CM

## 2022-12-12 DIAGNOSIS — R41.840 ATTENTION AND CONCENTRATION DEFICIT: ICD-10-CM

## 2022-12-12 DIAGNOSIS — Z13.1 ENCOUNTER FOR SCREENING FOR DIABETES MELLITUS: ICD-10-CM

## 2022-12-12 DIAGNOSIS — Z12.31 BREAST CANCER SCREENING BY MAMMOGRAM: ICD-10-CM

## 2022-12-12 DIAGNOSIS — Z13.21 ENCOUNTER FOR VITAMIN DEFICIENCY SCREENING: ICD-10-CM

## 2022-12-12 DIAGNOSIS — Z13.220 ENCOUNTER FOR SCREENING FOR LIPOID DISORDERS: ICD-10-CM

## 2022-12-12 DIAGNOSIS — U09.9 PERSISTENT NEUROLOGIC SYMPTOMS AFTER COVID-19: ICD-10-CM

## 2022-12-12 DIAGNOSIS — E66.09 CLASS 1 OBESITY DUE TO EXCESS CALORIES WITHOUT SERIOUS COMORBIDITY WITH BODY MASS INDEX (BMI) OF 34.0 TO 34.9 IN ADULT: ICD-10-CM

## 2022-12-12 DIAGNOSIS — U09.9 COVID-19 LONG HAULER: ICD-10-CM

## 2022-12-12 DIAGNOSIS — I10 ESSENTIAL HYPERTENSION: ICD-10-CM

## 2022-12-12 PROBLEM — E66.811 CLASS 1 OBESITY DUE TO EXCESS CALORIES WITHOUT SERIOUS COMORBIDITY WITH BODY MASS INDEX (BMI) OF 34.0 TO 34.9 IN ADULT: Status: ACTIVE | Noted: 2020-07-08

## 2022-12-12 RX ORDER — DIPHENHYDRAMINE HCL 25 MG
25 TABLET ORAL
COMMUNITY

## 2022-12-12 RX ORDER — CETIRIZINE HYDROCHLORIDE 10 MG/1
10 TABLET ORAL DAILY
COMMUNITY

## 2022-12-12 RX ORDER — FAMOTIDINE 20 MG/1
20 TABLET, FILM COATED ORAL DAILY
COMMUNITY

## 2022-12-12 NOTE — PATIENT INSTRUCTIONS
Problem List Items Addressed This Visit          Endocrine    Other specified hypothyroidism     Continue levothyroxine          Relevant Orders    TSH, 3rd generation    T4, free       Cardiovascular and Mediastinum    Essential hypertension     Continue metoprolol             Other    Class 1 obesity due to excess calories without serious comorbidity with body mass index (BMI) of 34 0 to 34 9 in adult    Personal history of COVID-19    COVID-19 long hauler     Started a mast cell activation diet          Annual physical exam - Primary     Lifestyle modification, diet and exercise discussed  Medications discussed and refilled appropriately  Labs discussed and ordered   Hepatitis C screening discussed  HIV screening discussed  Depression screening performed  Anxiety screening performed  BMI discussed  Mammogram ordered           Relevant Orders    CBC and differential    Comprehensive metabolic panel    Attention and concentration deficit     Currently off this medication and doing great          Persistent neurologic symptoms after COVID-19     Doing significantly better         Breast cancer screening by mammogram    Other insomnia     Discussed cutting the remeron in half and decreasing the dose to eventually discontinue this medication         Encounter for vitamin deficiency screening    Relevant Orders    Vitamin D 25 hydroxy    Encounter for screening for lipoid disorders    Relevant Orders    Lipid panel    Encounter for screening for diabetes mellitus    Relevant Orders    HEMOGLOBIN A1C W/ EAG ESTIMATION

## 2022-12-12 NOTE — ASSESSMENT & PLAN NOTE
• Lifestyle modification, diet and exercise discussed  • Medications discussed and refilled appropriately  • Labs discussed and ordered   • Hepatitis C screening discussed  • HIV screening discussed  • Depression screening performed  • Anxiety screening performed  • BMI discussed  • Mammogram ordered

## 2022-12-12 NOTE — ASSESSMENT & PLAN NOTE
· Discussed cutting the remeron in half and decreasing the dose to eventually discontinue this medication

## 2022-12-12 NOTE — PROGRESS NOTES
[facility]    NAME: Kaela Florence  AGE: 36 y o  SEX: female  : 1980     DATE: 2022     Assessment and Plan:     Problem List Items Addressed This Visit        Endocrine    Other specified hypothyroidism     · Continue levothyroxine          Relevant Orders    TSH, 3rd generation    T4, free       Cardiovascular and Mediastinum    Essential hypertension     · Continue metoprolol             Other    Class 1 obesity due to excess calories without serious comorbidity with body mass index (BMI) of 34 0 to 34 9 in adult    Personal history of COVID-19    COVID-19 long hamaría     · Started a mast cell activation diet          Annual physical exam - Primary     • Lifestyle modification, diet and exercise discussed  • Medications discussed and refilled appropriately  • Labs discussed and ordered   • Hepatitis C screening discussed  • HIV screening discussed  • Depression screening performed  • Anxiety screening performed  • BMI discussed  • Mammogram ordered           Relevant Orders    CBC and differential    Comprehensive metabolic panel    Attention and concentration deficit     · Currently off this medication and doing great          Persistent neurologic symptoms 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-19     · Doing significantly better         Breast cancer screening by mammogram    Other insomnia     · Discussed cutting the remeron in half and decreasing the dose to eventually discontinue this medication         Encounter for vitamin deficiency screening    Relevant Orders    Vitamin D 25 hydroxy    Encounter for screening for lipoid disorders    Relevant Orders    Lipid panel    Encounter for screening for diabetes mellitus    Relevant Orders    HEMOGLOBIN A1C W/ EAG ESTIMATION       Immunizations and preventive care screenings were discussed with patient today   Appropriate education was printed on patient's after visit summary  Counseling:  Alcohol/drug use: discussed moderation in alcohol intake, the recommendations for healthy alcohol use, and avoidance of illicit drug use  Dental Health: discussed importance of regular tooth brushing, flossing, and dental visits  Injury prevention: discussed safety/seat belts, safety helmets, smoke detectors, carbon dioxide detectors, and smoking near bedding or upholstery  Sexual health: discussed sexually transmitted diseases, partner selection, use of condoms, avoidance of unintended pregnancy, and contraceptive alternatives  · Exercise: the importance of regular exercise/physical activity was discussed  Recommend exercise 3-5 times per week for at least 30 minutes  BMI Counseling: Body mass index is 34 58 kg/m²  The BMI is above normal  Nutrition recommendations include decreasing portion sizes, encouraging healthy choices of fruits and vegetables, decreasing fast food intake, consuming healthier snacks, limiting drinks that contain sugar, moderation in carbohydrate intake, increasing intake of lean protein, reducing intake of saturated and trans fat and reducing intake of cholesterol  Exercise recommendations include exercising 3-5 times per week  No pharmacotherapy was ordered  Rationale for BMI follow-up plan is due to patient being overweight or obese  Depression Screening and Follow-up Plan: Patient was screened for depression during today's encounter  They screened negative with a PHQ-2 score of 0  Return in about 1 year (around 12/13/2023) for Annual physical      Chief Complaint:     Chief Complaint   Patient presents with   • Annual Exam     Flu vac declined,  DS, rtn labs       History of Present Illness:     Adult Annual Physical   Patient here for a comprehensive physical exam  The patient reports problems - as discussed   Diet and Physical Activity  · Diet/Nutrition: well balanced diet  · Exercise: 1-2 times a week on average        Depression Screening  PHQ-2/9 Depression Screening    Little interest or pleasure in doing things: 0 - not at all  Feeling down, depressed, or hopeless: 0 - not at all  PHQ-2 Score: 0  PHQ-2 Interpretation: Negative depression screen       General Health  · Sleep: sleeps well  · Hearing: normal - bilateral   · Vision: wears glasses  · Dental: regular dental visits  /GYN Health  · Patient is: Gerhardt Sep · Last menstrual period:   · Contraceptive method:   Gerhardt Sep Review of Systems:     Review of Systems   Constitutional: Negative for activity change, chills, fatigue and fever  HENT: Negative for rhinorrhea and sore throat  Eyes: Negative for pain  Respiratory: Negative for cough and shortness of breath  Cardiovascular: Negative for chest pain, palpitations and leg swelling  Gastrointestinal: Negative for abdominal pain, constipation, diarrhea, nausea and vomiting  Genitourinary: Negative for difficulty urinating, flank pain, frequency and urgency  Musculoskeletal: Negative for gait problem, joint swelling and myalgias  Skin: Negative for color change  Neurological: Negative for dizziness, weakness, light-headedness and headaches  Psychiatric/Behavioral: Negative for sleep disturbance  The patient is not nervous/anxious  All other systems reviewed and are negative       Past Medical History:     Past Medical History:   Diagnosis Date   • Allergic rhinitis       Past Surgical History:     Past Surgical History:   Procedure Laterality Date   •  SECTION      Twice   • HYSTERECTOMY      Total    • OOPHORECTOMY        Social History:     Social History     Socioeconomic History   • Marital status: /Civil Union     Spouse name: None   • Number of children: None   • Years of education: None   • Highest education level: None   Occupational History   • Occupation: EMPLOYED   Tobacco Use   • Smoking status: Former     Packs/day: 0 10     Years: 2 00     Pack years: 0 20     Types: Cigarettes Quit date:      Years since quittin    • Smokeless tobacco: Never   Vaping Use   • Vaping Use: Never used   Substance and Sexual Activity   • Alcohol use:  Yes     Alcohol/week: 1 0 - 2 0 standard drink     Types: 1 - 2 Glasses of wine per week     Comment: socially   • Drug use: No   • Sexual activity: None   Other Topics Concern   • None   Social History Narrative        Employed as a      Has 2 children    Lives at home with spouse and children      Social Determinants of Health     Financial Resource Strain: Not on file   Food Insecurity: Not on file   Transportation Needs: Not on file   Physical Activity: Not on file   Stress: Not on file   Social Connections: Not on file   Intimate Partner Violence: Not on file   Housing Stability: Not on file      Family History:     Family History   Problem Relation Age of Onset   • Muscular dystrophy Mother    • Hypertension Mother    • Hypertension Father    • Asthma Father    • Raynaud syndrome Sister    • Breast cancer Maternal Grandmother    • No Known Problems Paternal Grandmother    • No Known Problems Daughter    • No Known Problems Daughter    • No Known Problems Maternal Aunt    • No Known Problems Paternal Aunt       Current Medications:     Current Outpatient Medications   Medication Sig Dispense Refill   • cetirizine (ZyrTEC) 10 mg tablet Take 10 mg by mouth daily     • diphenhydrAMINE (BENADRYL) 25 mg tablet Take 25 mg by mouth daily at bedtime     • famotidine (PEPCID) 20 mg tablet Take 20 mg by mouth in the morning     • levothyroxine 100 mcg tablet TAKE 1 TABLET BY MOUTH EVERY DAY 90 tablet 1   • metoprolol succinate (TOPROL-XL) 50 mg 24 hr tablet TAKE 1 TABLET BY MOUTH EVERY DAY 90 tablet 3   • mirtazapine (REMERON) 15 mg tablet TAKE 1 TABLET BY MOUTH DAILY AT BEDTIME 90 tablet 1   • Multiple Vitamins-Minerals (MULTIVITAL PO) Take 1 capsule by mouth daily       • ALPRAZolam (XANAX) 0 25 mg tablet Take 1 tablet (0 25 mg total) by mouth 2 (two) times a day as needed for anxiety (Patient not taking: Reported on 12/12/2022) 60 tablet 0     No current facility-administered medications for this visit  Allergies: Allergies   Allergen Reactions   • Nuts - Food Allergy Anaphylaxis     Anaphylaxis   • Amoxicillin Hives   • Penicillins Hives   • Sulfamethoxazole-Trimethoprim Hives      Physical Exam:     /76 (BP Location: Left arm, Patient Position: Sitting, Cuff Size: Standard)   Pulse 86   Temp (!) 96 9 °F (36 1 °C)   Wt 88 5 kg (195 lb 3 2 oz)   SpO2 99%   BMI 34 58 kg/m²     Physical Exam  Vitals and nursing note reviewed  Constitutional:       General: She is awake  She is not in acute distress  Appearance: Normal appearance  She is well-developed and overweight  HENT:      Head: Normocephalic and atraumatic  Nose: Nose normal       Mouth/Throat:      Mouth: Mucous membranes are moist    Eyes:      Conjunctiva/sclera: Conjunctivae normal    Cardiovascular:      Rate and Rhythm: Normal rate and regular rhythm  Pulses: Normal pulses  Heart sounds: Normal heart sounds  No murmur heard  Pulmonary:      Effort: Pulmonary effort is normal  No respiratory distress  Breath sounds: Normal breath sounds  Abdominal:      General: Bowel sounds are normal       Palpations: Abdomen is soft  Tenderness: There is no abdominal tenderness  Musculoskeletal:      Cervical back: Neck supple  Right lower leg: No edema  Left lower leg: No edema  Skin:     General: Skin is warm and dry  Neurological:      Mental Status: She is alert and oriented to person, place, and time  Psychiatric:         Attention and Perception: Attention normal          Mood and Affect: Mood normal          Speech: Speech normal          Behavior: Behavior normal  Behavior is cooperative            GABRIELA Riddle  [facility]

## 2023-01-27 DIAGNOSIS — U09.9 PERSISTENT NEUROLOGIC SYMPTOMS AFTER COVID-19: ICD-10-CM

## 2023-01-27 DIAGNOSIS — U09.9 COVID-19 LONG HAULER: ICD-10-CM

## 2023-01-27 DIAGNOSIS — E07.9 THYROID DISEASE: ICD-10-CM

## 2023-01-27 DIAGNOSIS — R29.90 PERSISTENT NEUROLOGIC SYMPTOMS AFTER COVID-19: ICD-10-CM

## 2023-01-27 RX ORDER — LEVOTHYROXINE SODIUM 0.1 MG/1
TABLET ORAL
Qty: 90 TABLET | Refills: 1 | Status: SHIPPED | OUTPATIENT
Start: 2023-01-27

## 2023-01-27 RX ORDER — MIRTAZAPINE 15 MG/1
TABLET, FILM COATED ORAL
Qty: 90 TABLET | Refills: 1 | Status: SHIPPED | OUTPATIENT
Start: 2023-01-27

## 2023-02-10 PROBLEM — Z13.1 ENCOUNTER FOR SCREENING FOR DIABETES MELLITUS: Status: RESOLVED | Noted: 2022-12-12 | Resolved: 2023-02-10

## 2023-02-10 PROBLEM — Z13.220 ENCOUNTER FOR SCREENING FOR LIPOID DISORDERS: Status: RESOLVED | Noted: 2022-12-12 | Resolved: 2023-02-10

## 2023-03-03 ENCOUNTER — VBI (OUTPATIENT)
Dept: ADMINISTRATIVE | Facility: OTHER | Age: 43
End: 2023-03-03

## 2023-05-12 ENCOUNTER — VBI (OUTPATIENT)
Dept: ADMINISTRATIVE | Facility: OTHER | Age: 43
End: 2023-05-12

## 2023-07-23 DIAGNOSIS — E07.9 THYROID DISEASE: ICD-10-CM

## 2023-07-23 RX ORDER — LEVOTHYROXINE SODIUM 0.1 MG/1
100 TABLET ORAL DAILY
Qty: 30 TABLET | Refills: 0 | Status: SHIPPED | OUTPATIENT
Start: 2023-07-23 | End: 2023-08-29

## 2023-07-24 DIAGNOSIS — I10 ESSENTIAL HYPERTENSION: ICD-10-CM

## 2023-07-25 RX ORDER — METOPROLOL SUCCINATE 50 MG/1
TABLET, EXTENDED RELEASE ORAL
Qty: 90 TABLET | Refills: 3 | Status: SHIPPED | OUTPATIENT
Start: 2023-07-25

## 2023-08-16 ENCOUNTER — VBI (OUTPATIENT)
Dept: ADMINISTRATIVE | Facility: OTHER | Age: 43
End: 2023-08-16

## 2023-08-29 DIAGNOSIS — E07.9 THYROID DISEASE: ICD-10-CM

## 2023-08-29 RX ORDER — LEVOTHYROXINE SODIUM 0.1 MG/1
100 TABLET ORAL DAILY
Qty: 90 TABLET | Refills: 1 | Status: SHIPPED | OUTPATIENT
Start: 2023-08-29

## 2023-11-01 ENCOUNTER — VBI (OUTPATIENT)
Dept: ADMINISTRATIVE | Facility: OTHER | Age: 43
End: 2023-11-01

## 2023-12-13 ENCOUNTER — OFFICE VISIT (OUTPATIENT)
Dept: URGENT CARE | Facility: CLINIC | Age: 43
End: 2023-12-13
Payer: COMMERCIAL

## 2023-12-13 VITALS
HEART RATE: 71 BPM | DIASTOLIC BLOOD PRESSURE: 79 MMHG | OXYGEN SATURATION: 97 % | TEMPERATURE: 97.6 F | RESPIRATION RATE: 18 BRPM | SYSTOLIC BLOOD PRESSURE: 147 MMHG

## 2023-12-13 DIAGNOSIS — J20.9 ACUTE BRONCHITIS, UNSPECIFIED ORGANISM: Primary | ICD-10-CM

## 2023-12-13 PROCEDURE — 99213 OFFICE O/P EST LOW 20 MIN: CPT | Performed by: PHYSICIAN ASSISTANT

## 2023-12-13 RX ORDER — DOXYCYCLINE HYCLATE 100 MG/1
100 CAPSULE ORAL EVERY 12 HOURS SCHEDULED
Qty: 20 CAPSULE | Refills: 0 | Status: SHIPPED | OUTPATIENT
Start: 2023-12-13 | End: 2023-12-23

## 2023-12-13 RX ORDER — BENZONATATE 200 MG/1
200 CAPSULE ORAL 3 TIMES DAILY PRN
Qty: 20 CAPSULE | Refills: 0 | Status: SHIPPED | OUTPATIENT
Start: 2023-12-13

## 2023-12-13 RX ORDER — PREDNISONE 10 MG/1
TABLET ORAL
Qty: 26 TABLET | Refills: 0 | Status: SHIPPED | OUTPATIENT
Start: 2023-12-13

## 2023-12-13 NOTE — PATIENT INSTRUCTIONS
I have prescribed an antibiotic for the infection. Please take the antibiotic as prescribed and finish the entire prescription. I recommend that the patient takes an over the counter probiotic or eats yogurt with live cultures in it [place]) to keep good bacteria in the gut and help prevent diarrhea. Wash hands frequently to prevent the spread of infection. Can use over the counter cough and cold medications to help with symptoms. Ibuprofen and/or tylenol as needed for pain or fever. If not improving over the next 7-10 days, follow up with PCP.       Prednisone as directed

## 2023-12-13 NOTE — PROGRESS NOTES
[facility]    NAME: Phani Vidales is a 39 y.o. female  : 1980    MRN: [id number]  DATE: 2023  TIME: 1:51 PM    Assessment and Plan   Acute bronchitis, unspecified organism [J20.9]  1. Acute bronchitis, unspecified organism  doxycycline hyclate (VIBRAMYCIN) 100 mg capsule    predniSONE 10 mg tablet    benzonatate (TESSALON) 200 MG capsule          Patient Instructions     Patient Instructions   I have prescribed an antibiotic for the infection. Please take the antibiotic as prescribed and finish the entire prescription. I recommend that the patient takes an over the counter probiotic or eats yogurt with live cultures in it [place]) to keep good bacteria in the gut and help prevent diarrhea. Wash hands frequently to prevent the spread of infection. Can use over the counter cough and cold medications to help with symptoms. Ibuprofen and/or tylenol as needed for pain or fever. If not improving over the next 7-10 days, follow up with PCP. Prednisone as directed      Chief Complaint     Chief Complaint   Patient presents with    Cough    Nasal Congestion     Started last Wednesday, post nasal drip, no appetite, pain in lower ribcage from coughing? Online on Saturday prescribed z-pack and steroid, no improvement        History of Present Illness   39year old female here with complaint of cold symptoms worsening symptoms over the past week. Cough is productive. Having a hard time sleeping. At home covid negative    Cough  This is a new problem. The current episode started in the past 7 days. The problem has been gradually worsening. The problem occurs every few minutes. The cough is Non-productive. Associated symptoms include ear congestion, a fever, headaches, myalgias, nasal congestion, postnasal drip, rhinorrhea, a sore throat, shortness of breath, sweats and wheezing. Pertinent negatives include no chills or ear pain.  The symptoms are aggravated by cold air, exercise, lying down and stress. Review of Systems   Review of Systems   Constitutional:  Positive for fever. Negative for appetite change and chills. HENT:  Positive for congestion, postnasal drip, rhinorrhea and sore throat. Negative for ear discharge, ear pain, facial swelling, sinus pressure and sneezing. Respiratory:  Positive for cough, shortness of breath and wheezing. Musculoskeletal:  Positive for myalgias. Neurological:  Positive for headaches.        Current Medications     Current Outpatient Medications:     benzonatate (TESSALON) 200 MG capsule, Take 1 capsule (200 mg total) by mouth 3 (three) times a day as needed for cough, Disp: 20 capsule, Rfl: 0    doxycycline hyclate (VIBRAMYCIN) 100 mg capsule, Take 1 capsule (100 mg total) by mouth every 12 (twelve) hours for 10 days, Disp: 20 capsule, Rfl: 0    predniSONE 10 mg tablet, Take 3 tabs BID X 2 days, 2 tabs BID X 2 days, 1 tab BID X 2 days, 1 tab daily X 2 days, Disp: 26 tablet, Rfl: 0    ALPRAZolam (XANAX) 0.25 mg tablet, Take 1 tablet (0.25 mg total) by mouth 2 (two) times a day as needed for anxiety (Patient not taking: Reported on 12/12/2022), Disp: 60 tablet, Rfl: 0    cetirizine (ZyrTEC) 10 mg tablet, Take 10 mg by mouth daily, Disp: , Rfl:     diphenhydrAMINE (BENADRYL) 25 mg tablet, Take 25 mg by mouth daily at bedtime, Disp: , Rfl:     famotidine (PEPCID) 20 mg tablet, Take 20 mg by mouth in the morning, Disp: , Rfl:     levothyroxine 100 mcg tablet, TAKE 1 TABLET BY MOUTH EVERY DAY, Disp: 90 tablet, Rfl: 1    metoprolol succinate (TOPROL-XL) 50 mg 24 hr tablet, TAKE 1 TABLET BY MOUTH EVERY DAY, Disp: 90 tablet, Rfl: 3    mirtazapine (REMERON) 15 mg tablet, TAKE 1 TABLET BY MOUTH EVERYDAY AT BEDTIME, Disp: 90 tablet, Rfl: 1    Multiple Vitamins-Minerals (MULTIVITAL PO), Take 1 capsule by mouth daily  , Disp: , Rfl:     Current Allergies     Allergies as of 12/13/2023 - Reviewed 12/13/2023   Allergen Reaction Noted    Nuts - food allergy Anaphylaxis     Amoxicillin Hives     Penicillins Hives     Sulfamethoxazole-trimethoprim Hives           The following portions of the patient's history were reviewed and updated as appropriate: allergies, current medications, past family history, past medical history, past social history, past surgical history and problem list.   Past Medical History:   Diagnosis Date    Allergic rhinitis      Past Surgical History:   Procedure Laterality Date     SECTION      Twice    HYSTERECTOMY      Total     OOPHORECTOMY       Family History   Problem Relation Age of Onset    Muscular dystrophy Mother     Hypertension Mother     Hypertension Father     Asthma Father     Raynaud syndrome Sister     Breast cancer Maternal Grandmother     No Known Problems Paternal Grandmother     No Known Problems Daughter     No Known Problems Daughter     No Known Problems Maternal Aunt     No Known Problems Paternal Aunt      Social History     Socioeconomic History    Marital status: /Civil Union     Spouse name: Not on file    Number of children: Not on file    Years of education: Not on file    Highest education level: Not on file   Occupational History    Occupation: EMPLOYED   Tobacco Use    Smoking status: Former     Current packs/day: 0.00     Average packs/day: 0.1 packs/day for 2.0 years (0.2 ttl pk-yrs)     Types: Cigarettes     Start date:      Quit date:      Years since quittin.9    Smokeless tobacco: Never   Vaping Use    Vaping status: Never Used   Substance and Sexual Activity    Alcohol use:  Yes     Alcohol/week: 1.0 - 2.0 standard drink of alcohol     Types: 1 - 2 Glasses of wine per week     Comment: socially    Drug use: No    Sexual activity: Not on file   Other Topics Concern    Not on file   Social History Narrative        Employed as a      Has 2 children    Lives at home with spouse and children      Social Determinants of Health     Financial Resource Strain: Not on file Food Insecurity: Not on file   Transportation Needs: Not on file   Physical Activity: Not on file   Stress: Not on file   Social Connections: Not on file   Intimate Partner Violence: Not on file   Housing Stability: Not on file     Medications have been verified. Objective   /79   Pulse 71   Temp 97.6 °F (36.4 °C)   Resp 18   SpO2 97%      Physical Exam   Physical Exam  Vitals and nursing note reviewed. Constitutional:       General: She is not in acute distress. Appearance: She is well-developed. HENT:      Head: Normocephalic and atraumatic. Right Ear: Tympanic membrane normal.      Left Ear: Tympanic membrane normal.      Nose: Nose normal. No mucosal edema or rhinorrhea. Right Sinus: No maxillary sinus tenderness or frontal sinus tenderness. Left Sinus: No maxillary sinus tenderness or frontal sinus tenderness. Mouth/Throat:      Pharynx: Posterior oropharyngeal erythema present. No oropharyngeal exudate. Eyes:      Conjunctiva/sclera: Conjunctivae normal.   Cardiovascular:      Rate and Rhythm: Normal rate and regular rhythm. Heart sounds: Normal heart sounds. No murmur heard. Pulmonary:      Effort: Pulmonary effort is normal.      Breath sounds: Wheezing present.

## 2023-12-21 ENCOUNTER — OFFICE VISIT (OUTPATIENT)
Dept: INTERNAL MEDICINE CLINIC | Facility: CLINIC | Age: 43
End: 2023-12-21
Payer: COMMERCIAL

## 2023-12-21 VITALS
WEIGHT: 205 LBS | OXYGEN SATURATION: 99 % | BODY MASS INDEX: 34.16 KG/M2 | HEIGHT: 65 IN | TEMPERATURE: 96.9 F | HEART RATE: 65 BPM | SYSTOLIC BLOOD PRESSURE: 130 MMHG | DIASTOLIC BLOOD PRESSURE: 68 MMHG

## 2023-12-21 DIAGNOSIS — E03.8 OTHER SPECIFIED HYPOTHYROIDISM: ICD-10-CM

## 2023-12-21 DIAGNOSIS — Z00.00 ANNUAL PHYSICAL EXAM: Primary | ICD-10-CM

## 2023-12-21 DIAGNOSIS — Z13.220 SCREENING FOR HYPERLIPIDEMIA: ICD-10-CM

## 2023-12-21 DIAGNOSIS — Z13.1 SCREENING FOR DIABETES MELLITUS: ICD-10-CM

## 2023-12-21 DIAGNOSIS — Z12.31 BREAST CANCER SCREENING BY MAMMOGRAM: ICD-10-CM

## 2023-12-21 DIAGNOSIS — Z12.31 ENCOUNTER FOR SCREENING MAMMOGRAM FOR BREAST CANCER: ICD-10-CM

## 2023-12-21 DIAGNOSIS — E55.9 VITAMIN D DEFICIENCY: ICD-10-CM

## 2023-12-21 DIAGNOSIS — R07.81 RIB PAIN: ICD-10-CM

## 2023-12-21 DIAGNOSIS — I10 ESSENTIAL HYPERTENSION: ICD-10-CM

## 2023-12-21 DIAGNOSIS — Z11.59 NEED FOR HEPATITIS C SCREENING TEST: ICD-10-CM

## 2023-12-21 DIAGNOSIS — E66.09 CLASS 1 OBESITY DUE TO EXCESS CALORIES WITHOUT SERIOUS COMORBIDITY WITH BODY MASS INDEX (BMI) OF 34.0 TO 34.9 IN ADULT: ICD-10-CM

## 2023-12-21 PROBLEM — Z23 ENCOUNTER FOR IMMUNIZATION: Status: ACTIVE | Noted: 2023-12-21

## 2023-12-21 PROBLEM — K21.9 GASTROESOPHAGEAL REFLUX DISEASE WITHOUT ESOPHAGITIS: Status: ACTIVE | Noted: 2023-12-21

## 2023-12-21 PROCEDURE — 99396 PREV VISIT EST AGE 40-64: CPT | Performed by: NURSE PRACTITIONER

## 2023-12-21 RX ORDER — METHOCARBAMOL 500 MG/1
500 TABLET, FILM COATED ORAL 4 TIMES DAILY PRN
Qty: 120 TABLET | Refills: 0 | Status: SHIPPED | OUTPATIENT
Start: 2023-12-21

## 2023-12-21 RX ORDER — BIOTIN 10 MG
TABLET ORAL DAILY
COMMUNITY

## 2023-12-21 NOTE — PROGRESS NOTES
ADULT ANNUAL PHYSICAL  [facility]    NAME: Mya Nelson  AGE: 43 y.o. SEX: female  : 1980     DATE: 2023     Assessment and Plan:     Problem List Items Addressed This Visit        Endocrine    Other specified hypothyroidism     Will check labs  Continue levothyroxine         Relevant Orders    TSH, 3rd generation with Free T4 reflex       Cardiovascular and Mediastinum    Essential hypertension     Continue metoprolol succinate            Other    Class 1 obesity due to excess calories without serious comorbidity with body mass index (BMI) of 34.0 to 34.9 in adult     Lifestyle modification, diet and exercise discussed         Annual physical exam - Primary     Lifestyle modification, diet and exercise discussed  Medications discussed and refilled appropriately  Labs discussed and ordered   Hepatitis C screening discussed  Depression screening performed  Anxiety screening performed  Urinary Incontinence screening performed   BMI discussed  Mammogram ordered           Relevant Orders    CBC and Platelet    Comprehensive metabolic panel    Breast cancer screening by mammogram    Screening for diabetes mellitus    Relevant Orders    Hemoglobin A1C    Screening for hyperlipidemia    Relevant Orders    Lipid panel    Need for hepatitis C screening test    Relevant Orders    Hepatitis C Antibody    Encounter for screening mammogram for breast cancer    Relevant Orders    Mammo screening bilateral w 3d & cad    Vitamin D deficiency     Will check lab         Relevant Orders    Vitamin D 25 hydroxy    Rib pain     2023  After having bronchitis  Will give her robaxin   Will give her an ACE Wrap to help with her pain which is making it hard for her to take deep breaths         Relevant Medications    methocarbamol (ROBAXIN) 500 mg tablet       Immunizations and preventive care screenings were discussed with patient today. Appropriate education  was printed on patient's after visit summary.    Counseling:  Alcohol/drug use: discussed moderation in alcohol intake, the recommendations for healthy alcohol use, and avoidance of illicit drug use.  Dental Health: discussed importance of regular tooth brushing, flossing, and dental visits.  Injury prevention: discussed safety/seat belts, safety helmets, smoke detectors, carbon dioxide detectors, and smoking near bedding or upholstery.  Sexual health: discussed sexually transmitted diseases, partner selection, use of condoms, avoidance of unintended pregnancy, and contraceptive alternatives.  Exercise: the importance of regular exercise/physical activity was discussed. Recommend exercise 3-5 times per week for at least 30 minutes.     BMI Counseling: Body mass index is 34.64 kg/m². The BMI is above normal. Nutrition recommendations include decreasing portion sizes, encouraging healthy choices of fruits and vegetables, decreasing fast food intake, consuming healthier snacks, limiting drinks that contain sugar, moderation in carbohydrate intake, increasing intake of lean protein, reducing intake of saturated and trans fat and reducing intake of cholesterol. Exercise recommendations include exercising 3-5 times per week. No pharmacotherapy was ordered. Rationale for BMI follow-up plan is due to patient being overweight or obese.     Depression Screening and Follow-up Plan: Patient was screened for depression during today's encounter. They screened negative with a PHQ-2 score of 0.        Return in about 1 year (around 12/21/2024) for Annual physical.     Chief Complaint:     Chief Complaint   Patient presents with   • Physical Exam     Annual, rib pain , coughing a lot over the last 3 weeks       History of Present Illness:     Adult Annual Physical   Patient here for a comprehensive physical exam. The patient reports problems - as discussed .    Diet and Physical Activity  Diet/Nutrition: well balanced diet.    Exercise: 3-4 times a week on average.      Depression Screening  PHQ-2/9 Depression Screening    Little interest or pleasure in doing things: 0 - not at all  Feeling down, depressed, or hopeless: 0 - not at all  Trouble falling or staying asleep, or sleeping too much: 0 - not at all  Feeling tired or having little energy: 0 - not at all  Poor appetite or overeatin - not at all  Feeling bad about yourself - or that you are a failure or have let yourself or your family down: 0 - not at all  Trouble concentrating on things, such as reading the newspaper or watching television: 0 - not at all  Moving or speaking so slowly that other people could have noticed. Or the opposite - being so fidgety or restless that you have been moving around a lot more than usual: 0 - not at all  Thoughts that you would be better off dead, or of hurting yourself in some way: 0 - not at all  PHQ-2 Score: 0  PHQ-2 Interpretation: Negative depression screen  PHQ-9 Score: 0   PHQ-9 Interpretation: No or Minimal depression        General Health  Sleep: sleeps well.   Hearing: normal - bilateral.  Vision: no vision problems.   Dental: regular dental visits.       /GYN Health  Follows with gynecology? yes   Patient is: .  Last menstrual period: .  Contraceptive method:  . .    Advanced Care Planning  Do you have an advanced directive? no  Do you have a durable medical power of ? no     Review of Systems:     Review of Systems   Constitutional:  Negative for activity change, chills, fatigue and fever.   HENT:  Negative for rhinorrhea and sore throat.    Eyes:  Negative for pain.   Respiratory:  Positive for cough. Negative for shortness of breath.    Cardiovascular:  Negative for chest pain, palpitations and leg swelling.   Gastrointestinal:  Negative for abdominal pain, constipation, diarrhea, nausea and vomiting.   Genitourinary:  Negative for difficulty urinating, flank pain, frequency and urgency.   Musculoskeletal:  Negative  for gait problem, joint swelling and myalgias.   Skin:  Negative for color change.   Neurological:  Negative for dizziness, weakness, light-headedness and headaches.   Psychiatric/Behavioral:  Negative for sleep disturbance. The patient is not nervous/anxious.    All other systems reviewed and are negative.     Past Medical History:     Past Medical History:   Diagnosis Date   • Allergic     Seasonal   • Allergic rhinitis    • Disease of thyroid gland    • Hypertension 10 years ago    Managed with metoprolol      Past Surgical History:     Past Surgical History:   Procedure Laterality Date   •  SECTION      Twice   • HYSTERECTOMY      Total    • OOPHORECTOMY        Social History:     Social History     Socioeconomic History   • Marital status: /Civil Union     Spouse name: None   • Number of children: None   • Years of education: None   • Highest education level: None   Occupational History   • Occupation: EMPLOYED   Tobacco Use   • Smoking status: Former     Current packs/day: 0.00     Average packs/day: 0.1 packs/day for 2.0 years (0.2 ttl pk-yrs)     Types: Cigarettes     Start date:      Quit date:      Years since quittin.9   • Smokeless tobacco: Never   • Tobacco comments:     Occaisional 2 cigs per day while in college   Vaping Use   • Vaping status: Never Used   Substance and Sexual Activity   • Alcohol use: Yes     Alcohol/week: 1.0 - 2.0 standard drink of alcohol     Types: 1 - 2 Glasses of wine per week     Comment: socially   • Drug use: No   • Sexual activity: Yes     Birth control/protection: Post-menopausal   Other Topics Concern   • None   Social History Narrative        Employed as a      Has 2 children    Lives at home with spouse and children      Social Determinants of Health     Financial Resource Strain: Not on file   Food Insecurity: Not on file   Transportation Needs: Not on file   Physical Activity: Not on file   Stress: Not on file   Social  Connections: Not on file   Intimate Partner Violence: Not on file   Housing Stability: Not on file      Family History:     Family History   Problem Relation Age of Onset   • Muscular dystrophy Mother    • Hypertension Mother    • Hypertension Father    • Asthma Father    • Cancer Father         Prostate cancer   • Raynaud syndrome Sister    • Breast cancer Maternal Grandmother    • Diabetes Maternal Grandmother    • Cancer Maternal Grandmother         Breast cancer   • Vision loss Maternal Grandmother         Macular degeneration   • No Known Problems Paternal Grandmother    • No Known Problems Daughter    • No Known Problems Daughter    • No Known Problems Maternal Aunt    • No Known Problems Paternal Aunt       Current Medications:     Current Outpatient Medications   Medication Sig Dispense Refill   • benzonatate (TESSALON) 200 MG capsule Take 1 capsule (200 mg total) by mouth 3 (three) times a day as needed for cough 20 capsule 0   • Bromelains 500 MG TABS Take by mouth daily     • cetirizine (ZyrTEC) 10 mg tablet Take 10 mg by mouth daily     • doxycycline hyclate (VIBRAMYCIN) 100 mg capsule Take 1 capsule (100 mg total) by mouth every 12 (twelve) hours for 10 days 20 capsule 0   • levothyroxine 100 mcg tablet TAKE 1 TABLET BY MOUTH EVERY DAY 90 tablet 1   • methocarbamol (ROBAXIN) 500 mg tablet Take 1 tablet (500 mg total) by mouth 4 (four) times a day as needed for muscle spasms 120 tablet 0   • metoprolol succinate (TOPROL-XL) 50 mg 24 hr tablet TAKE 1 TABLET BY MOUTH EVERY DAY 90 tablet 3   • Multiple Vitamins-Minerals (MULTIVITAL PO) Take 1 capsule by mouth daily       • NATTOKINASE PO Take by mouth 2 (two) times a day     • predniSONE 10 mg tablet Take 3 tabs BID X 2 days, 2 tabs BID X 2 days, 1 tab BID X 2 days, 1 tab daily X 2 days 26 tablet 0   • Turmeric Curcumin 500 MG CAPS Take 500 mg by mouth 2 (two) times a day       No current facility-administered medications for this visit.      Allergies:  "    Allergies   Allergen Reactions   • Nuts - Food Allergy Anaphylaxis     Anaphylaxis   • Amoxicillin Hives   • Penicillins Hives   • Sulfamethoxazole-Trimethoprim Hives      Physical Exam:     /68 (BP Location: Left arm, Patient Position: Sitting, Cuff Size: Standard)   Pulse 65   Temp (!) 96.9 °F (36.1 °C) (Tympanic)   Ht 5' 4.5\" (1.638 m)   Wt 93 kg (205 lb)   SpO2 99%   BMI 34.64 kg/m²     Physical Exam  Vitals and nursing note reviewed.   Constitutional:       General: She is awake. She is not in acute distress.     Appearance: Normal appearance. She is well-developed and overweight.   HENT:      Head: Normocephalic and atraumatic.      Nose: Nose normal.      Mouth/Throat:      Mouth: Mucous membranes are moist.   Eyes:      Conjunctiva/sclera: Conjunctivae normal.   Cardiovascular:      Rate and Rhythm: Normal rate and regular rhythm.      Pulses: Normal pulses.      Heart sounds: Normal heart sounds. No murmur heard.  Pulmonary:      Effort: Pulmonary effort is normal. No respiratory distress.      Breath sounds: Normal breath sounds.      Comments: Bilateral rib pain secondary to cough from recent bronchitis.   Abdominal:      General: Bowel sounds are normal.      Palpations: Abdomen is soft.      Tenderness: There is no abdominal tenderness.   Musculoskeletal:      Cervical back: Neck supple.      Right lower leg: No edema.      Left lower leg: No edema.   Skin:     General: Skin is warm and dry.   Neurological:      Mental Status: She is alert and oriented to person, place, and time.   Psychiatric:         Attention and Perception: Attention normal.         Mood and Affect: Mood normal.         Speech: Speech normal.         Behavior: Behavior normal. Behavior is cooperative.          GABRIELA Hughes  [facility]    "

## 2023-12-21 NOTE — PATIENT INSTRUCTIONS
Wellness Visit for Adults   AMBULATORY CARE:   A wellness visit  is when you see your healthcare provider to get screened for health problems. Your healthcare provider will also give you advice on how to stay healthy. Write down your questions so you remember to ask them. Ask your healthcare provider how often you should have a wellness visit.  What happens at a wellness visit:  Your healthcare provider will ask about your health, and your family history of health problems. This includes high blood pressure, heart disease, and cancer. He or she will ask if you have symptoms that concern you, if you smoke, and about your mood. You may also be asked about your intake of medicines, supplements, food, and alcohol. Any of the following may be done:  Your weight  will be checked. Your height may also be checked so your body mass index (BMI) can be calculated. Your BMI shows if you are at a healthy weight.    Your blood pressure  and heart rate will be checked. Your temperature may also be checked.    Blood and urine tests  may be done. Blood tests may be done to check your cholesterol levels. Abnormal cholesterol levels increase your risk for heart disease and stroke. You may also need a blood or urine test to check for diabetes if you are at increased risk. Urine tests may be done to look for signs of an infection or kidney disease.    A physical exam  includes checking your heartbeat and lungs with a stethoscope. Your healthcare provider may also check your skin to look for sun damage.    Screening tests  may be recommended. A screening test is done to check for diseases that may not cause symptoms. The screening tests you may need depend on your age, gender, family history, and lifestyle habits. For example, colorectal screening may be recommended if you are 50 years old or older.    Screening tests you need if you are a woman:   A Pap smear  is used to screen for cervical cancer. Pap smears are usually done every 3 to  5 years depending on your age. You may need them more often if you have had abnormal Pap smear test results in the past. Ask your healthcare provider how often you should have a Pap smear.    A mammogram  is an x-ray of your breasts to screen for breast cancer. Experts recommend mammograms every 2 years starting at age 50 years. You may need a mammogram at age 49 years or younger if you have an increased risk for breast cancer. Talk to your healthcare provider about when you should start having mammograms and how often you need them.    Vaccines you may need:   Get an influenza vaccine  every year. The influenza vaccine protects you from the flu. Several types of viruses cause the flu. The viruses change over time, so new vaccines are made each year.    Get a tetanus-diphtheria (Td) booster vaccine  every 10 years. This vaccine protects you against tetanus and diphtheria. Tetanus is a severe infection that may cause painful muscle spasms and lockjaw. Diphtheria is a severe bacterial infection that causes a thick covering in the back of your mouth and throat.    Get a human papillomavirus (HPV) vaccine  if you are female and aged 19 to 26 or male 19 to 21 and never received it. This vaccine protects you from HPV infection. HPV is the most common infection spread by sexual contact. HPV may also cause vaginal, penile, and anal cancers.    Get a pneumococcal vaccine  if you are aged 65 years or older. The pneumococcal vaccine is an injection given to protect you from pneumococcal disease. Pneumococcal disease is an infection caused by pneumococcal bacteria. The infection may cause pneumonia, meningitis, or an ear infection.    Get a shingles vaccine  if you are 60 or older, even if you have had shingles before. The shingles vaccine is an injection to protect you from the varicella-zoster virus. This is the same virus that causes chickenpox. Shingles is a painful rash that develops in people who had chickenpox or have  been exposed to the virus.    How to eat healthy:  My Plate is a model for planning healthy meals. It shows the types and amounts of foods that should go on your plate. Fruits and vegetables make up about half of your plate, and grains and protein make up the other half. A serving of dairy is included on the side of your plate. The amount of calories and serving sizes you need depends on your age, gender, weight, and height. Examples of healthy foods are listed below:  Eat a variety of vegetables  such as dark green, red, and orange vegetables. You can also include canned vegetables low in sodium (salt) and frozen vegetables without added butter or sauces.    Eat a variety of fresh fruits , canned fruit in 100% juice, frozen fruit, and dried fruit.    Include whole grains.  At least half of the grains you eat should be whole grains. Examples include whole-wheat bread, wheat pasta, brown rice, and whole-grain cereals such as oatmeal.    Eat a variety of protein foods such as seafood (fish and shellfish), lean meat, and poultry without skin (turkey and chicken). Examples of lean meats include pork leg, shoulder, or tenderloin, and beef round, sirloin, tenderloin, and extra lean ground beef. Other protein foods include eggs and egg substitutes, beans, peas, soy products, nuts, and seeds.    Choose low-fat dairy products such as skim or 1% milk or low-fat yogurt, cheese, and cottage cheese.    Limit unhealthy fats  such as butter, hard margarine, and shortening.       Exercise:  Exercise at least 30 minutes per day on most days of the week. Some examples of exercise include walking, biking, dancing, and swimming. You can also fit in more physical activity by taking the stairs instead of the elevator or parking farther away from stores. Include muscle strengthening activities 2 days each week. Regular exercise provides many health benefits. It helps you manage your weight, and decreases your risk for type 2 diabetes,  heart disease, stroke, and high blood pressure. Exercise can also help improve your mood. Ask your healthcare provider about the best exercise plan for you.       General health and safety guidelines:   Do not smoke.  Nicotine and other chemicals in cigarettes and cigars can cause lung damage. Ask your healthcare provider for information if you currently smoke and need help to quit. E-cigarettes or smokeless tobacco still contain nicotine. Talk to your healthcare provider before you use these products.    Limit alcohol.  A drink of alcohol is 12 ounces of beer, 5 ounces of wine, or 1½ ounces of liquor.    Lose weight, if needed.  Being overweight increases your risk of certain health conditions. These include heart disease, high blood pressure, type 2 diabetes, and certain types of cancer.    Protect your skin.  Do not sunbathe or use tanning beds. Use sunscreen with a SPF 15 or higher. Apply sunscreen at least 15 minutes before you go outside. Reapply sunscreen every 2 hours. Wear protective clothing, hats, and sunglasses when you are outside.    Drive safely.  Always wear your seatbelt. Make sure everyone in your car wears a seatbelt. A seatbelt can save your life if you are in an accident. Do not use your cell phone when you are driving. This could distract you and cause an accident. Pull over if you need to make a call or send a text message.    Practice safe sex.  Use latex condoms if are sexually active and have more than one partner. Your healthcare provider may recommend screening tests for sexually transmitted infections (STIs).    Wear helmets, lifejackets, and protective gear.  Always wear a helmet when you ride a bike or motorcycle, go skiing, or play sports that could cause a head injury. Wear protective equipment when you play sports. Wear a lifejacket when you are on a boat or doing water sports.    © Copyright [employer] 2023 Information is for End User's use only and may not be sold, redistributed or  otherwise used for commercial purposes.  The above information is an  only. It is not intended as medical advice for individual conditions or treatments. Talk to your doctor, nurse or pharmacist before following any medical regimen to see if it is safe and effective for you.    Cholesterol and Your Health   AMBULATORY CARE:   Cholesterol  is a waxy, fat-like substance. Your body uses cholesterol to make hormones and new cells, and to protect nerves. Cholesterol is made by your body. It also comes from certain foods you eat, such as meat and dairy products. Your healthcare provider can help you set goals for your cholesterol levels. Your provider can help you create a plan to meet your goals.  Cholesterol level goals:  Your cholesterol level goals depend on your risk for heart disease, your age, and your other health conditions. The following are general guidelines:  Total cholesterol  includes low-density lipoprotein (LDL), high-density lipoprotein (HDL), and triglyceride levels. The total cholesterol level should be lower than 200 mg/dL and is best at about 150 mg/dL.    LDL cholesterol  is called bad cholesterol  because it forms plaque in your arteries. As plaque builds up, your arteries become narrow, and less blood flows through. When plaque decreases blood flow to your heart, you may have chest pain. If plaque completely blocks an artery that brings blood to your heart, you may have a heart attack. Plaque can break off and form blood clots. Blood clots may block arteries in your brain and cause a stroke. The level should be less than 130 mg/dL and is best at about 100 mg/dL.         HDL cholesterol  is called good cholesterol  because it helps remove LDL cholesterol from your arteries. It does this by attaching to LDL cholesterol and carrying it to your liver. Your liver breaks down LDL cholesterol so your body can get rid of it. High levels of HDL cholesterol can help prevent a heart attack and  stroke. Low levels of HDL cholesterol can increase your risk for heart disease, heart attack, and stroke. The level should be at least 40 mg/dL in males or at least 50 mg/dL in females.    Triglycerides  are a type of fat that store energy from foods you eat. High levels of triglycerides also cause plaque buildup. This can increase your risk for a heart attack or stroke. If your triglyceride level is high, your LDL cholesterol level may also be high. The level should be less than 150 mg/dL.    Any of the following can increase your risk for high cholesterol:   Smoking or drinking large amounts of alcohol    Having overweight or obesity, or not getting enough exercise    A medical condition such as hypertension (high blood pressure) or diabetes    A family history of high cholesterol    Age older than 65    What you need to know about having your cholesterol levels checked:  Adults 20 to 45 years of age should have their cholesterol levels checked every 4 to 6 years. Adults 45 years or older should have their cholesterol checked every 1 to 2 years. You may need your cholesterol checked more often, or at a younger age, if you have risk factors for heart disease. You may also need to have your cholesterol checked more often if you have other health conditions, such as diabetes. Blood tests are used to check cholesterol levels. Blood tests measure your levels of triglycerides, LDL cholesterol, and HDL cholesterol.  How healthy fats affect your cholesterol levels:  Healthy fats, also called unsaturated fats, help lower LDL cholesterol and triglyceride levels. Healthy fats include the following:  Monounsaturated fats  are found in foods such as olive oil, canola oil, avocado, nuts, and olives.    Polyunsaturated fats,  such as omega 3 fats, are found in fish, such as salmon, trout, and tuna. They can also be found in plant foods such as flaxseed, walnuts, and soybeans.    How unhealthy fats affect your cholesterol levels:   Unhealthy fats increase LDL cholesterol and triglyceride levels. They are found in foods high in cholesterol, saturated fat, and trans fat:  Cholesterol  is found in eggs, dairy, and meat.    Saturated fat  is found in butter, cheese, ice cream, whole milk, and coconut oil. Saturated fat is also found in meat, such as sausage, hot dogs, and bologna.    Trans fat  is found in liquid oils and is used in fried and baked foods. Foods that contain trans fats include chips, crackers, muffins, sweet rolls, microwave popcorn, and cookies.    Treatment  for high cholesterol will also decrease your risk of heart disease, heart attack, and stroke. Treatment may include any of the following:  Lifestyle changes  may include food, exercise, weight loss, and quitting smoking. You may also need to decrease the amount of alcohol you drink. Your healthcare provider will want you to start with lifestyle changes. Other treatment may be added if lifestyle changes are not enough. Your healthcare provider may recommend you work with a team to manage hyperlipidemia. The team may include medical experts such as a dietitian, an exercise or physical therapist, and a behavior therapist. Your family members may be included in helping you create lifestyle changes.    Medicines  may be given to lower your LDL cholesterol, triglyceride levels, or total cholesterol level. You may need medicines to lower your cholesterol if any of the following is true:    You have a history of stroke, TIA, unstable angina, or a heart attack.    Your LDL cholesterol level is 190 mg/dL or higher.    You are age 40 to 75 years, have diabetes or heart disease risk factors, and your LDL cholesterol is 70 mg/dL or higher.    Supplements  include fish oil, red yeast rice, and garlic. Fish oil may help lower your triglyceride and LDL cholesterol levels. It may also increase your HDL cholesterol level. Red yeast rice may help decrease your total cholesterol level and LDL  cholesterol level. Garlic may help lower your total cholesterol level. Do not take any supplements without talking to your healthcare provider.    Food changes you can make to lower your cholesterol levels:  A dietitian can help you create a healthy eating plan. Your dietitian can show you how to read food labels and choose foods low in saturated fat, trans fats, and cholesterol.     Decrease the total amount of fat you eat.  Choose lean meats, fat-free or 1% fat milk, and low-fat dairy products, such as yogurt and cheese. Try to limit or avoid red meats. Limit or do not eat fried foods or baked goods, such as cookies.    Replace unhealthy fats with healthy fats.  Cook foods in olive oil or canola oil. Choose soft margarines that are low in saturated fat and trans fat. Seeds, nuts, and avocados are other examples of healthy fats.    Eat foods with omega-3 fats.  Examples include salmon, tuna, mackerel, walnuts, and flaxseed. Eat fish 2 times per week. Pregnant women should not eat fish that have high levels of mercury, such as shark, swordfish, and nelson mackerel.         Increase the amount of high-fiber foods you eat.  High-fiber foods can help lower your LDL cholesterol. Aim to get between 20 and 30 grams of fiber each day. Fruits and vegetables are high in fiber. Eat at least 5 servings each day. Other high-fiber foods are whole-grain or whole-wheat breads, pastas, or cereals, and brown rice. Eat 3 ounces of whole-grain foods each day. Increase fiber slowly. You may have abdominal discomfort, bloating, and gas if you add fiber to your diet too quickly.         Eat healthy protein foods.  Examples include low-fat dairy products, skinless chicken and turkey, fish, and nuts.    Limit foods and drinks that are high in sugar.  Your dietitian or healthcare provider can help you create daily limits for high-sugar foods and drinks. The limit may be lower if you have diabetes or another health condition. Limits can also  help you lose weight if needed.  Lifestyle changes you can make to lower your cholesterol levels:   Maintain a healthy weight.  Ask your healthcare provider what a healthy weight is for you. Ask your provider to help you create a weight loss plan if needed. Weight loss can decrease your total cholesterol and triglyceride levels. Weight loss may also help keep your blood pressure at a healthy level.    Be physically active throughout the day.  Physical activity, such as exercise, can help lower your total cholesterol level and maintain a healthy weight. Physical activity can also help increase your HDL cholesterol level. Work with your healthcare provider to create an program that is right for you. Get at least 30 to 40 minutes of moderate physical activity most days of the week. Examples of exercise include brisk walking, swimming, or biking. Also include strength training at least 2 times each week. Your healthcare providers can help you create a physical activity plan.            Do not smoke.  Nicotine and other chemicals in cigarettes and cigars can raise your cholesterol levels. Ask your healthcare provider for information if you currently smoke and need help to quit. E-cigarettes or smokeless tobacco still contain nicotine. Talk to your healthcare provider before you use these products.         Limit or do not drink alcohol.  Alcohol can increase your triglyceride levels. Ask your healthcare provider before you drink alcohol. Ask how much is okay for you to drink in 24 hours or 1 week.    Follow up with your doctor as directed:  Write down your questions so you remember to ask them during your visits.  © Copyright [employer] 2023 Information is for End User's use only and may not be sold, redistributed or otherwise used for commercial purposes.  The above information is an  only. It is not intended as medical advice for individual conditions or treatments. Talk to your doctor, nurse or pharmacist  before following any medical regimen to see if it is safe and effective for you.

## 2023-12-21 NOTE — ASSESSMENT & PLAN NOTE
Lifestyle modification, diet and exercise discussed  Medications discussed and refilled appropriately  Labs discussed and ordered   Hepatitis C screening discussed  Depression screening performed  Anxiety screening performed  Urinary Incontinence screening performed   BMI discussed  Mammogram ordered

## 2023-12-21 NOTE — ASSESSMENT & PLAN NOTE
12/21/2023  After having bronchitis  Will give her robaxin   Will give her an ACE Wrap to help with her pain which is making it hard for her to take deep breaths

## 2023-12-22 NOTE — PROGRESS NOTES
Reason For Visit  Chief Complaint   Patient presents with    Hospital F/U     [facility] follow up- post LHC-pt denies chest pain, SOB, or leg edema            History of Present Illness  No complaints doing well today I reviewed the angiogram, Blood pressure heart rate stable  Continue the statin as prescribed.  Will return to cardiology in 2 years and follow-up.   Visit Vitals  /66 (BP Location: LUE - Left upper extremity, Patient Position: Sitting)   Pulse 62   Ht 5' 3\" (1.6 m)   Wt 67 kg (147 lb 11.3 oz)   BMI 26.17 kg/m²         Physical Exam  Physical Exam   Constitutional: No distress.   HENT: Tympanic membranes normal.   Eyes: Pupils are equal, round, and reactive to light.   Neck: Thyroid normal.   Cardiovascular: Normal rate, regular rhythm, S1 normal and S2 normal.   No murmur heard.  Pulses:       Carotid pulses are 0 on the right side and 0 on the left side.       Radial pulses are 2+ on the right side and 2+ on the left side.         Right popliteal pulse not accessible and left popliteal pulse not accessible.         Right dorsalis pedis pulse not accessible and left dorsalis pedis pulse not accessible.         Right posterior tibial pulse not accessible and left posterior tibial pulse not accessible.   Pulmonary/Chest: Breath sounds normal. He has no wheezes. He has no rales. He exhibits no tenderness.   Abdominal: Soft. Bowel sounds are normal.   Musculoskeletal:         General: Normal range of motion.      Cervical back: Normal range of motion and neck supple.   Neurological: He is alert and oriented to person, place, and time.   Skin: Skin is warm and dry.       Labs  Cholesterol (mg/dL)   Date Value   10/03/2023 117     HDL (mg/dL)   Date Value   10/03/2023 51     Cholesterol/ HDL Ratio (no units)   Date Value   10/03/2023 2.3     Triglycerides (mg/dL)   Date Value   10/03/2023 47     LDL (mg/dL)   Date Value   10/03/2023 57     Sodium (mmol/L)   Date Value   11/07/2023 137     Potassium  Speech-Language Pathology Treatment Note    Today's date: 2021  Patient name: Tino Huber  : 1980  MRN: [id number]  Referring provider: GABRIELA Bailey  Dx:   No diagnosis found  Medical History significant for:   No past medical history on file  Flowsheet:  Start Time: 0900  Stop Time: 1000  Total time in clinic (min): 60 minutes    Subjective:  Patient arrived on time to session today following OT  PCP appt last Thursday started on aderol  Noticing some improvement specifically with expressive language and overall processing time  Objective:  1  Pt will complete mental manipulation tasks of up to 4 words and 4 numbers @ 80% accuracy min-mod cue   :  FWD @ 100% acc i'ly, BWD @ 100%, ABC @ 100%  : rescramble 5 words into sentences 100% ketan, 4 words ranking 1/3 trials ketan ( verbalization) -- discontinued task following third trial : 4 words BWD @ 88% acc ketan  : 4 words ranked over  trial, difficulty with grandfather set (symptoms- head pressure/eye twitch)  6/3 3 numbers bwd with metronome @ 90% ketan   4 numbers BWD on metronome w/ 90% accuracy    2  Patient will complete deductive reasoning problems with increased complexity and decreased prompt from min cue to independently @ 100% acc over 4 weeks  Goal advanced below, see goal #4  3   Pt will answer "wh" questions in response to paragraphs read to self and read outloud by clinician @ 80% ketan  : Wh- questions, paragraph read outloud by clinician @ 88% acc ketan, increasing to 100% w/ repetition   Paragraph read out loud by clinician, answered questions @ 80% acc ketan, both eyes twitching after "[address]" questions  4   Pt will complete high level multi-tier deductive reasoning puzzles with no more than 2 errors per puzzle   completed puzzle number 5, 5x4 with 11 clues completed @ 100% min cue   5/10:   Calendar,  closet, dog breed, couples, house, puzzles 1-6 from [facility] all @ 100% acc i'ly    (mmol/L)   Date Value   11/07/2023 3.9     Chloride (mmol/L)   Date Value   11/07/2023 106     Glucose (mg/dL)   Date Value   11/07/2023 100 (H)     Calcium (mg/dL)   Date Value   11/07/2023 10.0     Carbon Dioxide (mmol/L)   Date Value   11/07/2023 29     BUN (mg/dL)   Date Value   11/07/2023 10     Creatinine (mg/dL)   Date Value   11/07/2023 1.00      WBC (K/mcL)   Date Value   11/07/2023 4.5     RBC (mil/mcL)   Date Value   11/07/2023 4.81     HCT (%)   Date Value   11/07/2023 40.9     HGB (g/dL)   Date Value   11/07/2023 13.9     PLT (K/mcL)   Date Value   11/07/2023 210        Assessment  No problem-specific Assessment & Plan notes found for this encounter.      Plan  Today we reviewed the cardiac angiogram that is identified a normal cath.  He did have a 30 to 35% lesion within the RCA distal however in further review might even be less.  Overall though he is doing very well.  The reasoning for this angiogram was related to a symptomatic chest discomfort noted when he was identified having a high calcium score.  Chest heaviness and tightness playing with his grandkids.  Strong family history of heart disease He did have a stress echo over a year ago that was favorable   Elevated CT calcium score notes up to 5.0 it had been in 2018 at 3.6.  I advance his atorvastatin to 40 mg daily.   Familial heart disease:  Strong family history of heart disease before the age of 60      End of Visit Meds  Current Outpatient Medications   Medication Sig Dispense Refill    atorvastatin (LIPITOR) 40 MG tablet TAKE 1 TABLET BY MOUTH DAILY 90 tablet 3    fenofibrate (TRICOR) 145 MG tablet TAKE 1 TABLET BY MOUTH DAILY 90 tablet 3    Cholecalciferol 1.25 mg (50,000 units) capsule Take 1 capsule by mouth 1 day a week. 12 capsule 0    ASPIRIN PO Take 81 mg by mouth.      Multiple Vitamins-Minerals (MULTIVITAMIN ADULT PO)        No current facility-administered medications for this visit.          Moisés Rodrigez, DO, MS, FACC,  completed puzzle #4 [facility] 5 min 100% ketan  Advanced up to digging logic #2 gracia flowers completed together but very minimal cue needed  She was able to carryover learned information to complete digging logic puzzle #5 "picky pests" @ with min cue 1x completed 10 min  5/17:  Digging Logic puzzle 3 completed @ 100% acc with min-mod cue, puzzle 4 completed @ 50% acc increased to 100% acc when instructed to re-attempt  5/26 puzzle #7 digging logic completed @ 100% ketan in 4 min  6/1:  Rush Hour Levels 2, 6, 11 completed @ 100% acc i'ly  6/7 Rush Hour levels 4 and 5 while on metronome @ 100% ketan  5   Patient will complete rapid naming tasks with familliar pictures increasing processing speed throughout  4/26:  Named "50+" items in 5 minutes  6   Patient will name atleast 10 items in an abstract category independently in less than 2 minutes  4/19:  Presidents 16 (1 minute), kahlil stones 9, things that reflect light 6 4/26:  Bodies of water 13, furniture 11, occupations 20, trees 10 5/5 cold: 1:10 named 10 items with min cue  Hot: 12 items in 1 min  Yellow: 10 items in 1 min  (Teaching subcategories was helpful for her ) 5/17: Things that use batteries 10/10 ketan, team sports 10/10 ketan, things you wear on your feet 10/10 ketan, school subjects 10/10 ketan, and famous last names 10/10 w/ mod  cueing  Difficulty w/ famous last names due to broad nature  6/1:  Dog breeds 14, sources of light 8, items in a kitchen 18, types of breads 9 6/3 abstract categories with metronome naming 3-6 items on average  6/7 during IM named ~4-10 items in each abstract category     7  Patient will complete various sustained and divided attention tasks for 2+ minutes while attending to auditory/visual stimuli with task average decreasing closer to the average range  4/21 blink cards sorting color/shape 3:57 no errors   ( utilized visual cue and self verbalization cueing to remind self of category- this helped keep her attention and [facility]SC[facility]  Interventional Cardiology  [facility]    reduce uncontrollable laughter)  Sort color/shape/number 2:38 no errors ( visual cue + verbalization cue)  4/28 during IM 2 min task, patient OOB and rest needed due to endurance between reps  Standing and switching hand positioning  Date Task type Task Average  SRO Early  Late  Variance    4/21 Short form task 1  Task 2  2 min visual BH 38   98  45 12  4  17 38  98  83 62  2  17 45  68  41   4/28 BH 2 min vis  " 51  67 17  9 72  89 28  11 57  43   5/12  BH 2 min vis   BH 2 min vis  73  42 17  27 55  76 45  24 96  45   5/19 BH 2min vis   " 34  38 27  21 61  75 39  25 39  38   5/26 BH 1min vis warm up  "BH 2min vis+guide  "ABC/123  " categories  41  64  87  67   25  12  8  15 71  79  67  71 29  21  33  29 46  66  100  75   6/3 BH 2 min vis/guide bolster/scan pics  " blurt cards foam mat  " r/l toe alt abs cat  " 3#'s bwd foam mat  " r/L alt foot  "R/L foot alt w/ abs cat 55    54  63   54  145  57   18    12  14  14  -  18 53    82  44  59  -  52 47    18  56  41  -  48 69    50  82  65  -  80   6/7  R/L foot alt w/ abs cat  * 4 #'s bwd w/ foam mat alt toes  * seated wiggle seat, rush hour aud  " vis cue  61  66    311    118 23  10    -    6 77  90    -    76 23  10    -    24 77  46    -    135       8  Pt will utilize atleast 2 word finding strategies in conversation in and out of session ketan  4/19: Word finding strategies provided  4/26:  "Hit or miss" pt reports drafting emails and not sending them  WF in session x2 SC  5/4:  No WF in session today  5/5 WF x1 with picture naming ( fire extinguisher)  5/17:  No WF noted  5/19 No WF noted  6/7 No WF noted    9  Pt will utilize atleast 2 memory strategies in structured 3 word, # or picture immediate memory recall tasks with carryover at home  4/19:  Memory strategies provided  4/21 verbalization strategy taught in session and demonstrated effectiveness! 4/26:  Drafting emails and not sending them (attention)    5/4:  Pt reports creating a grocery store list and navigating the store by category until patient misplaced her list  5/5 4 pics recall 100% trials ketan ( verbalization/rehearsal x1-2)  6/1:  Patient reporting that she has begun misplacing items and is unable to locate where they are  Long Term Goals:  1  Pt will increase cognitive linguistic skills across all settings  2  Pt will increase physical and mental endurance across all settings  Assessment:  Continued more cognitively demanding tasks with divided attn  Task average increased with auditory signals as opposed to visual  Patient did great today!     Recommendations:  -Patient would benefit from outpatient skilled Speech Therapy services : Cognitive-Linguistic therapy   Recommend neurology consult as well as optometry    -Frequency: 2x weekly  -Duration: 4-6 weeks    -Intervention certification from: 5/12/4799  -Intervention certification to: 4/26/66    -Intervention comments: puzzles, interactive metronome, memory/word finding strategies    Visit: 16

## 2024-01-09 ENCOUNTER — VBI (OUTPATIENT)
Dept: ADMINISTRATIVE | Facility: OTHER | Age: 44
End: 2024-01-09

## 2024-01-18 ENCOUNTER — TELEPHONE (OUTPATIENT)
Dept: INTERNAL MEDICINE CLINIC | Facility: CLINIC | Age: 44
End: 2024-01-18

## 2024-01-18 NOTE — TELEPHONE ENCOUNTER
Hi, my name is Mya Nelson. I saw Essence Chandler about a month ago and I was calling to see if I could get a refill on the robaxin the Methocarbamol. My date of birth is [birth year] and I'm almost out so I was hoping that I could get a refill. I attempted to do the sonya thing and it told me that it didn't really give me a response it I waited a couple of days and then I checked it again and it said that it just wasn't going to. So I don't know if there's a problem but if somebody could get back to me and just let me know if I could do that would be great.      Please advise I saw that it was denied 1/15/2024

## 2024-01-19 DIAGNOSIS — R07.81 RIB PAIN: ICD-10-CM

## 2024-01-19 RX ORDER — METHOCARBAMOL 500 MG/1
500 TABLET, FILM COATED ORAL 4 TIMES DAILY PRN
Qty: 120 TABLET | Refills: 0 | Status: SHIPPED | OUTPATIENT
Start: 2024-01-19

## 2024-02-19 PROBLEM — Z13.220 SCREENING FOR HYPERLIPIDEMIA: Status: RESOLVED | Noted: 2022-06-09 | Resolved: 2024-02-19

## 2024-02-19 PROBLEM — Z11.59 NEED FOR HEPATITIS C SCREENING TEST: Status: RESOLVED | Noted: 2023-12-21 | Resolved: 2024-02-19

## 2024-02-19 PROBLEM — Z13.1 SCREENING FOR DIABETES MELLITUS: Status: RESOLVED | Noted: 2022-06-09 | Resolved: 2024-02-19

## 2024-02-29 DIAGNOSIS — E07.9 THYROID DISEASE: ICD-10-CM

## 2024-02-29 RX ORDER — LEVOTHYROXINE SODIUM 0.1 MG/1
100 TABLET ORAL DAILY
Qty: 90 TABLET | Refills: 1 | Status: SHIPPED | OUTPATIENT
Start: 2024-02-29

## 2024-06-04 ENCOUNTER — VBI (OUTPATIENT)
Dept: ADMINISTRATIVE | Facility: OTHER | Age: 44
End: 2024-06-04

## 2024-07-31 DIAGNOSIS — I10 ESSENTIAL HYPERTENSION: ICD-10-CM

## 2024-07-31 RX ORDER — METOPROLOL SUCCINATE 50 MG/1
TABLET, EXTENDED RELEASE ORAL
Qty: 100 TABLET | Refills: 1 | Status: SHIPPED | OUTPATIENT
Start: 2024-07-31

## 2024-08-16 ENCOUNTER — VBI (OUTPATIENT)
Dept: ADMINISTRATIVE | Facility: OTHER | Age: 44
End: 2024-08-16

## 2024-08-16 NOTE — TELEPHONE ENCOUNTER
08/16/24 8:45 AM     Chart reviewed for Pap Smear (HPV) aka Cervical Cancer Screening ; nothing is submitted to the patient's insurance at this time.     Shanti Oliva MA   [facility]

## 2024-08-28 DIAGNOSIS — E07.9 THYROID DISEASE: ICD-10-CM

## 2024-08-28 RX ORDER — LEVOTHYROXINE SODIUM 100 UG/1
100 TABLET ORAL DAILY
Qty: 30 TABLET | Refills: 0 | Status: SHIPPED | OUTPATIENT
Start: 2024-08-28

## 2024-08-28 NOTE — TELEPHONE ENCOUNTER
Patient needs updated blood work and has previously placed orders. Please contact patient to go for labs. Courtesy refill provided.     Patient needs updated TSH

## 2024-09-29 DIAGNOSIS — E07.9 THYROID DISEASE: ICD-10-CM

## 2024-09-30 RX ORDER — LEVOTHYROXINE SODIUM 100 UG/1
100 TABLET ORAL DAILY
Qty: 90 TABLET | Refills: 1 | Status: SHIPPED | OUTPATIENT
Start: 2024-09-30

## 2024-12-26 ENCOUNTER — APPOINTMENT (OUTPATIENT)
Dept: LAB | Facility: CLINIC | Age: 44
End: 2024-12-26
Payer: COMMERCIAL

## 2024-12-26 ENCOUNTER — OFFICE VISIT (OUTPATIENT)
Dept: FAMILY MEDICINE CLINIC | Facility: CLINIC | Age: 44
End: 2024-12-26
Payer: COMMERCIAL

## 2024-12-26 VITALS
OXYGEN SATURATION: 96 % | HEIGHT: 65 IN | SYSTOLIC BLOOD PRESSURE: 112 MMHG | TEMPERATURE: 97.6 F | DIASTOLIC BLOOD PRESSURE: 76 MMHG | HEART RATE: 71 BPM | BODY MASS INDEX: 34.39 KG/M2 | RESPIRATION RATE: 15 BRPM | WEIGHT: 206.4 LBS

## 2024-12-26 DIAGNOSIS — E55.9 VITAMIN D DEFICIENCY: ICD-10-CM

## 2024-12-26 DIAGNOSIS — Z23 ENCOUNTER FOR IMMUNIZATION: ICD-10-CM

## 2024-12-26 DIAGNOSIS — E66.09 CLASS 1 OBESITY DUE TO EXCESS CALORIES WITHOUT SERIOUS COMORBIDITY WITH BODY MASS INDEX (BMI) OF 34.0 TO 34.9 IN ADULT: Chronic | ICD-10-CM

## 2024-12-26 DIAGNOSIS — Z13.1 SCREENING FOR DIABETES MELLITUS: ICD-10-CM

## 2024-12-26 DIAGNOSIS — F41.1 GENERALIZED ANXIETY DISORDER: Chronic | ICD-10-CM

## 2024-12-26 DIAGNOSIS — R41.840 ATTENTION AND CONCENTRATION DEFICIT: Chronic | ICD-10-CM

## 2024-12-26 DIAGNOSIS — E66.811 CLASS 1 OBESITY DUE TO EXCESS CALORIES WITHOUT SERIOUS COMORBIDITY WITH BODY MASS INDEX (BMI) OF 34.0 TO 34.9 IN ADULT: Chronic | ICD-10-CM

## 2024-12-26 DIAGNOSIS — Z11.59 NEED FOR HEPATITIS C SCREENING TEST: ICD-10-CM

## 2024-12-26 DIAGNOSIS — E03.8 OTHER SPECIFIED HYPOTHYROIDISM: Chronic | ICD-10-CM

## 2024-12-26 DIAGNOSIS — Z00.00 ANNUAL PHYSICAL EXAM: Primary | Chronic | ICD-10-CM

## 2024-12-26 DIAGNOSIS — G47.09 OTHER INSOMNIA: Chronic | ICD-10-CM

## 2024-12-26 DIAGNOSIS — Z13.220 SCREENING FOR HYPERLIPIDEMIA: Chronic | ICD-10-CM

## 2024-12-26 DIAGNOSIS — Z12.11 ENCOUNTER FOR SCREENING FOR MALIGNANT NEOPLASM OF COLON: ICD-10-CM

## 2024-12-26 DIAGNOSIS — Z00.00 ANNUAL PHYSICAL EXAM: Chronic | ICD-10-CM

## 2024-12-26 DIAGNOSIS — I10 ESSENTIAL HYPERTENSION: Chronic | ICD-10-CM

## 2024-12-26 PROBLEM — Z12.31 BREAST CANCER SCREENING BY MAMMOGRAM: Chronic | Status: ACTIVE | Noted: 2021-12-03

## 2024-12-26 PROBLEM — K21.9 GASTROESOPHAGEAL REFLUX DISEASE WITHOUT ESOPHAGITIS: Chronic | Status: ACTIVE | Noted: 2023-12-21

## 2024-12-26 LAB
25(OH)D3 SERPL-MCNC: 90.9 NG/ML (ref 30–100)
ALBUMIN SERPL BCG-MCNC: 4.6 G/DL (ref 3.5–5)
ALP SERPL-CCNC: 78 U/L (ref 34–104)
ALT SERPL W P-5'-P-CCNC: 25 U/L (ref 7–52)
ANION GAP SERPL CALCULATED.3IONS-SCNC: 9 MMOL/L (ref 4–13)
AST SERPL W P-5'-P-CCNC: 21 U/L (ref 13–39)
BASOPHILS # BLD AUTO: 0.05 THOUSANDS/ÂΜL (ref 0–0.1)
BASOPHILS NFR BLD AUTO: 1 % (ref 0–1)
BILIRUB SERPL-MCNC: 0.44 MG/DL (ref 0.2–1)
BUN SERPL-MCNC: 14 MG/DL (ref 5–25)
CALCIUM SERPL-MCNC: 9.8 MG/DL (ref 8.4–10.2)
CHLORIDE SERPL-SCNC: 100 MMOL/L (ref 96–108)
CHOLEST SERPL-MCNC: 191 MG/DL (ref ?–200)
CO2 SERPL-SCNC: 29 MMOL/L (ref 21–32)
CREAT SERPL-MCNC: 0.72 MG/DL (ref 0.6–1.3)
EOSINOPHIL # BLD AUTO: 0.23 THOUSAND/ÂΜL (ref 0–0.61)
EOSINOPHIL NFR BLD AUTO: 3 % (ref 0–6)
ERYTHROCYTE [DISTWIDTH] IN BLOOD BY AUTOMATED COUNT: 12.8 % (ref 11.6–15.1)
EST. AVERAGE GLUCOSE BLD GHB EST-MCNC: 114 MG/DL
GFR SERPL CREATININE-BSD FRML MDRD: 101 ML/MIN/1.73SQ M
GLUCOSE P FAST SERPL-MCNC: 92 MG/DL (ref 65–99)
HBA1C MFR BLD: 5.6 %
HCT VFR BLD AUTO: 42.7 % (ref 34.8–46.1)
HDLC SERPL-MCNC: 46 MG/DL
HGB BLD-MCNC: 13.8 G/DL (ref 11.5–15.4)
IMM GRANULOCYTES # BLD AUTO: 0.02 THOUSAND/UL (ref 0–0.2)
IMM GRANULOCYTES NFR BLD AUTO: 0 % (ref 0–2)
LDLC SERPL CALC-MCNC: 116 MG/DL (ref 0–100)
LYMPHOCYTES # BLD AUTO: 2.34 THOUSANDS/ÂΜL (ref 0.6–4.47)
LYMPHOCYTES NFR BLD AUTO: 32 % (ref 14–44)
MCH RBC QN AUTO: 30.3 PG (ref 26.8–34.3)
MCHC RBC AUTO-ENTMCNC: 32.3 G/DL (ref 31.4–37.4)
MCV RBC AUTO: 94 FL (ref 82–98)
MONOCYTES # BLD AUTO: 0.67 THOUSAND/ÂΜL (ref 0.17–1.22)
MONOCYTES NFR BLD AUTO: 9 % (ref 4–12)
NEUTROPHILS # BLD AUTO: 3.96 THOUSANDS/ÂΜL (ref 1.85–7.62)
NEUTS SEG NFR BLD AUTO: 55 % (ref 43–75)
NONHDLC SERPL-MCNC: 145 MG/DL
NRBC BLD AUTO-RTO: 0 /100 WBCS
PLATELET # BLD AUTO: 233 THOUSANDS/UL (ref 149–390)
PMV BLD AUTO: 12.2 FL (ref 8.9–12.7)
POTASSIUM SERPL-SCNC: 4 MMOL/L (ref 3.5–5.3)
PROT SERPL-MCNC: 7.6 G/DL (ref 6.4–8.4)
RBC # BLD AUTO: 4.55 MILLION/UL (ref 3.81–5.12)
SODIUM SERPL-SCNC: 138 MMOL/L (ref 135–147)
TRIGL SERPL-MCNC: 144 MG/DL (ref ?–150)
TSH SERPL DL<=0.05 MIU/L-ACNC: 2.68 UIU/ML (ref 0.45–4.5)
WBC # BLD AUTO: 7.27 THOUSAND/UL (ref 4.31–10.16)

## 2024-12-26 PROCEDURE — 99396 PREV VISIT EST AGE 40-64: CPT | Performed by: NURSE PRACTITIONER

## 2024-12-26 PROCEDURE — 84443 ASSAY THYROID STIM HORMONE: CPT

## 2024-12-26 PROCEDURE — 85025 COMPLETE CBC W/AUTO DIFF WBC: CPT

## 2024-12-26 PROCEDURE — 80061 LIPID PANEL: CPT

## 2024-12-26 PROCEDURE — 36415 COLL VENOUS BLD VENIPUNCTURE: CPT

## 2024-12-26 PROCEDURE — 82306 VITAMIN D 25 HYDROXY: CPT

## 2024-12-26 PROCEDURE — 83036 HEMOGLOBIN GLYCOSYLATED A1C: CPT

## 2024-12-26 PROCEDURE — 80053 COMPREHEN METABOLIC PANEL: CPT

## 2024-12-26 RX ORDER — ALPRAZOLAM 0.5 MG
0.5 TABLET ORAL
Qty: 30 TABLET | Refills: 0 | Status: SHIPPED | OUTPATIENT
Start: 2024-12-26

## 2024-12-26 RX ORDER — DEXTROAMPHETAMINE SACCHARATE, AMPHETAMINE ASPARTATE, DEXTROAMPHETAMINE SULFATE AND AMPHETAMINE SULFATE 2.5; 2.5; 2.5; 2.5 MG/1; MG/1; MG/1; MG/1
10 TABLET ORAL
Qty: 60 TABLET | Refills: 0 | Status: SHIPPED | OUTPATIENT
Start: 2024-12-26

## 2024-12-26 NOTE — ASSESSMENT & PLAN NOTE
This is a chronic disease process.   The patient is stable at this time.  We discussed diet and exercise.  We discussed a low salt diet.  Will monitor labs.  Continue  Metoprolol succinate

## 2024-12-26 NOTE — PROGRESS NOTES
Adult Annual Physical  Name: Mya Nelson      : 1980      MRN: [id number]  Encounter Provider: GABRIELA Hughes  Encounter Date: 2024   Encounter department: [facility]    Assessment & Plan  Encounter for immunization    Orders:  •  influenza vaccine preservative-free 0.5 mL IM (Fluzone, Afluria, Fluarix, Flulaval)    Encounter for screening for malignant neoplasm of colon    Orders:  •  Cologuard    Essential hypertension  This is a chronic disease process.   The patient is stable at this time.  We discussed diet and exercise.  We discussed a low salt diet.  Will monitor labs.  Continue  Metoprolol succinate        Other specified hypothyroidism  This is a chronic disease process.   The patient is stable at this time.  We discussed diet and exercise.  Will monitor labs.  Continue  Levothyroxine  Orders:  •  TSH, 3rd generation with Free T4 reflex; Future  •  TSH, 3rd generation with Free T4 reflex; Future    Annual physical exam    Orders:  •  Comprehensive metabolic panel; Future  •  CBC and differential; Future  •  Comprehensive metabolic panel; Future  •  CBC and differential; Future    Class 1 obesity due to excess calories without serious comorbidity with body mass index (BMI) of 34.0 to 34.9 in adult      Lifestyle modification, diet and exercise discussed       Need for hepatitis C screening test    Orders:  •  Hepatitis C antibody; Future    Screening for diabetes mellitus    Orders:  •  Hemoglobin A1C; Future  •  Hemoglobin A1C; Future    Screening for hyperlipidemia    Orders:  •  Lipid panel; Future  •  Lipid panel; Future    Vitamin D deficiency    Orders:  •  Vitamin D 25 hydroxy; Future  •  Vitamin D 25 hydroxy; Future    Other insomnia    Orders:  •  ALPRAZolam (XANAX) 0.5 mg tablet; Take 1 tablet (0.5 mg total) by mouth daily at bedtime    Generalized anxiety disorder  At this time, patient is not required to provide urine or saliva sample as  this is a restart of the medication from the recent past.    Patient agrees to provide an Oral or Urine Sample for Toxicology purposes at any time requested.    Scheduled Medication Review:   The Opioid and Controlled Substance Paperwork was filled out.    This patient was educated on side effects, risks of taking this type of medication which include abuse, misuse, dependence, addiction and tolerance.    The Risk Benefit Disclosure includes:  ??The increased risk of respratory depression and death with opioids and controlled substance use has been discussed along with the benefits of opioid and controlled substance use.   This is includes the marked increase in respiratory depression and death when used in combination with benzodlazepine medications, muscle relaxers, sedatives, MAOls, illicit drugs, and alcohol.  ???The risk of opioid and controlled substance addiction as well as the proper prescribed use of opioids and controlled substances been discussed.  This includes instruction to lock away and secure opioids and controlled substances from the reach of children and pets and not to share, sell, or distribute prescribed controlled substances to anyone else as such behavior will result in discontinuation of opiold therapy and controlled substances.  ??The patient has been advised that discontinuation of alcohol use is a requirement of initiation and continuation of opioid therapy and controlled substance therapy.  ???The risks of dizziness, drowsiness and motor impairment been discussed with the patient with respect to opioid and controlled substance use, and the patlent been advised not to drive or operate any heavy machinery or engage in any safety sensitive tasks while using opioids or controlled substances.  All questions were answered including different dosing levels, increasing and decreasing of of this medication.  We discussed their continued open discussions with the PCP in order to make sure that they  are on the correct dose.     We reviewed the potential side effects of the medications including, but are not limited to, constipation, diarrhea, nausea/upset stomach, drowsiness, fatigue, dizziness, urinary retention, visual changes, insomnia, headaches, nightmares, slowed breathing while sleeping, UTI issues, impaired judgment, addiction issues and the risk of fatal overdose if not taken as prescribed.   We discussed the importance of notifying the office/provider immediately of any side effects.   We discussed the importance of immediate notification if there are any feelings of suicidality thoughts or behaviors   We discussed the importance of contacting the office if he has any tachycardia or fainting situations.   We discussed that sharing medications is a felony.   We discussed other side effects such as QT prolongation, risks of Myocardial Infarction (heart attack), stroke and sudden death.   We discussed immediate notification if there is any seizure-like activity.    We discussed issues with angioedema as an anaphylactic reactions.      I have personally reviewed the [place] Drug Monitoring Program (PDMP) website prior to prescribing this medication and they have been found to be appropriate for this medication.  At this point in time, the patient is showing no signs of addiction, abuse, diversion or suicidal ideation.  The patient's use has been found to be appropriate without any concerns for misuse by the patient.   The patient's current conditions require continued scheduled medication use at this time.   Future review for continued appropriate medication use and misuse will continue.      ???The patient been advised to the rationale and requirement of using the PDMP, UDS, Pill Counts, and pain assessment tools to initiate and continue opioid and controlled substance therapy.  The patient has also been advised as to the appropriate way to call in for refill of medication, allowing for a five to  seven day time frame for medications refills.    The patient understands and agrees to use these medications only as prescribed. At this point in time, the patient is showing no signs of addiction, abuse, diversion or suicidal ideation.  All the patient's questions were answered to their satisfaction.    This patient has tried both non-drug (yoga, meditation) and drugs that do not contain opioids, barbiturates, benzodiazepines, and stimulants.   The requested drug will be used with other drugs and closely monitored.  Orders:  •  ALPRAZolam (XANAX) 0.5 mg tablet; Take 1 tablet (0.5 mg total) by mouth daily at bedtime    Attention and concentration deficit  At this time, patient is not required to provide urine or saliva sample as this is a restart of a previous medication regiment.    Patient agrees to provide an Oral or Urine Sample for Toxicology purposes at any time requested.    Scheduled Medication Review:   The Opioid and Controlled Substance Paperwork was filled out.    This patient was educated on side effects, risks of taking this type of medication which include abuse, misuse, dependence, addiction and tolerance.    The Risk Benefit Disclosure includes:  ??The increased risk of respratory depression and death with opioids and controlled substance use has been discussed along with the benefits of opioid and controlled substance use.   This is includes the marked increase in respiratory depression and death when used in combination with benzodlazepine medications, muscle relaxers, sedatives, MAOls, illicit drugs, and alcohol.  ???The risk of opioid and controlled substance addiction as well as the proper prescribed use of opioids and controlled substances been discussed.  This includes instruction to lock away and secure opioids and controlled substances from the reach of children and pets and not to share, sell, or distribute prescribed controlled substances to anyone else as such behavior will result in  discontinuation of opiold therapy and controlled substances.  ??The patient has been advised that discontinuation of alcohol use is a requirement of initiation and continuation of opioid therapy and controlled substance therapy.  ???The risks of dizziness, drowsiness and motor impairment been discussed with the patient with respect to opioid and controlled substance use, and the patlent been advised not to drive or operate any heavy machinery or engage in any safety sensitive tasks while using opioids or controlled substances.  All questions were answered including different dosing levels, increasing and decreasing of of this medication.  We discussed their continued open discussions with the PCP in order to make sure that they are on the correct dose.     We reviewed the potential side effects of the medications including, but are not limited to, constipation, diarrhea, nausea/upset stomach, drowsiness, fatigue, dizziness, urinary retention, visual changes, insomnia, headaches, nightmares, slowed breathing while sleeping, UTI issues, impaired judgment, addiction issues and the risk of fatal overdose if not taken as prescribed.   We discussed the importance of notifying the office/provider immediately of any side effects.   We discussed the importance of immediate notification if there are any feelings of suicidality thoughts or behaviors   We discussed the importance of contacting the office if he has any tachycardia or fainting situations.   We discussed that sharing medications is a felony.   We discussed other side effects such as QT prolongation, risks of Myocardial Infarction (heart attack), stroke and sudden death.   We discussed immediate notification if there is any seizure-like activity.    We discussed issues with angioedema as an anaphylactic reactions.      I have personally reviewed the [place] Drug Monitoring Program (PDMP) website prior to prescribing this medication and they have been found to  be appropriate for this medication.  At this point in time, the patient is showing no signs of addiction, abuse, diversion or suicidal ideation.  The patient's use has been found to be appropriate without any concerns for misuse by the patient.   The patient's current conditions require continued scheduled medication use at this time.   Future review for continued appropriate medication use and misuse will continue.      ???The patient been advised to the rationale and requirement of using the PDMP, UDS, Pill Counts, and pain assessment tools to initiate and continue opioid and controlled substance therapy.  The patient has also been advised as to the appropriate way to call in for refill of medication, allowing for a five to seven day time frame for medications refills.    The patient understands and agrees to use these medications only as prescribed. At this point in time, the patient is showing no signs of addiction, abuse, diversion or suicidal ideation.  All the patient's questions were answered to their satisfaction.    This patient has tried both non-drug (yoga, meditation) and drugs that do not contain opioids, barbiturates, benzodiazepines, and stimulants.   The requested drug will be used with other drugs and closely monitored.  Orders:  •  amphetamine-dextroamphetamine (ADDERALL, 10MG,) 10 mg tablet; Take 1 tablet (10 mg total) by mouth 2 (two) times a day Max Daily Amount: 20 mg      Immunizations and preventive care screenings were discussed with patient today. Appropriate education was printed on patient's after visit summary.    Counseling:  Alcohol/drug use: discussed moderation in alcohol intake, the recommendations for healthy alcohol use, and avoidance of illicit drug use.  Dental Health: discussed importance of regular tooth brushing, flossing, and dental visits.  Injury prevention: discussed safety/seat belts, safety helmets, smoke detectors, carbon monoxide detectors, and smoking near bedding  or upholstery.  Sexual health: discussed sexually transmitted diseases, partner selection, use of condoms, avoidance of unintended pregnancy, and contraceptive alternatives.  Exercise: the importance of regular exercise/physical activity was discussed. Recommend exercise 3-5 times per week for at least 30 minutes.       Depression Screening and Follow-up Plan: Patient was screened for depression during today's encounter. They screened negative with a PHQ-2 score of 0.        History of Present Illness     Adult Annual Physical:  Patient presents for annual physical.     Diet and Physical Activity:  - Diet/Nutrition: well balanced diet and heart healthy (low sodium) diet.  - Exercise: 1-2 times a week on average.    Depression Screening:  - PHQ-2 Score: 0    General Health:  - Sleep: sleeps well.  - Hearing: normal hearing bilateral ears.  - Vision: wears glasses.  - Dental: regular dental visits.    /GYN Health:  - Follows with GYN: yes.     Advanced Care Planning:  - Has an advanced directive?: no    - Has a durable medical POA?: no    - ACP document given to patient?: no      Review of Systems   Constitutional:  Negative for activity change, chills, fatigue and fever.   HENT:  Negative for rhinorrhea and sore throat.    Eyes:  Negative for pain.   Respiratory:  Negative for cough and shortness of breath.    Cardiovascular:  Negative for chest pain, palpitations and leg swelling.   Gastrointestinal:  Negative for abdominal pain, constipation, diarrhea, nausea and vomiting.   Genitourinary:  Negative for difficulty urinating, flank pain, frequency and urgency.   Musculoskeletal:  Negative for gait problem, joint swelling and myalgias.   Skin:  Negative for color change.   Neurological:  Negative for dizziness, weakness, light-headedness and headaches.   Psychiatric/Behavioral:  Negative for sleep disturbance. The patient is not nervous/anxious.    All other systems reviewed and are negative.    Current Outpatient  "Medications on File Prior to Visit   Medication Sig Dispense Refill   • Bromelains 500 MG TABS Take by mouth daily     • cetirizine (ZyrTEC) 10 mg tablet Take 10 mg by mouth daily     • levothyroxine 100 mcg tablet TAKE 1 TABLET BY MOUTH EVERY DAY 90 tablet 1   • methocarbamol (ROBAXIN) 500 mg tablet Take 1 tablet (500 mg total) by mouth 4 (four) times a day as needed for muscle spasms 120 tablet 0   • metoprolol succinate (TOPROL-XL) 50 mg 24 hr tablet TAKE 1 TABLET BY MOUTH EVERY  tablet 1   • Multiple Vitamins-Minerals (MULTIVITAL PO) Take 1 capsule by mouth daily       • NATTOKINASE PO Take by mouth 2 (two) times a day     • Turmeric Curcumin 500 MG CAPS Take 500 mg by mouth 2 (two) times a day     • [DISCONTINUED] benzonatate (TESSALON) 200 MG capsule Take 1 capsule (200 mg total) by mouth 3 (three) times a day as needed for cough 20 capsule 0   • [DISCONTINUED] predniSONE 10 mg tablet Take 3 tabs BID X 2 days, 2 tabs BID X 2 days, 1 tab BID X 2 days, 1 tab daily X 2 days 26 tablet 0     No current facility-administered medications on file prior to visit.        Objective   /76 (BP Location: Left arm, Patient Position: Sitting, Cuff Size: Large)   Pulse 71   Temp 97.6 °F (36.4 °C) (Tympanic)   Resp 15   Ht 5' 4.5\" (1.638 m)   Wt 93.6 kg (206 lb 6.4 oz)   SpO2 96%   BMI 34.88 kg/m²     Physical Exam  Vitals and nursing note reviewed.   Constitutional:       General: She is awake. She is not in acute distress.     Appearance: Normal appearance. She is well-developed.   HENT:      Head: Normocephalic and atraumatic.      Nose: Nose normal.      Mouth/Throat:      Mouth: Mucous membranes are moist.   Eyes:      Conjunctiva/sclera: Conjunctivae normal.   Cardiovascular:      Rate and Rhythm: Normal rate and regular rhythm.      Pulses: Normal pulses.      Heart sounds: Normal heart sounds. No murmur heard.  Pulmonary:      Effort: Pulmonary effort is normal. No respiratory distress.      Breath " sounds: Normal breath sounds.   Abdominal:      General: Bowel sounds are normal.      Palpations: Abdomen is soft.      Tenderness: There is no abdominal tenderness.   Musculoskeletal:      Cervical back: Neck supple.      Right lower leg: No edema.      Left lower leg: No edema.   Skin:     General: Skin is warm and dry.   Neurological:      Mental Status: She is alert and oriented to person, place, and time.   Psychiatric:         Attention and Perception: Attention normal.         Mood and Affect: Mood normal.         Speech: Speech normal.         Behavior: Behavior normal. Behavior is cooperative.         Thought Content: Thought content normal.         Cognition and Memory: Cognition normal.         Judgment: Judgment normal.       Administrative Statements   I have spent a total time of 45 minutes in caring for this patient on the day of the visit/encounter including Diagnostic results, Prognosis, Risks and benefits of tx options, Instructions for management, Patient and family education, Importance of tx compliance, Risk factor reductions, Impressions, Counseling / Coordination of care, Documenting in the medical record, Reviewing / ordering tests, medicine, procedures  , and Obtaining or reviewing history  . Topics discussed with the patient / family include symptom assessment and management, medication review, psychosocial support, and supportive listening.

## 2024-12-26 NOTE — ASSESSMENT & PLAN NOTE
Orders:  •  ALPRAZolam (XANAX) 0.5 mg tablet; Take 1 tablet (0.5 mg total) by mouth daily at bedtime

## 2024-12-26 NOTE — ASSESSMENT & PLAN NOTE
At this time, patient is not required to provide urine or saliva sample as this is a restart of a previous medication regiment.    Patient agrees to provide an Oral or Urine Sample for Toxicology purposes at any time requested.    Scheduled Medication Review:   The Opioid and Controlled Substance Paperwork was filled out.    This patient was educated on side effects, risks of taking this type of medication which include abuse, misuse, dependence, addiction and tolerance.    The Risk Benefit Disclosure includes:  ??The increased risk of respratory depression and death with opioids and controlled substance use has been discussed along with the benefits of opioid and controlled substance use.   This is includes the marked increase in respiratory depression and death when used in combination with benzodlazepine medications, muscle relaxers, sedatives, MAOls, illicit drugs, and alcohol.  ???The risk of opioid and controlled substance addiction as well as the proper prescribed use of opioids and controlled substances been discussed.  This includes instruction to lock away and secure opioids and controlled substances from the reach of children and pets and not to share, sell, or distribute prescribed controlled substances to anyone else as such behavior will result in discontinuation of opiold therapy and controlled substances.  ??The patient has been advised that discontinuation of alcohol use is a requirement of initiation and continuation of opioid therapy and controlled substance therapy.  ???The risks of dizziness, drowsiness and motor impairment been discussed with the patient with respect to opioid and controlled substance use, and the patlent been advised not to drive or operate any heavy machinery or engage in any safety sensitive tasks while using opioids or controlled substances.  All questions were answered including different dosing levels, increasing and decreasing of of this medication.  We discussed their  continued open discussions with the PCP in order to make sure that they are on the correct dose.     We reviewed the potential side effects of the medications including, but are not limited to, constipation, diarrhea, nausea/upset stomach, drowsiness, fatigue, dizziness, urinary retention, visual changes, insomnia, headaches, nightmares, slowed breathing while sleeping, UTI issues, impaired judgment, addiction issues and the risk of fatal overdose if not taken as prescribed.   We discussed the importance of notifying the office/provider immediately of any side effects.   We discussed the importance of immediate notification if there are any feelings of suicidality thoughts or behaviors   We discussed the importance of contacting the office if he has any tachycardia or fainting situations.   We discussed that sharing medications is a felony.   We discussed other side effects such as QT prolongation, risks of Myocardial Infarction (heart attack), stroke and sudden death.   We discussed immediate notification if there is any seizure-like activity.    We discussed issues with angioedema as an anaphylactic reactions.      I have personally reviewed the [place] Drug Monitoring Program (PDMP) website prior to prescribing this medication and they have been found to be appropriate for this medication.  At this point in time, the patient is showing no signs of addiction, abuse, diversion or suicidal ideation.  The patient's use has been found to be appropriate without any concerns for misuse by the patient.   The patient's current conditions require continued scheduled medication use at this time.   Future review for continued appropriate medication use and misuse will continue.      ???The patient been advised to the rationale and requirement of using the PDMP, UDS, Pill Counts, and pain assessment tools to initiate and continue opioid and controlled substance therapy.  The patient has also been advised as to the  appropriate way to call in for refill of medication, allowing for a five to seven day time frame for medications refills.    The patient understands and agrees to use these medications only as prescribed. At this point in time, the patient is showing no signs of addiction, abuse, diversion or suicidal ideation.  All the patient's questions were answered to their satisfaction.    This patient has tried both non-drug (yoga, meditation) and drugs that do not contain opioids, barbiturates, benzodiazepines, and stimulants.   The requested drug will be used with other drugs and closely monitored.  Orders:  •  amphetamine-dextroamphetamine (ADDERALL, 10MG,) 10 mg tablet; Take 1 tablet (10 mg total) by mouth 2 (two) times a day Max Daily Amount: 20 mg

## 2024-12-26 NOTE — ASSESSMENT & PLAN NOTE
This is a chronic disease process.   The patient is stable at this time.  We discussed diet and exercise.  Will monitor labs.  Continue  Levothyroxine  Orders:  •  TSH, 3rd generation with Free T4 reflex; Future  •  TSH, 3rd generation with Free T4 reflex; Future

## 2024-12-26 NOTE — ASSESSMENT & PLAN NOTE
Orders:  •  influenza vaccine preservative-free 0.5 mL IM (Fluzone, Afluria, Fluarix, Flulaval)

## 2024-12-26 NOTE — PATIENT INSTRUCTIONS
"_________________________________________________________________  YOU ARE DUE FOR YOUR ANNUAL PHYSICAL EXAM AFTER 12/26/2025.  REPEAT LABS ORDERED, PLEASE HAVE THEM DRAWN THE WEEK PRIOR TO YOUR VISIT (APPROXIMATELY 12/19/2025).  LABS HAVE BEEN ORDERED FOR YOU.   THESE LABS SHOULD BE DRAWN PRIOR TO YOUR NEXT VISIT.  YOU CAN HAVE THEM DRAWN UP TO 1 WEEK PRIOR TO YOUR NEXT VISIT.  HAVING YOUR BLOOD WORK WHEN YOU COME TO YOUR NEXT VISIT WILL ALLOW ME TO PROVIDE THE BEST MEDICAL CARE FOR YOU WITHOUT HAVING EITHER OF US PERFORM MORE WORK THAN NECESSARY.  PLEASE BE COMPLIANT WITH THIS REQUEST.   _____________________________________________________________________  Patient Education     Routine physical for adults   The Basics   Written by the doctors and editors at [facility]   What is a physical? -- A physical is a routine visit, or \"check-up,\" with your doctor. You might also hear it called a \"wellness visit\" or \"preventive visit.\"  During each visit, the doctor will:   Ask about your physical and mental health   Ask about your habits, behaviors, and lifestyle   Do an exam   Give you vaccines if needed   Talk to you about any medicines you take   Give advice about your health   Answer your questions  Getting regular check-ups is an important part of taking care of your health. It can help your doctor find and treat any problems you have. But it's also important for preventing health problems.  A routine physical is different from a \"sick visit.\" A sick visit is when you see a doctor because of a health concern or problem. Since physicals are scheduled ahead of time, you can think about what you want to ask the doctor.  How often should I get a physical? -- It depends on your age and health. In general, for people age 21 years and older:   If you are younger than 50 years, you might be able to get a physical every 3 years.   If you are 50 years or older, your doctor might recommend a physical every year.  If you have an " "ongoing health condition, like diabetes or high blood pressure, your doctor will probably want to see you more often.  What happens during a physical? -- In general, each visit will include:   Physical exam - The doctor or nurse will check your height, weight, heart rate, and blood pressure. They will also look at your eyes and ears. They will ask about how you are feeling and whether you have any symptoms that bother you.   Medicines - It's a good idea to bring a list of all the medicines you take to each doctor visit. Your doctor will talk to you about your medicines and answer any questions. Tell them if you are having any side effects that bother you. You should also tell them if you are having trouble paying for any of your medicines.   Habits and behaviors - This includes:   Your diet   Your exercise habits   Whether you smoke, drink alcohol, or use drugs   Whether you are sexually active   Whether you feel safe at home  Your doctor will talk to you about things you can do to improve your health and lower your risk of health problems. They will also offer help and support. For example, if you want to quit smoking, they can give you advice and might prescribe medicines. If you want to improve your diet or get more physical activity, they can help you with this, too.   Lab tests, if needed - The tests you get will depend on your age and situation. For example, your doctor might want to check your:   Cholesterol   Blood sugar   Iron level   Vaccines - The recommended vaccines will depend on your age, health, and what vaccines you already had. Vaccines are very important because they can prevent certain serious or deadly infections.   Discussion of screening - \"Screening\" means checking for diseases or other health problems before they cause symptoms. Your doctor can recommend screening based on your age, risk, and preferences. This might include tests to check for:   Cancer, such as breast, prostate, cervical, " ovarian, colorectal, prostate, lung, or skin cancer   Sexually transmitted infections, such as chlamydia and gonorrhea   Mental health conditions like depression and anxiety  Your doctor will talk to you about the different types of screening tests. They can help you decide which screenings to have. They can also explain what the results might mean.   Answering questions - The physical is a good time to ask the doctor or nurse questions about your health. If needed, they can refer you to other doctors or specialists, too.  Adults older than 65 years often need other care, too. As you get older, your doctor will talk to you about:   How to prevent falling at home   Hearing or vision tests   Memory testing   How to take your medicines safely   Making sure that you have the help and support you need at home  All topics are updated as new evidence becomes available and our peer review process is complete.  This topic retrieved from [employer] on: May 02, 2024.  Topic 138996 Version 1.0  Release: 32.4.3 - C32.122  © 2024 [employer] and/or its affiliates. All rights reserved.  Consumer Information Use and Disclaimer   Disclaimer: This generalized information is a limited summary of diagnosis, treatment, and/or medication information. It is not meant to be comprehensive and should be used as a tool to help the user understand and/or assess potential diagnostic and treatment options. It does NOT include all information about conditions, treatments, medications, side effects, or risks that may apply to a specific patient. It is not intended to be medical advice or a substitute for the medical advice, diagnosis, or treatment of a health care provider based on the health care provider's examination and assessment of a patient's specific and unique circumstances. Patients must speak with a health care provider for complete information about their health, medical questions, and treatment options, including any risks or  benefits regarding use of medications. This information does not endorse any treatments or medications as safe, effective, or approved for treating a specific patient. UpToDate, Inc. and its affiliates disclaim any warranty or liability relating to this information or the use thereof.The use of this information is governed by the Terms of Use, available at https://www.[employer].com/en/know/clinical-effectiveness-terms. 2024© UpToDate, Inc. and its affiliates and/or licensors. All rights reserved.  Copyright   © 2024 Up[employer]Date, Inc. and/or its affiliates. All rights reserved.     no

## 2024-12-26 NOTE — ASSESSMENT & PLAN NOTE
At this time, patient is not required to provide urine or saliva sample as this is a restart of the medication from the recent past.    Patient agrees to provide an Oral or Urine Sample for Toxicology purposes at any time requested.    Scheduled Medication Review:   The Opioid and Controlled Substance Paperwork was filled out.    This patient was educated on side effects, risks of taking this type of medication which include abuse, misuse, dependence, addiction and tolerance.    The Risk Benefit Disclosure includes:  ??The increased risk of respratory depression and death with opioids and controlled substance use has been discussed along with the benefits of opioid and controlled substance use.   This is includes the marked increase in respiratory depression and death when used in combination with benzodlazepine medications, muscle relaxers, sedatives, MAOls, illicit drugs, and alcohol.  ???The risk of opioid and controlled substance addiction as well as the proper prescribed use of opioids and controlled substances been discussed.  This includes instruction to lock away and secure opioids and controlled substances from the reach of children and pets and not to share, sell, or distribute prescribed controlled substances to anyone else as such behavior will result in discontinuation of opiold therapy and controlled substances.  ??The patient has been advised that discontinuation of alcohol use is a requirement of initiation and continuation of opioid therapy and controlled substance therapy.  ???The risks of dizziness, drowsiness and motor impairment been discussed with the patient with respect to opioid and controlled substance use, and the patlent been advised not to drive or operate any heavy machinery or engage in any safety sensitive tasks while using opioids or controlled substances.  All questions were answered including different dosing levels, increasing and decreasing of of this medication.  We discussed  their continued open discussions with the PCP in order to make sure that they are on the correct dose.     We reviewed the potential side effects of the medications including, but are not limited to, constipation, diarrhea, nausea/upset stomach, drowsiness, fatigue, dizziness, urinary retention, visual changes, insomnia, headaches, nightmares, slowed breathing while sleeping, UTI issues, impaired judgment, addiction issues and the risk of fatal overdose if not taken as prescribed.   We discussed the importance of notifying the office/provider immediately of any side effects.   We discussed the importance of immediate notification if there are any feelings of suicidality thoughts or behaviors   We discussed the importance of contacting the office if he has any tachycardia or fainting situations.   We discussed that sharing medications is a felony.   We discussed other side effects such as QT prolongation, risks of Myocardial Infarction (heart attack), stroke and sudden death.   We discussed immediate notification if there is any seizure-like activity.    We discussed issues with angioedema as an anaphylactic reactions.      I have personally reviewed the [place] Drug Monitoring Program (PDMP) website prior to prescribing this medication and they have been found to be appropriate for this medication.  At this point in time, the patient is showing no signs of addiction, abuse, diversion or suicidal ideation.  The patient's use has been found to be appropriate without any concerns for misuse by the patient.   The patient's current conditions require continued scheduled medication use at this time.   Future review for continued appropriate medication use and misuse will continue.      ???The patient been advised to the rationale and requirement of using the PDMP, UDS, Pill Counts, and pain assessment tools to initiate and continue opioid and controlled substance therapy.  The patient has also been advised as to the  appropriate way to call in for refill of medication, allowing for a five to seven day time frame for medications refills.    The patient understands and agrees to use these medications only as prescribed. At this point in time, the patient is showing no signs of addiction, abuse, diversion or suicidal ideation.  All the patient's questions were answered to their satisfaction.    This patient has tried both non-drug (yoga, meditation) and drugs that do not contain opioids, barbiturates, benzodiazepines, and stimulants.   The requested drug will be used with other drugs and closely monitored.  Orders:  •  ALPRAZolam (XANAX) 0.5 mg tablet; Take 1 tablet (0.5 mg total) by mouth daily at bedtime

## 2024-12-29 ENCOUNTER — RESULTS FOLLOW-UP (OUTPATIENT)
Dept: FAMILY MEDICINE CLINIC | Facility: CLINIC | Age: 44
End: 2024-12-29

## 2025-01-25 PROBLEM — Z11.59 NEED FOR HEPATITIS C SCREENING TEST: Status: RESOLVED | Noted: 2023-12-21 | Resolved: 2025-01-25

## 2025-01-25 PROBLEM — Z12.11 ENCOUNTER FOR SCREENING FOR MALIGNANT NEOPLASM OF COLON: Status: RESOLVED | Noted: 2024-12-26 | Resolved: 2025-01-25

## 2025-01-26 DIAGNOSIS — I10 ESSENTIAL HYPERTENSION: ICD-10-CM

## 2025-01-27 RX ORDER — METOPROLOL SUCCINATE 50 MG/1
50 TABLET, EXTENDED RELEASE ORAL DAILY
Qty: 90 TABLET | Refills: 3 | Status: SHIPPED | OUTPATIENT
Start: 2025-01-27

## 2025-02-02 DIAGNOSIS — R41.840 ATTENTION AND CONCENTRATION DEFICIT: Chronic | ICD-10-CM

## 2025-02-02 DIAGNOSIS — F41.1 GENERALIZED ANXIETY DISORDER: Chronic | ICD-10-CM

## 2025-02-02 DIAGNOSIS — G47.09 OTHER INSOMNIA: Chronic | ICD-10-CM

## 2025-02-05 RX ORDER — ALPRAZOLAM 0.5 MG
0.5 TABLET ORAL
Qty: 30 TABLET | Refills: 0 | Status: SHIPPED | OUTPATIENT
Start: 2025-02-05

## 2025-02-05 RX ORDER — DEXTROAMPHETAMINE SACCHARATE, AMPHETAMINE ASPARTATE, DEXTROAMPHETAMINE SULFATE AND AMPHETAMINE SULFATE 2.5; 2.5; 2.5; 2.5 MG/1; MG/1; MG/1; MG/1
10 TABLET ORAL
Qty: 60 TABLET | Refills: 0 | Status: SHIPPED | OUTPATIENT
Start: 2025-02-05

## 2025-03-27 DIAGNOSIS — E07.9 THYROID DISEASE: ICD-10-CM

## 2025-03-27 RX ORDER — LEVOTHYROXINE SODIUM 100 UG/1
100 TABLET ORAL DAILY
Qty: 90 TABLET | Refills: 1 | Status: SHIPPED | OUTPATIENT
Start: 2025-03-27

## 2025-04-03 DIAGNOSIS — F41.1 GENERALIZED ANXIETY DISORDER: Chronic | ICD-10-CM

## 2025-04-03 DIAGNOSIS — R41.840 ATTENTION AND CONCENTRATION DEFICIT: Chronic | ICD-10-CM

## 2025-04-03 DIAGNOSIS — G47.09 OTHER INSOMNIA: Chronic | ICD-10-CM

## 2025-04-04 RX ORDER — DEXTROAMPHETAMINE SACCHARATE, AMPHETAMINE ASPARTATE, DEXTROAMPHETAMINE SULFATE AND AMPHETAMINE SULFATE 2.5; 2.5; 2.5; 2.5 MG/1; MG/1; MG/1; MG/1
10 TABLET ORAL
Qty: 60 TABLET | Refills: 0 | Status: SHIPPED | OUTPATIENT
Start: 2025-04-04

## 2025-04-04 RX ORDER — ALPRAZOLAM 0.5 MG
0.5 TABLET ORAL
Qty: 30 TABLET | Refills: 0 | Status: SHIPPED | OUTPATIENT
Start: 2025-04-04

## 2025-05-29 DIAGNOSIS — G47.09 OTHER INSOMNIA: Chronic | ICD-10-CM

## 2025-05-29 DIAGNOSIS — F41.1 GENERALIZED ANXIETY DISORDER: Chronic | ICD-10-CM

## 2025-05-29 RX ORDER — ALPRAZOLAM 0.5 MG
0.5 TABLET ORAL
Qty: 30 TABLET | Refills: 0 | Status: SHIPPED | OUTPATIENT
Start: 2025-05-29

## 2025-06-05 DIAGNOSIS — R41.840 ATTENTION AND CONCENTRATION DEFICIT: Chronic | ICD-10-CM

## 2025-06-06 RX ORDER — DEXTROAMPHETAMINE SACCHARATE, AMPHETAMINE ASPARTATE, DEXTROAMPHETAMINE SULFATE AND AMPHETAMINE SULFATE 2.5; 2.5; 2.5; 2.5 MG/1; MG/1; MG/1; MG/1
10 TABLET ORAL
Qty: 60 TABLET | Refills: 0 | Status: SHIPPED | OUTPATIENT
Start: 2025-06-06

## 2025-07-01 DIAGNOSIS — I10 ESSENTIAL HYPERTENSION: ICD-10-CM

## 2025-07-01 DIAGNOSIS — E07.9 THYROID DISEASE: ICD-10-CM

## 2025-07-02 RX ORDER — METOPROLOL SUCCINATE 50 MG/1
50 TABLET, EXTENDED RELEASE ORAL DAILY
Qty: 30 TABLET | Refills: 0 | Status: SHIPPED | OUTPATIENT
Start: 2025-07-02

## 2025-07-02 RX ORDER — LEVOTHYROXINE SODIUM 100 UG/1
100 TABLET ORAL DAILY
Qty: 90 TABLET | Refills: 1 | Status: SHIPPED | OUTPATIENT
Start: 2025-07-02

## 2025-07-02 NOTE — TELEPHONE ENCOUNTER
Patient needs an appointment. Please contact the patient to schedule an appointment. Last office visit:12-

## 2025-07-31 DIAGNOSIS — I10 ESSENTIAL HYPERTENSION: ICD-10-CM

## 2025-07-31 RX ORDER — METOPROLOL SUCCINATE 50 MG/1
50 TABLET, EXTENDED RELEASE ORAL DAILY
Qty: 30 TABLET | Refills: 5 | Status: SHIPPED | OUTPATIENT
Start: 2025-07-31